# Patient Record
Sex: FEMALE | Race: WHITE | Employment: OTHER | URBAN - METROPOLITAN AREA
[De-identification: names, ages, dates, MRNs, and addresses within clinical notes are randomized per-mention and may not be internally consistent; named-entity substitution may affect disease eponyms.]

---

## 2017-02-10 RX ORDER — ATENOLOL 25 MG/1
25 TABLET ORAL DAILY
COMMUNITY
End: 2017-07-05 | Stop reason: SDUPTHER

## 2017-02-10 RX ORDER — OMEPRAZOLE 40 MG/1
40 CAPSULE, DELAYED RELEASE ORAL DAILY
COMMUNITY
End: 2017-02-14

## 2017-02-10 RX ORDER — SIMVASTATIN 40 MG
40 TABLET ORAL NIGHTLY
Status: ON HOLD | COMMUNITY
End: 2018-07-27 | Stop reason: HOSPADM

## 2017-02-10 RX ORDER — GABAPENTIN 600 MG/1
1200 TABLET ORAL 4 TIMES DAILY
COMMUNITY
End: 2018-03-05 | Stop reason: SDUPTHER

## 2017-02-10 RX ORDER — LEVOTHYROXINE SODIUM 0.1 MG/1
100 TABLET ORAL DAILY
COMMUNITY
End: 2017-07-05 | Stop reason: SDUPTHER

## 2017-02-10 RX ORDER — OXYCODONE HYDROCHLORIDE AND ACETAMINOPHEN 5; 325 MG/1; MG/1
1 TABLET ORAL EVERY 6 HOURS PRN
COMMUNITY
End: 2017-08-03

## 2017-03-07 PROBLEM — M16.12 PRIMARY OSTEOARTHRITIS OF LEFT HIP: Chronic | Status: ACTIVE | Noted: 2017-03-07

## 2017-03-08 PROBLEM — E03.9 HYPOTHYROID: Status: ACTIVE | Noted: 2017-03-08

## 2017-03-08 PROBLEM — R53.83 FATIGUE: Status: ACTIVE | Noted: 2017-03-08

## 2017-03-08 PROBLEM — I10 ESSENTIAL HYPERTENSION: Status: ACTIVE | Noted: 2017-03-08

## 2017-03-13 ENCOUNTER — TELEPHONE (OUTPATIENT)
Dept: ORTHOPEDIC SURGERY | Age: 72
End: 2017-03-13

## 2017-04-07 ENCOUNTER — OFFICE VISIT (OUTPATIENT)
Dept: FAMILY MEDICINE CLINIC | Age: 72
End: 2017-04-07
Payer: COMMERCIAL

## 2017-04-07 VITALS
SYSTOLIC BLOOD PRESSURE: 140 MMHG | HEART RATE: 65 BPM | BODY MASS INDEX: 25.78 KG/M2 | DIASTOLIC BLOOD PRESSURE: 82 MMHG | HEIGHT: 64 IN | WEIGHT: 151 LBS

## 2017-04-07 DIAGNOSIS — I10 ESSENTIAL HYPERTENSION: Primary | ICD-10-CM

## 2017-04-07 DIAGNOSIS — M15.9 PRIMARY OSTEOARTHRITIS INVOLVING MULTIPLE JOINTS: ICD-10-CM

## 2017-04-07 DIAGNOSIS — E03.9 ACQUIRED HYPOTHYROIDISM: ICD-10-CM

## 2017-04-07 DIAGNOSIS — E78.2 MIXED HYPERLIPIDEMIA: ICD-10-CM

## 2017-04-07 DIAGNOSIS — I25.10 CORONARY ARTERY DISEASE INVOLVING NATIVE CORONARY ARTERY OF NATIVE HEART WITHOUT ANGINA PECTORIS: ICD-10-CM

## 2017-04-07 DIAGNOSIS — M21.371 RIGHT FOOT DROP: ICD-10-CM

## 2017-04-07 PROCEDURE — 99204 OFFICE O/P NEW MOD 45 MIN: CPT | Performed by: FAMILY MEDICINE

## 2017-04-07 RX ORDER — HYDROCODONE BITARTRATE AND ACETAMINOPHEN 5; 325 MG/1; MG/1
1 TABLET ORAL EVERY 6 HOURS PRN
COMMUNITY
End: 2018-03-28 | Stop reason: SDUPTHER

## 2017-04-07 RX ORDER — ASPIRIN 325 MG
325 TABLET ORAL 2 TIMES DAILY
COMMUNITY
End: 2017-08-03

## 2017-04-07 ASSESSMENT — ENCOUNTER SYMPTOMS
SHORTNESS OF BREATH: 0
COUGH: 0
CONSTIPATION: 0
ABDOMINAL PAIN: 0
DIARRHEA: 0
EYES NEGATIVE: 1

## 2017-04-14 LAB
CHOLESTEROL, TOTAL: NORMAL MG/DL
CHOLESTEROL/HDL RATIO: NORMAL
HDLC SERPL-MCNC: NORMAL MG/DL (ref 35–70)
LDL CHOLESTEROL CALCULATED: NORMAL MG/DL (ref 0–160)
T4 FREE: NORMAL
TRIGL SERPL-MCNC: NORMAL MG/DL
TSH SERPL DL<=0.05 MIU/L-ACNC: NORMAL UIU/ML
VLDLC SERPL CALC-MCNC: NORMAL MG/DL

## 2017-04-17 DIAGNOSIS — E03.9 ACQUIRED HYPOTHYROIDISM: ICD-10-CM

## 2017-04-17 DIAGNOSIS — E78.2 MIXED HYPERLIPIDEMIA: ICD-10-CM

## 2017-07-05 RX ORDER — LEVOTHYROXINE SODIUM 0.1 MG/1
100 TABLET ORAL DAILY
Qty: 30 TABLET | Refills: 3 | Status: SHIPPED | OUTPATIENT
Start: 2017-07-05 | End: 2017-10-08 | Stop reason: SDUPTHER

## 2017-07-05 RX ORDER — ATENOLOL 25 MG/1
25 TABLET ORAL DAILY
Qty: 30 TABLET | Refills: 3 | Status: SHIPPED | OUTPATIENT
Start: 2017-07-05 | End: 2017-10-08 | Stop reason: SDUPTHER

## 2017-08-03 ENCOUNTER — HOSPITAL ENCOUNTER (INPATIENT)
Age: 72
LOS: 2 days | Discharge: HOME | DRG: 372 | End: 2017-08-05
Attending: EMERGENCY MEDICINE | Admitting: FAMILY MEDICINE
Payer: MEDICARE

## 2017-08-03 ENCOUNTER — TELEPHONE (OUTPATIENT)
Dept: FAMILY MEDICINE CLINIC | Age: 72
End: 2017-08-03

## 2017-08-03 DIAGNOSIS — K92.2 GASTROINTESTINAL HEMORRHAGE, UNSPECIFIED GASTROINTESTINAL HEMORRHAGE TYPE: Primary | ICD-10-CM

## 2017-08-03 PROBLEM — K92.0 COFFEE GROUND EMESIS: Status: ACTIVE | Noted: 2017-08-03

## 2017-08-03 PROBLEM — K62.5 BLEEDING PER RECTUM: Status: ACTIVE | Noted: 2017-08-03

## 2017-08-03 LAB
ABSOLUTE EOS #: 0.2 K/UL (ref 0–0.4)
ABSOLUTE LYMPH #: 2.4 K/UL (ref 1–4.8)
ABSOLUTE MONO #: 0.6 K/UL (ref 0.2–0.8)
ANION GAP SERPL CALCULATED.3IONS-SCNC: 15 MMOL/L (ref 9–17)
BASOPHILS # BLD: 1 %
BASOPHILS ABSOLUTE: 0.1 K/UL (ref 0–0.2)
BUN BLDV-MCNC: 16 MG/DL (ref 8–23)
BUN/CREAT BLD: 22 (ref 9–20)
CALCIUM SERPL-MCNC: 9.6 MG/DL (ref 8.6–10.4)
CHLORIDE BLD-SCNC: 99 MMOL/L (ref 98–107)
CO2: 24 MMOL/L (ref 20–31)
CREAT SERPL-MCNC: 0.74 MG/DL (ref 0.5–0.9)
DATE, STOOL #1: ABNORMAL
DATE, STOOL #2: ABNORMAL
DATE, STOOL #3: ABNORMAL
DIFFERENTIAL TYPE: ABNORMAL
EOSINOPHILS RELATIVE PERCENT: 2 %
GFR AFRICAN AMERICAN: >60 ML/MIN
GFR NON-AFRICAN AMERICAN: >60 ML/MIN
GFR SERPL CREATININE-BSD FRML MDRD: ABNORMAL ML/MIN/{1.73_M2}
GFR SERPL CREATININE-BSD FRML MDRD: ABNORMAL ML/MIN/{1.73_M2}
GLUCOSE BLD-MCNC: 118 MG/DL (ref 70–99)
HCT VFR BLD CALC: 37.6 % (ref 36–46)
HEMOCCULT SP1 STL QL: POSITIVE
HEMOCCULT SP2 STL QL: POSITIVE
HEMOCCULT SP3 STL QL: ABNORMAL
HEMOGLOBIN: 12.8 G/DL (ref 12–16)
LYMPHOCYTES # BLD: 18 %
MCH RBC QN AUTO: 31.5 PG (ref 26–34)
MCHC RBC AUTO-ENTMCNC: 34.1 G/DL (ref 31–37)
MCV RBC AUTO: 92.6 FL (ref 80–100)
MONOCYTES # BLD: 5 %
PDW BLD-RTO: 13.7 % (ref 11.5–14.5)
PLATELET # BLD: 308 K/UL (ref 130–400)
PLATELET ESTIMATE: ABNORMAL
PMV BLD AUTO: 7.5 FL (ref 6–12)
POTASSIUM SERPL-SCNC: 4 MMOL/L (ref 3.7–5.3)
RBC # BLD: 4.06 M/UL (ref 4–5.2)
RBC # BLD: ABNORMAL 10*6/UL
SEG NEUTROPHILS: 74 %
SEGMENTED NEUTROPHILS ABSOLUTE COUNT: 9.8 K/UL (ref 1.8–7.7)
SODIUM BLD-SCNC: 138 MMOL/L (ref 135–144)
TIME, STOOL #1: 1330
TIME, STOOL #2: ABNORMAL
TIME, STOOL #3: ABNORMAL
WBC # BLD: 13.2 K/UL (ref 3.5–11)
WBC # BLD: ABNORMAL 10*3/UL

## 2017-08-03 PROCEDURE — 80048 BASIC METABOLIC PNL TOTAL CA: CPT

## 2017-08-03 PROCEDURE — 2580000003 HC RX 258: Performed by: NURSE PRACTITIONER

## 2017-08-03 PROCEDURE — S0028 INJECTION, FAMOTIDINE, 20 MG: HCPCS | Performed by: NURSE PRACTITIONER

## 2017-08-03 PROCEDURE — G0328 FECAL BLOOD SCRN IMMUNOASSAY: HCPCS

## 2017-08-03 PROCEDURE — 2500000003 HC RX 250 WO HCPCS: Performed by: NURSE PRACTITIONER

## 2017-08-03 PROCEDURE — 86850 RBC ANTIBODY SCREEN: CPT

## 2017-08-03 PROCEDURE — 86870 RBC ANTIBODY IDENTIFICATION: CPT

## 2017-08-03 PROCEDURE — 99222 1ST HOSP IP/OBS MODERATE 55: CPT | Performed by: INTERNAL MEDICINE

## 2017-08-03 PROCEDURE — 86900 BLOOD TYPING SEROLOGIC ABO: CPT

## 2017-08-03 PROCEDURE — 86901 BLOOD TYPING SEROLOGIC RH(D): CPT

## 2017-08-03 PROCEDURE — 99222 1ST HOSP IP/OBS MODERATE 55: CPT | Performed by: FAMILY MEDICINE

## 2017-08-03 PROCEDURE — 6370000000 HC RX 637 (ALT 250 FOR IP): Performed by: INTERNAL MEDICINE

## 2017-08-03 PROCEDURE — 85018 HEMOGLOBIN: CPT

## 2017-08-03 PROCEDURE — 36415 COLL VENOUS BLD VENIPUNCTURE: CPT

## 2017-08-03 PROCEDURE — 2580000003 HC RX 258: Performed by: FAMILY MEDICINE

## 2017-08-03 PROCEDURE — 6370000000 HC RX 637 (ALT 250 FOR IP): Performed by: FAMILY MEDICINE

## 2017-08-03 PROCEDURE — 85014 HEMATOCRIT: CPT

## 2017-08-03 PROCEDURE — 1200000000 HC SEMI PRIVATE

## 2017-08-03 PROCEDURE — 6360000002 HC RX W HCPCS: Performed by: NURSE PRACTITIONER

## 2017-08-03 PROCEDURE — 99284 EMERGENCY DEPT VISIT MOD MDM: CPT

## 2017-08-03 PROCEDURE — C9113 INJ PANTOPRAZOLE SODIUM, VIA: HCPCS | Performed by: NURSE PRACTITIONER

## 2017-08-03 PROCEDURE — 85025 COMPLETE CBC W/AUTO DIFF WBC: CPT

## 2017-08-03 RX ORDER — TRIAMCINOLONE ACETONIDE 0.25 MG/G
1 CREAM TOPICAL 2 TIMES DAILY
COMMUNITY

## 2017-08-03 RX ORDER — POTASSIUM CHLORIDE 20 MEQ/1
40 TABLET, EXTENDED RELEASE ORAL PRN
Status: DISCONTINUED | OUTPATIENT
Start: 2017-08-03 | End: 2017-08-05 | Stop reason: HOSPADM

## 2017-08-03 RX ORDER — POLYETHYLENE GLYCOL 3350 17 G/17G
255 POWDER, FOR SOLUTION ORAL ONCE
Status: COMPLETED | OUTPATIENT
Start: 2017-08-03 | End: 2017-08-03

## 2017-08-03 RX ORDER — LEVOTHYROXINE SODIUM 0.1 MG/1
100 TABLET ORAL DAILY
Status: DISCONTINUED | OUTPATIENT
Start: 2017-08-03 | End: 2017-08-03

## 2017-08-03 RX ORDER — MAGNESIUM SULFATE 1 G/100ML
1 INJECTION INTRAVENOUS PRN
Status: DISCONTINUED | OUTPATIENT
Start: 2017-08-03 | End: 2017-08-05 | Stop reason: HOSPADM

## 2017-08-03 RX ORDER — SODIUM CHLORIDE 9 MG/ML
INJECTION, SOLUTION INTRAVENOUS CONTINUOUS
Status: DISCONTINUED | OUTPATIENT
Start: 2017-08-03 | End: 2017-08-03

## 2017-08-03 RX ORDER — ONDANSETRON 2 MG/ML
4 INJECTION INTRAMUSCULAR; INTRAVENOUS EVERY 6 HOURS PRN
Status: DISCONTINUED | OUTPATIENT
Start: 2017-08-03 | End: 2017-08-05 | Stop reason: HOSPADM

## 2017-08-03 RX ORDER — DOCUSATE SODIUM 100 MG/1
100 CAPSULE, LIQUID FILLED ORAL 2 TIMES DAILY PRN
Status: DISCONTINUED | OUTPATIENT
Start: 2017-08-03 | End: 2017-08-05 | Stop reason: HOSPADM

## 2017-08-03 RX ORDER — ATENOLOL 25 MG/1
25 TABLET ORAL DAILY
Status: DISCONTINUED | OUTPATIENT
Start: 2017-08-03 | End: 2017-08-04

## 2017-08-03 RX ORDER — SODIUM CHLORIDE 0.9 % (FLUSH) 0.9 %
10 SYRINGE (ML) INJECTION EVERY 12 HOURS SCHEDULED
Status: DISCONTINUED | OUTPATIENT
Start: 2017-08-03 | End: 2017-08-05 | Stop reason: HOSPADM

## 2017-08-03 RX ORDER — POTASSIUM CHLORIDE 7.45 MG/ML
10 INJECTION INTRAVENOUS PRN
Status: DISCONTINUED | OUTPATIENT
Start: 2017-08-03 | End: 2017-08-05 | Stop reason: HOSPADM

## 2017-08-03 RX ORDER — SIMVASTATIN 40 MG
40 TABLET ORAL NIGHTLY
Status: DISCONTINUED | OUTPATIENT
Start: 2017-08-03 | End: 2017-08-05 | Stop reason: HOSPADM

## 2017-08-03 RX ORDER — 0.9 % SODIUM CHLORIDE 0.9 %
500 INTRAVENOUS SOLUTION INTRAVENOUS ONCE
Status: COMPLETED | OUTPATIENT
Start: 2017-08-03 | End: 2017-08-03

## 2017-08-03 RX ORDER — LEVOTHYROXINE SODIUM 0.1 MG/1
100 TABLET ORAL NIGHTLY
Status: DISCONTINUED | OUTPATIENT
Start: 2017-08-03 | End: 2017-08-05 | Stop reason: HOSPADM

## 2017-08-03 RX ORDER — 0.9 % SODIUM CHLORIDE 0.9 %
10 VIAL (ML) INJECTION 2 TIMES DAILY
Status: DISCONTINUED | OUTPATIENT
Start: 2017-08-03 | End: 2017-08-03

## 2017-08-03 RX ORDER — GABAPENTIN 400 MG/1
800 CAPSULE ORAL 3 TIMES DAILY
Status: DISCONTINUED | OUTPATIENT
Start: 2017-08-03 | End: 2017-08-05 | Stop reason: HOSPADM

## 2017-08-03 RX ORDER — POTASSIUM CHLORIDE 20MEQ/15ML
40 LIQUID (ML) ORAL PRN
Status: DISCONTINUED | OUTPATIENT
Start: 2017-08-03 | End: 2017-08-05 | Stop reason: HOSPADM

## 2017-08-03 RX ORDER — SODIUM CHLORIDE 9 MG/ML
INJECTION, SOLUTION INTRAVENOUS CONTINUOUS
Status: DISCONTINUED | OUTPATIENT
Start: 2017-08-03 | End: 2017-08-05

## 2017-08-03 RX ORDER — PANTOPRAZOLE SODIUM 40 MG/10ML
40 INJECTION, POWDER, LYOPHILIZED, FOR SOLUTION INTRAVENOUS 2 TIMES DAILY
Status: DISCONTINUED | OUTPATIENT
Start: 2017-08-03 | End: 2017-08-03

## 2017-08-03 RX ORDER — SODIUM CHLORIDE 0.9 % (FLUSH) 0.9 %
10 SYRINGE (ML) INJECTION PRN
Status: DISCONTINUED | OUTPATIENT
Start: 2017-08-03 | End: 2017-08-05 | Stop reason: HOSPADM

## 2017-08-03 RX ORDER — ACETAMINOPHEN 325 MG/1
650 TABLET ORAL EVERY 4 HOURS PRN
Status: DISCONTINUED | OUTPATIENT
Start: 2017-08-03 | End: 2017-08-05 | Stop reason: HOSPADM

## 2017-08-03 RX ORDER — LEVOTHYROXINE SODIUM 0.1 MG/1
100 TABLET ORAL NIGHTLY
Status: DISCONTINUED | OUTPATIENT
Start: 2017-08-04 | End: 2017-08-03

## 2017-08-03 RX ORDER — MORPHINE SULFATE 2 MG/ML
1 INJECTION, SOLUTION INTRAMUSCULAR; INTRAVENOUS
Status: DISCONTINUED | OUTPATIENT
Start: 2017-08-03 | End: 2017-08-05 | Stop reason: HOSPADM

## 2017-08-03 RX ORDER — GABAPENTIN 600 MG/1
600 TABLET ORAL 4 TIMES DAILY
Status: CANCELLED | OUTPATIENT
Start: 2017-08-03

## 2017-08-03 RX ADMIN — LEVOTHYROXINE SODIUM 100 MCG: 100 TABLET ORAL at 21:39

## 2017-08-03 RX ADMIN — FAMOTIDINE 20 MG: 10 INJECTION, SOLUTION INTRAVENOUS at 14:55

## 2017-08-03 RX ADMIN — SODIUM CHLORIDE: 9 INJECTION, SOLUTION INTRAVENOUS at 18:44

## 2017-08-03 RX ADMIN — GABAPENTIN 800 MG: 400 CAPSULE ORAL at 21:38

## 2017-08-03 RX ADMIN — BISACODYL 20 MG: 5 TABLET, COATED ORAL at 18:34

## 2017-08-03 RX ADMIN — SODIUM CHLORIDE 500 ML: 9 INJECTION, SOLUTION INTRAVENOUS at 14:55

## 2017-08-03 RX ADMIN — POLYETHYLENE GLYCOL 3350 255 G: 17 POWDER, FOR SOLUTION ORAL at 18:34

## 2017-08-03 RX ADMIN — SODIUM CHLORIDE 80 MG: 9 INJECTION, SOLUTION INTRAVENOUS at 14:55

## 2017-08-03 RX ADMIN — SIMVASTATIN 40 MG: 40 TABLET, FILM COATED ORAL at 21:39

## 2017-08-03 RX ADMIN — SODIUM CHLORIDE 8 MG/HR: 9 INJECTION, SOLUTION INTRAVENOUS at 15:32

## 2017-08-03 ASSESSMENT — ENCOUNTER SYMPTOMS
VOMITING: 0
CONSTIPATION: 0
SINUS PRESSURE: 0
VOICE CHANGE: 0
COUGH: 0
SORE THROAT: 0
NAUSEA: 0
RHINORRHEA: 0
DIARRHEA: 0
COLOR CHANGE: 0
ABDOMINAL PAIN: 0
SHORTNESS OF BREATH: 0
BLOOD IN STOOL: 1
WHEEZING: 0

## 2017-08-03 ASSESSMENT — PAIN DESCRIPTION - LOCATION: LOCATION: ABDOMEN

## 2017-08-03 ASSESSMENT — PAIN DESCRIPTION - DESCRIPTORS: DESCRIPTORS: DULL

## 2017-08-03 ASSESSMENT — PAIN DESCRIPTION - PAIN TYPE: TYPE: ACUTE PAIN

## 2017-08-03 ASSESSMENT — PAIN SCALES - GENERAL: PAINLEVEL_OUTOF10: 2

## 2017-08-03 ASSESSMENT — PAIN DESCRIPTION - FREQUENCY: FREQUENCY: CONTINUOUS

## 2017-08-04 ENCOUNTER — ANESTHESIA (OUTPATIENT)
Dept: OPERATING ROOM | Age: 72
DRG: 372 | End: 2017-08-04
Payer: MEDICARE

## 2017-08-04 ENCOUNTER — ANESTHESIA EVENT (OUTPATIENT)
Dept: OPERATING ROOM | Age: 72
DRG: 372 | End: 2017-08-04
Payer: MEDICARE

## 2017-08-04 VITALS — OXYGEN SATURATION: 95 % | DIASTOLIC BLOOD PRESSURE: 69 MMHG | SYSTOLIC BLOOD PRESSURE: 150 MMHG

## 2017-08-04 PROBLEM — I47.29 NSVT (NONSUSTAINED VENTRICULAR TACHYCARDIA) (HCC): Status: ACTIVE | Noted: 2017-08-04

## 2017-08-04 LAB
ANION GAP SERPL CALCULATED.3IONS-SCNC: 14 MMOL/L (ref 9–17)
ANION GAP SERPL CALCULATED.3IONS-SCNC: 15 MMOL/L (ref 9–17)
BUN BLDV-MCNC: 13 MG/DL (ref 8–23)
BUN BLDV-MCNC: 13 MG/DL (ref 8–23)
BUN/CREAT BLD: 16 (ref 9–20)
BUN/CREAT BLD: 18 (ref 9–20)
C DIFFICILE TOXINS, PCR: NORMAL
CALCIUM SERPL-MCNC: 9.1 MG/DL (ref 8.6–10.4)
CALCIUM SERPL-MCNC: 9.2 MG/DL (ref 8.6–10.4)
CHLORIDE BLD-SCNC: 103 MMOL/L (ref 98–107)
CHLORIDE BLD-SCNC: 103 MMOL/L (ref 98–107)
CO2: 23 MMOL/L (ref 20–31)
CO2: 25 MMOL/L (ref 20–31)
CREAT SERPL-MCNC: 0.74 MG/DL (ref 0.5–0.9)
CREAT SERPL-MCNC: 0.81 MG/DL (ref 0.5–0.9)
GFR AFRICAN AMERICAN: >60 ML/MIN
GFR AFRICAN AMERICAN: >60 ML/MIN
GFR NON-AFRICAN AMERICAN: >60 ML/MIN
GFR NON-AFRICAN AMERICAN: >60 ML/MIN
GFR SERPL CREATININE-BSD FRML MDRD: ABNORMAL ML/MIN/{1.73_M2}
GLUCOSE BLD-MCNC: 100 MG/DL (ref 70–99)
GLUCOSE BLD-MCNC: 119 MG/DL (ref 70–99)
HCT VFR BLD CALC: 36.2 % (ref 36–46)
HCT VFR BLD CALC: 36.5 % (ref 36–46)
HCT VFR BLD CALC: 38.3 % (ref 36–46)
HEMOGLOBIN: 12.2 G/DL (ref 12–16)
HEMOGLOBIN: 12.8 G/DL (ref 12–16)
HEMOGLOBIN: 12.8 G/DL (ref 12–16)
MAGNESIUM: 1.9 MG/DL (ref 1.6–2.6)
MCH RBC QN AUTO: 31.6 PG (ref 26–34)
MCHC RBC AUTO-ENTMCNC: 33.7 G/DL (ref 31–37)
MCV RBC AUTO: 93.9 FL (ref 80–100)
PDW BLD-RTO: 14.1 % (ref 11.5–14.5)
PLATELET # BLD: 327 K/UL (ref 130–400)
PMV BLD AUTO: 8 FL (ref 6–12)
POTASSIUM SERPL-SCNC: 3.8 MMOL/L (ref 3.7–5.3)
POTASSIUM SERPL-SCNC: 3.9 MMOL/L (ref 3.7–5.3)
RBC # BLD: 3.85 M/UL (ref 4–5.2)
SODIUM BLD-SCNC: 141 MMOL/L (ref 135–144)
SODIUM BLD-SCNC: 142 MMOL/L (ref 135–144)
SPECIMEN DESCRIPTION: NORMAL
WBC # BLD: 14.5 K/UL (ref 3.5–11)

## 2017-08-04 PROCEDURE — 3609010300 HC COLONOSCOPY W/BIOPSY SINGLE/MULTIPLE: Performed by: INTERNAL MEDICINE

## 2017-08-04 PROCEDURE — 6370000000 HC RX 637 (ALT 250 FOR IP): Performed by: FAMILY MEDICINE

## 2017-08-04 PROCEDURE — 0DB78ZX EXCISION OF STOMACH, PYLORUS, VIA NATURAL OR ARTIFICIAL OPENING ENDOSCOPIC, DIAGNOSTIC: ICD-10-PCS | Performed by: INTERNAL MEDICINE

## 2017-08-04 PROCEDURE — 3700000000 HC ANESTHESIA ATTENDED CARE: Performed by: INTERNAL MEDICINE

## 2017-08-04 PROCEDURE — 2500000003 HC RX 250 WO HCPCS: Performed by: NURSE ANESTHETIST, CERTIFIED REGISTERED

## 2017-08-04 PROCEDURE — 2060000000 HC ICU INTERMEDIATE R&B

## 2017-08-04 PROCEDURE — 85018 HEMOGLOBIN: CPT

## 2017-08-04 PROCEDURE — C9113 INJ PANTOPRAZOLE SODIUM, VIA: HCPCS | Performed by: INTERNAL MEDICINE

## 2017-08-04 PROCEDURE — 1200000000 HC SEMI PRIVATE

## 2017-08-04 PROCEDURE — 99232 SBSQ HOSP IP/OBS MODERATE 35: CPT | Performed by: INTERNAL MEDICINE

## 2017-08-04 PROCEDURE — 0DBM8ZX EXCISION OF DESCENDING COLON, VIA NATURAL OR ARTIFICIAL OPENING ENDOSCOPIC, DIAGNOSTIC: ICD-10-PCS | Performed by: INTERNAL MEDICINE

## 2017-08-04 PROCEDURE — 80048 BASIC METABOLIC PNL TOTAL CA: CPT

## 2017-08-04 PROCEDURE — 2580000003 HC RX 258: Performed by: FAMILY MEDICINE

## 2017-08-04 PROCEDURE — 7100000010 HC PHASE II RECOVERY - FIRST 15 MIN: Performed by: INTERNAL MEDICINE

## 2017-08-04 PROCEDURE — 88305 TISSUE EXAM BY PATHOLOGIST: CPT

## 2017-08-04 PROCEDURE — 83735 ASSAY OF MAGNESIUM: CPT

## 2017-08-04 PROCEDURE — 2580000003 HC RX 258: Performed by: INTERNAL MEDICINE

## 2017-08-04 PROCEDURE — G8979 MOBILITY GOAL STATUS: HCPCS

## 2017-08-04 PROCEDURE — 3609012400 HC EGD TRANSORAL BIOPSY SINGLE/MULTIPLE: Performed by: INTERNAL MEDICINE

## 2017-08-04 PROCEDURE — 85027 COMPLETE CBC AUTOMATED: CPT

## 2017-08-04 PROCEDURE — 97530 THERAPEUTIC ACTIVITIES: CPT

## 2017-08-04 PROCEDURE — 36415 COLL VENOUS BLD VENIPUNCTURE: CPT

## 2017-08-04 PROCEDURE — 6360000002 HC RX W HCPCS: Performed by: INTERNAL MEDICINE

## 2017-08-04 PROCEDURE — 85014 HEMATOCRIT: CPT

## 2017-08-04 PROCEDURE — 7100000011 HC PHASE II RECOVERY - ADDTL 15 MIN: Performed by: INTERNAL MEDICINE

## 2017-08-04 PROCEDURE — 6370000000 HC RX 637 (ALT 250 FOR IP): Performed by: NURSE PRACTITIONER

## 2017-08-04 PROCEDURE — G8978 MOBILITY CURRENT STATUS: HCPCS

## 2017-08-04 PROCEDURE — 6360000002 HC RX W HCPCS: Performed by: NURSE ANESTHETIST, CERTIFIED REGISTERED

## 2017-08-04 PROCEDURE — 6370000000 HC RX 637 (ALT 250 FOR IP): Performed by: INTERNAL MEDICINE

## 2017-08-04 PROCEDURE — 0DBN8ZX EXCISION OF SIGMOID COLON, VIA NATURAL OR ARTIFICIAL OPENING ENDOSCOPIC, DIAGNOSTIC: ICD-10-PCS | Performed by: INTERNAL MEDICINE

## 2017-08-04 PROCEDURE — 2580000003 HC RX 258: Performed by: NURSE ANESTHETIST, CERTIFIED REGISTERED

## 2017-08-04 PROCEDURE — 45380 COLONOSCOPY AND BIOPSY: CPT | Performed by: INTERNAL MEDICINE

## 2017-08-04 PROCEDURE — 3700000001 HC ADD 15 MINUTES (ANESTHESIA): Performed by: INTERNAL MEDICINE

## 2017-08-04 PROCEDURE — 97161 PT EVAL LOW COMPLEX 20 MIN: CPT

## 2017-08-04 PROCEDURE — G8980 MOBILITY D/C STATUS: HCPCS

## 2017-08-04 PROCEDURE — 0DB98ZX EXCISION OF DUODENUM, VIA NATURAL OR ARTIFICIAL OPENING ENDOSCOPIC, DIAGNOSTIC: ICD-10-PCS | Performed by: INTERNAL MEDICINE

## 2017-08-04 PROCEDURE — 43239 EGD BIOPSY SINGLE/MULTIPLE: CPT | Performed by: INTERNAL MEDICINE

## 2017-08-04 PROCEDURE — 87493 C DIFF AMPLIFIED PROBE: CPT

## 2017-08-04 RX ORDER — LIDOCAINE HYDROCHLORIDE 20 MG/ML
INJECTION, SOLUTION EPIDURAL; INFILTRATION; INTRACAUDAL; PERINEURAL PRN
Status: DISCONTINUED | OUTPATIENT
Start: 2017-08-04 | End: 2017-08-04 | Stop reason: SDUPTHER

## 2017-08-04 RX ORDER — ATENOLOL 25 MG/1
25 TABLET ORAL DAILY
Status: DISCONTINUED | OUTPATIENT
Start: 2017-08-04 | End: 2017-08-05 | Stop reason: HOSPADM

## 2017-08-04 RX ORDER — LABETALOL HYDROCHLORIDE 5 MG/ML
INJECTION, SOLUTION INTRAVENOUS PRN
Status: DISCONTINUED | OUTPATIENT
Start: 2017-08-04 | End: 2017-08-04 | Stop reason: SDUPTHER

## 2017-08-04 RX ORDER — SODIUM CHLORIDE, SODIUM LACTATE, POTASSIUM CHLORIDE, CALCIUM CHLORIDE 600; 310; 30; 20 MG/100ML; MG/100ML; MG/100ML; MG/100ML
INJECTION, SOLUTION INTRAVENOUS CONTINUOUS PRN
Status: DISCONTINUED | OUTPATIENT
Start: 2017-08-04 | End: 2017-08-04 | Stop reason: SDUPTHER

## 2017-08-04 RX ORDER — PROPOFOL 10 MG/ML
INJECTION, EMULSION INTRAVENOUS PRN
Status: DISCONTINUED | OUTPATIENT
Start: 2017-08-04 | End: 2017-08-04 | Stop reason: SDUPTHER

## 2017-08-04 RX ORDER — METRONIDAZOLE 500 MG/1
500 TABLET ORAL EVERY 8 HOURS SCHEDULED
Status: DISCONTINUED | OUTPATIENT
Start: 2017-08-05 | End: 2017-08-05 | Stop reason: HOSPADM

## 2017-08-04 RX ORDER — FENTANYL CITRATE 50 UG/ML
INJECTION, SOLUTION INTRAMUSCULAR; INTRAVENOUS PRN
Status: DISCONTINUED | OUTPATIENT
Start: 2017-08-04 | End: 2017-08-04 | Stop reason: SDUPTHER

## 2017-08-04 RX ADMIN — PROPOFOL 20 MG: 10 INJECTION, EMULSION INTRAVENOUS at 15:00

## 2017-08-04 RX ADMIN — LABETALOL HYDROCHLORIDE 10 MG: 5 INJECTION, SOLUTION INTRAVENOUS at 14:56

## 2017-08-04 RX ADMIN — LIDOCAINE HYDROCHLORIDE 20 MG: 20 INJECTION, SOLUTION EPIDURAL; INFILTRATION; INTRACAUDAL; PERINEURAL at 14:41

## 2017-08-04 RX ADMIN — SODIUM CHLORIDE, POTASSIUM CHLORIDE, SODIUM LACTATE AND CALCIUM CHLORIDE: 600; 310; 30; 20 INJECTION, SOLUTION INTRAVENOUS at 15:05

## 2017-08-04 RX ADMIN — PROPOFOL 20 MG: 10 INJECTION, EMULSION INTRAVENOUS at 14:49

## 2017-08-04 RX ADMIN — SIMVASTATIN 40 MG: 40 TABLET, FILM COATED ORAL at 20:55

## 2017-08-04 RX ADMIN — LIDOCAINE HYDROCHLORIDE 20 MG: 20 INJECTION, SOLUTION EPIDURAL; INFILTRATION; INTRACAUDAL; PERINEURAL at 14:42

## 2017-08-04 RX ADMIN — METOPROLOL TARTRATE 25 MG: 25 TABLET ORAL at 02:18

## 2017-08-04 RX ADMIN — GABAPENTIN 800 MG: 400 CAPSULE ORAL at 20:54

## 2017-08-04 RX ADMIN — METRONIDAZOLE 500 MG: 500 TABLET, FILM COATED ORAL at 23:55

## 2017-08-04 RX ADMIN — PROPOFOL 20 MG: 10 INJECTION, EMULSION INTRAVENOUS at 14:45

## 2017-08-04 RX ADMIN — SODIUM CHLORIDE 8 MG/HR: 9 INJECTION, SOLUTION INTRAVENOUS at 18:18

## 2017-08-04 RX ADMIN — ATENOLOL 25 MG: 25 TABLET ORAL at 20:54

## 2017-08-04 RX ADMIN — PROPOFOL 20 MG: 10 INJECTION, EMULSION INTRAVENOUS at 15:04

## 2017-08-04 RX ADMIN — SODIUM CHLORIDE, POTASSIUM CHLORIDE, SODIUM LACTATE AND CALCIUM CHLORIDE: 600; 310; 30; 20 INJECTION, SOLUTION INTRAVENOUS at 14:27

## 2017-08-04 RX ADMIN — SODIUM CHLORIDE: 9 INJECTION, SOLUTION INTRAVENOUS at 07:09

## 2017-08-04 RX ADMIN — PROPOFOL 20 MG: 10 INJECTION, EMULSION INTRAVENOUS at 14:36

## 2017-08-04 RX ADMIN — PROPOFOL 20 MG: 10 INJECTION, EMULSION INTRAVENOUS at 14:34

## 2017-08-04 RX ADMIN — LIDOCAINE HYDROCHLORIDE 20 MG: 20 INJECTION, SOLUTION EPIDURAL; INFILTRATION; INTRACAUDAL; PERINEURAL at 14:36

## 2017-08-04 RX ADMIN — PROPOFOL 20 MG: 10 INJECTION, EMULSION INTRAVENOUS at 14:38

## 2017-08-04 RX ADMIN — FENTANYL CITRATE 25 MCG: 50 INJECTION, SOLUTION INTRAMUSCULAR; INTRAVENOUS at 14:52

## 2017-08-04 RX ADMIN — METOPROLOL TARTRATE 25 MG: 25 TABLET ORAL at 09:11

## 2017-08-04 RX ADMIN — FENTANYL CITRATE 25 MCG: 50 INJECTION, SOLUTION INTRAMUSCULAR; INTRAVENOUS at 14:36

## 2017-08-04 RX ADMIN — PROPOFOL 20 MG: 10 INJECTION, EMULSION INTRAVENOUS at 14:42

## 2017-08-04 RX ADMIN — PROPOFOL 20 MG: 10 INJECTION, EMULSION INTRAVENOUS at 14:52

## 2017-08-04 RX ADMIN — PROPOFOL 20 MG: 10 INJECTION, EMULSION INTRAVENOUS at 14:57

## 2017-08-04 RX ADMIN — LIDOCAINE HYDROCHLORIDE 20 MG: 20 INJECTION, SOLUTION EPIDURAL; INFILTRATION; INTRACAUDAL; PERINEURAL at 14:40

## 2017-08-04 RX ADMIN — LIDOCAINE HYDROCHLORIDE 20 MG: 20 INJECTION, SOLUTION EPIDURAL; INFILTRATION; INTRACAUDAL; PERINEURAL at 14:38

## 2017-08-04 RX ADMIN — PROPOFOL 20 MG: 10 INJECTION, EMULSION INTRAVENOUS at 14:40

## 2017-08-04 RX ADMIN — FENTANYL CITRATE 25 MCG: 50 INJECTION, SOLUTION INTRAMUSCULAR; INTRAVENOUS at 14:50

## 2017-08-04 RX ADMIN — GABAPENTIN 800 MG: 400 CAPSULE ORAL at 09:11

## 2017-08-04 RX ADMIN — FENTANYL CITRATE 25 MCG: 50 INJECTION, SOLUTION INTRAMUSCULAR; INTRAVENOUS at 14:45

## 2017-08-04 ASSESSMENT — PAIN SCALES - GENERAL: PAINLEVEL_OUTOF10: 0

## 2017-08-05 VITALS
WEIGHT: 155.1 LBS | BODY MASS INDEX: 25.84 KG/M2 | TEMPERATURE: 98.6 F | OXYGEN SATURATION: 95 % | HEIGHT: 65 IN | RESPIRATION RATE: 18 BRPM | HEART RATE: 66 BPM | SYSTOLIC BLOOD PRESSURE: 156 MMHG | DIASTOLIC BLOOD PRESSURE: 65 MMHG

## 2017-08-05 PROBLEM — K29.00 ACUTE SUPERFICIAL GASTRITIS WITHOUT HEMORRHAGE: Status: ACTIVE | Noted: 2017-08-05

## 2017-08-05 PROBLEM — A04.72 C. DIFFICILE COLITIS: Status: ACTIVE | Noted: 2017-08-05

## 2017-08-05 PROBLEM — K57.30 SIGMOID DIVERTICULOSIS: Status: ACTIVE | Noted: 2017-08-05

## 2017-08-05 PROBLEM — K52.9 COLITIS: Status: ACTIVE | Noted: 2017-08-05

## 2017-08-05 LAB
ANION GAP SERPL CALCULATED.3IONS-SCNC: 13 MMOL/L (ref 9–17)
BUN BLDV-MCNC: 9 MG/DL (ref 8–23)
BUN/CREAT BLD: 13 (ref 9–20)
CALCIUM SERPL-MCNC: 8.8 MG/DL (ref 8.6–10.4)
CHLORIDE BLD-SCNC: 108 MMOL/L (ref 98–107)
CO2: 22 MMOL/L (ref 20–31)
CREAT SERPL-MCNC: 0.71 MG/DL (ref 0.5–0.9)
GFR AFRICAN AMERICAN: >60 ML/MIN
GFR NON-AFRICAN AMERICAN: >60 ML/MIN
GFR SERPL CREATININE-BSD FRML MDRD: ABNORMAL ML/MIN/{1.73_M2}
GFR SERPL CREATININE-BSD FRML MDRD: ABNORMAL ML/MIN/{1.73_M2}
GLUCOSE BLD-MCNC: 103 MG/DL (ref 70–99)
HCT VFR BLD CALC: 32.9 % (ref 36–46)
HCT VFR BLD CALC: 35.3 % (ref 36–46)
HEMOGLOBIN: 11.2 G/DL (ref 12–16)
HEMOGLOBIN: 12.1 G/DL (ref 12–16)
LV EF: 55 %
LVEF MODALITY: NORMAL
MCH RBC QN AUTO: 31.8 PG (ref 26–34)
MCHC RBC AUTO-ENTMCNC: 34.2 G/DL (ref 31–37)
MCV RBC AUTO: 93 FL (ref 80–100)
PDW BLD-RTO: 14.3 % (ref 11.5–14.5)
PLATELET # BLD: 313 K/UL (ref 130–400)
PMV BLD AUTO: 7.9 FL (ref 6–12)
POTASSIUM SERPL-SCNC: 3.8 MMOL/L (ref 3.7–5.3)
RBC # BLD: 3.79 M/UL (ref 4–5.2)
SODIUM BLD-SCNC: 143 MMOL/L (ref 135–144)
WBC # BLD: 11.5 K/UL (ref 3.5–11)

## 2017-08-05 PROCEDURE — 6370000000 HC RX 637 (ALT 250 FOR IP): Performed by: FAMILY MEDICINE

## 2017-08-05 PROCEDURE — 6370000000 HC RX 637 (ALT 250 FOR IP): Performed by: NURSE PRACTITIONER

## 2017-08-05 PROCEDURE — 6370000000 HC RX 637 (ALT 250 FOR IP): Performed by: INTERNAL MEDICINE

## 2017-08-05 PROCEDURE — 80048 BASIC METABOLIC PNL TOTAL CA: CPT

## 2017-08-05 PROCEDURE — 2580000003 HC RX 258: Performed by: INTERNAL MEDICINE

## 2017-08-05 PROCEDURE — 36415 COLL VENOUS BLD VENIPUNCTURE: CPT

## 2017-08-05 PROCEDURE — 85027 COMPLETE CBC AUTOMATED: CPT

## 2017-08-05 PROCEDURE — 93306 TTE W/DOPPLER COMPLETE: CPT

## 2017-08-05 PROCEDURE — C9113 INJ PANTOPRAZOLE SODIUM, VIA: HCPCS | Performed by: INTERNAL MEDICINE

## 2017-08-05 PROCEDURE — 99232 SBSQ HOSP IP/OBS MODERATE 35: CPT | Performed by: INTERNAL MEDICINE

## 2017-08-05 PROCEDURE — 6360000002 HC RX W HCPCS: Performed by: INTERNAL MEDICINE

## 2017-08-05 RX ORDER — PANTOPRAZOLE SODIUM 40 MG/1
40 TABLET, DELAYED RELEASE ORAL
Status: DISCONTINUED | OUTPATIENT
Start: 2017-08-06 | End: 2017-08-05 | Stop reason: HOSPADM

## 2017-08-05 RX ORDER — METRONIDAZOLE 500 MG/1
500 TABLET ORAL EVERY 8 HOURS SCHEDULED
Qty: 21 TABLET | Refills: 0 | Status: SHIPPED | OUTPATIENT
Start: 2017-08-05 | End: 2017-08-12

## 2017-08-05 RX ORDER — PANTOPRAZOLE SODIUM 40 MG/1
40 TABLET, DELAYED RELEASE ORAL
Qty: 30 TABLET | Refills: 3 | Status: SHIPPED | OUTPATIENT
Start: 2017-08-06 | End: 2017-08-22 | Stop reason: ALTCHOICE

## 2017-08-05 RX ADMIN — GABAPENTIN 800 MG: 400 CAPSULE ORAL at 09:07

## 2017-08-05 RX ADMIN — ATENOLOL 25 MG: 25 TABLET ORAL at 09:07

## 2017-08-05 RX ADMIN — METRONIDAZOLE 500 MG: 500 TABLET, FILM COATED ORAL at 05:40

## 2017-08-05 RX ADMIN — SODIUM CHLORIDE 8 MG/HR: 9 INJECTION, SOLUTION INTRAVENOUS at 03:53

## 2017-08-07 LAB
ABO/RH: NORMAL
ANTIBODY IDENTIFICATION: NORMAL
ANTIBODY SCREEN: POSITIVE
ARM BAND NUMBER: NORMAL
BLD PROD TYP BPU: NORMAL
BLD PROD TYP BPU: NORMAL
CROSSMATCH RESULT: NORMAL
CROSSMATCH RESULT: NORMAL
DISPENSE STATUS BLOOD BANK: NORMAL
DISPENSE STATUS BLOOD BANK: NORMAL
EXPIRATION DATE: NORMAL
TRANSFUSION STATUS: NORMAL
TRANSFUSION STATUS: NORMAL
UNIT DIVISION: 0
UNIT DIVISION: 0
UNIT NUMBER: NORMAL
UNIT NUMBER: NORMAL

## 2017-08-08 ENCOUNTER — TELEPHONE (OUTPATIENT)
Dept: FAMILY MEDICINE CLINIC | Age: 72
End: 2017-08-08

## 2017-08-08 LAB — SURGICAL PATHOLOGY REPORT: NORMAL

## 2017-08-08 RX ORDER — METOPROLOL SUCCINATE 25 MG/1
25 TABLET, EXTENDED RELEASE ORAL DAILY
Qty: 30 TABLET | Refills: 3 | Status: SHIPPED | OUTPATIENT
Start: 2017-08-08 | End: 2017-10-17 | Stop reason: ALTCHOICE

## 2017-08-15 ENCOUNTER — TELEPHONE (OUTPATIENT)
Dept: FAMILY MEDICINE CLINIC | Age: 72
End: 2017-08-15

## 2017-08-15 DIAGNOSIS — K29.51 GASTROINTESTINAL HEMORRHAGE ASSOCIATED WITH CHRONIC GASTRITIS: Primary | ICD-10-CM

## 2017-08-16 RX ORDER — OMEPRAZOLE 40 MG/1
40 CAPSULE, DELAYED RELEASE ORAL DAILY
Qty: 30 CAPSULE | Refills: 3 | Status: SHIPPED | OUTPATIENT
Start: 2017-08-16 | End: 2017-11-12 | Stop reason: SDUPTHER

## 2017-08-22 ENCOUNTER — OFFICE VISIT (OUTPATIENT)
Dept: FAMILY MEDICINE CLINIC | Age: 72
End: 2017-08-22
Payer: MEDICARE

## 2017-08-22 VITALS
BODY MASS INDEX: 26.16 KG/M2 | SYSTOLIC BLOOD PRESSURE: 162 MMHG | HEART RATE: 64 BPM | DIASTOLIC BLOOD PRESSURE: 84 MMHG | WEIGHT: 157 LBS | HEIGHT: 65 IN

## 2017-08-22 DIAGNOSIS — M15.9 PRIMARY OSTEOARTHRITIS INVOLVING MULTIPLE JOINTS: ICD-10-CM

## 2017-08-22 DIAGNOSIS — I25.10 CORONARY ARTERY DISEASE INVOLVING NATIVE CORONARY ARTERY OF NATIVE HEART WITHOUT ANGINA PECTORIS: ICD-10-CM

## 2017-08-22 DIAGNOSIS — Z12.31 ENCOUNTER FOR SCREENING MAMMOGRAM FOR BREAST CANCER: ICD-10-CM

## 2017-08-22 DIAGNOSIS — I10 ESSENTIAL HYPERTENSION: Primary | ICD-10-CM

## 2017-08-22 DIAGNOSIS — E78.2 MIXED HYPERLIPIDEMIA: ICD-10-CM

## 2017-08-22 PROBLEM — M15.0 PRIMARY OSTEOARTHRITIS INVOLVING MULTIPLE JOINTS: Status: ACTIVE | Noted: 2017-08-22

## 2017-08-22 PROCEDURE — 99214 OFFICE O/P EST MOD 30 MIN: CPT | Performed by: FAMILY MEDICINE

## 2017-08-22 RX ORDER — METRONIDAZOLE 500 MG/1
500 TABLET ORAL
COMMUNITY
End: 2017-08-22 | Stop reason: CLARIF

## 2017-08-22 ASSESSMENT — ENCOUNTER SYMPTOMS
COUGH: 0
HEARTBURN: 0
NAUSEA: 0
ABDOMINAL PAIN: 0
EYES NEGATIVE: 1
SHORTNESS OF BREATH: 1

## 2017-08-22 ASSESSMENT — PATIENT HEALTH QUESTIONNAIRE - PHQ9
SUM OF ALL RESPONSES TO PHQ QUESTIONS 1-9: 1
SUM OF ALL RESPONSES TO PHQ9 QUESTIONS 1 & 2: 1
2. FEELING DOWN, DEPRESSED OR HOPELESS: 1
1. LITTLE INTEREST OR PLEASURE IN DOING THINGS: 0

## 2017-09-19 ENCOUNTER — OFFICE VISIT (OUTPATIENT)
Dept: GASTROENTEROLOGY | Age: 72
End: 2017-09-19
Payer: MEDICARE

## 2017-09-19 VITALS
HEIGHT: 65 IN | WEIGHT: 160 LBS | TEMPERATURE: 97.3 F | DIASTOLIC BLOOD PRESSURE: 87 MMHG | SYSTOLIC BLOOD PRESSURE: 184 MMHG | HEART RATE: 61 BPM | OXYGEN SATURATION: 95 % | RESPIRATION RATE: 14 BRPM | BODY MASS INDEX: 26.66 KG/M2

## 2017-09-19 DIAGNOSIS — K21.9 GASTROESOPHAGEAL REFLUX DISEASE WITHOUT ESOPHAGITIS: ICD-10-CM

## 2017-09-19 DIAGNOSIS — K57.30 SIGMOID DIVERTICULOSIS: ICD-10-CM

## 2017-09-19 DIAGNOSIS — A04.72 C. DIFFICILE COLITIS: Primary | ICD-10-CM

## 2017-09-19 DIAGNOSIS — K29.00 ACUTE SUPERFICIAL GASTRITIS WITHOUT HEMORRHAGE: ICD-10-CM

## 2017-09-19 PROCEDURE — 99214 OFFICE O/P EST MOD 30 MIN: CPT | Performed by: INTERNAL MEDICINE

## 2017-09-19 ASSESSMENT — ENCOUNTER SYMPTOMS
RESPIRATORY NEGATIVE: 1
GASTROINTESTINAL NEGATIVE: 1
VOMITING: 0
ABDOMINAL DISTENTION: 0
DIARRHEA: 0
ALLERGIC/IMMUNOLOGIC NEGATIVE: 1
CONSTIPATION: 0
BLOOD IN STOOL: 0
RECTAL PAIN: 0
BACK PAIN: 1
ABDOMINAL PAIN: 0
NAUSEA: 0
ANAL BLEEDING: 0

## 2017-09-25 ENCOUNTER — TELEPHONE (OUTPATIENT)
Dept: FAMILY MEDICINE CLINIC | Age: 72
End: 2017-09-25

## 2017-09-25 DIAGNOSIS — Z12.31 ENCOUNTER FOR SCREENING MAMMOGRAM FOR BREAST CANCER: Primary | ICD-10-CM

## 2017-10-09 RX ORDER — LEVOTHYROXINE SODIUM 0.1 MG/1
100 TABLET ORAL DAILY
Qty: 30 TABLET | Refills: 3 | Status: SHIPPED | OUTPATIENT
Start: 2017-10-09 | End: 2018-02-11 | Stop reason: SDUPTHER

## 2017-10-09 RX ORDER — ATENOLOL 25 MG/1
25 TABLET ORAL DAILY
Qty: 30 TABLET | Refills: 3 | Status: SHIPPED | OUTPATIENT
Start: 2017-10-09 | End: 2018-02-04 | Stop reason: SDUPTHER

## 2017-10-12 ENCOUNTER — TELEPHONE (OUTPATIENT)
Dept: FAMILY MEDICINE CLINIC | Age: 72
End: 2017-10-12

## 2017-10-17 ENCOUNTER — OFFICE VISIT (OUTPATIENT)
Dept: FAMILY MEDICINE CLINIC | Age: 72
End: 2017-10-17
Payer: MEDICARE

## 2017-10-17 VITALS
SYSTOLIC BLOOD PRESSURE: 138 MMHG | OXYGEN SATURATION: 97 % | DIASTOLIC BLOOD PRESSURE: 78 MMHG | HEIGHT: 65 IN | HEART RATE: 63 BPM | BODY MASS INDEX: 26.99 KG/M2 | WEIGHT: 162 LBS

## 2017-10-17 DIAGNOSIS — I10 ESSENTIAL HYPERTENSION: Primary | ICD-10-CM

## 2017-10-17 DIAGNOSIS — M15.9 PRIMARY OSTEOARTHRITIS INVOLVING MULTIPLE JOINTS: ICD-10-CM

## 2017-10-17 DIAGNOSIS — Z23 NEED FOR ZOSTAVAX ADMINISTRATION: ICD-10-CM

## 2017-10-17 DIAGNOSIS — K21.9 GASTROESOPHAGEAL REFLUX DISEASE WITHOUT ESOPHAGITIS: ICD-10-CM

## 2017-10-17 PROCEDURE — 99214 OFFICE O/P EST MOD 30 MIN: CPT | Performed by: FAMILY MEDICINE

## 2017-10-17 ASSESSMENT — ENCOUNTER SYMPTOMS
EYES NEGATIVE: 1
COUGH: 0
ABDOMINAL PAIN: 0
HEARTBURN: 1
SHORTNESS OF BREATH: 1

## 2017-10-17 NOTE — PROGRESS NOTES
Visit Information    Have you changed or started any medications since your last visit including any over-the-counter medicines, vitamins, or herbal medicines? no   Have you stopped taking any of your medications? Is so, why? -  no  Are you having any side effects from any of your medications? - yes -     Have you seen any other physician or provider since your last visit? yes    Have you had any other diagnostic tests since your last visit?  no   Have you been seen in the emergency room and/or had an admission in a hospital since we last saw you?  yes -    Have you had your routine dental cleaning in the past 6 months?  no     Do you have an active MyChart account? If no, what is the barrier?   No:     Patient Care Team:  Irma Hand MD as PCP - General (Family Medicine)    Medical History Review  Past Medical, Family, and Social History reviewed and does contribute to the patient presenting condition    Health Maintenance   Topic Date Due    Zostavax vaccine  03/21/2003    Breast cancer screen  02/13/2016    Colon Cancer Screen FIT/FOBT  08/03/2018    DTaP/Tdap/Td vaccine (2 - Td) 04/04/2021    Lipid screen  04/14/2022    DEXA (modify frequency per FRAX score)  Completed    Flu vaccine  Completed    Pneumococcal low/med risk  Completed

## 2017-10-17 NOTE — PROGRESS NOTES
[facility] PHYSICIANS  KIRTI HAMEED [facility]  [address] [address]  Dept: [phone]    10/17/2017    CHIEF COMPLAINT    Chief Complaint   Patient presents with    Follow-Up from Hospital     heartburn       HPI    Pito Serrato is a 74 y.o. female who presents   Chief Complaint   Patient presents with    Follow-Up from Hospital     heartburn   . Follow up after hospital stay for chest pain. Heart cath showed a small vessel that was blocked. Seeing cardiologist, Dr. Phylicia Arora. No change in meds. Is taking ppi for heartburn. Hypertension   This is a chronic problem. The current episode started more than 1 year ago. The problem is unchanged. The problem is controlled. Associated symptoms include chest pain (resolved currently), malaise/fatigue and shortness of breath (with exertion). Pertinent negatives include no anxiety or palpitations. Agents associated with hypertension include thyroid hormones. Risk factors for coronary artery disease include post-menopausal state and sedentary lifestyle. Past treatments include beta blockers. REVIEW OF SYSTEMS    Review of Systems   Constitutional: Positive for malaise/fatigue. Negative for fever and weight loss. HENT: Negative. Eyes: Negative. Respiratory: Positive for shortness of breath (with exertion). Negative for cough. Cardiovascular: Positive for chest pain (resolved currently). Negative for palpitations and leg swelling. Gastrointestinal: Positive for heartburn (controlled now on ppi). Negative for abdominal pain. Genitourinary: Negative for frequency and urgency. Musculoskeletal: Positive for joint pain (oa of multiple joints). Skin: Negative. Neurological: Positive for sensory change (rt lower leg). Negative for dizziness. Endo/Heme/Allergies: Negative. Psychiatric/Behavioral: Negative for depression. The patient is not nervous/anxious and does not have insomnia.

## 2017-10-25 DIAGNOSIS — Z12.31 ENCOUNTER FOR SCREENING MAMMOGRAM FOR BREAST CANCER: ICD-10-CM

## 2017-11-12 DIAGNOSIS — K29.51 GASTROINTESTINAL HEMORRHAGE ASSOCIATED WITH CHRONIC GASTRITIS: ICD-10-CM

## 2017-11-13 RX ORDER — OMEPRAZOLE 40 MG/1
40 CAPSULE, DELAYED RELEASE ORAL DAILY
Qty: 30 CAPSULE | Refills: 0 | Status: SHIPPED | OUTPATIENT
Start: 2017-11-13 | End: 2018-01-05 | Stop reason: SDUPTHER

## 2017-11-13 NOTE — TELEPHONE ENCOUNTER
Pharmacy request  Health Maintenance   Topic Date Due    Zostavax vaccine  03/21/2003    Colon Cancer Screen FIT/FOBT  08/03/2018    Breast cancer screen  10/24/2019    DTaP/Tdap/Td vaccine (2 - Td) 04/04/2021    Lipid screen  04/14/2022    DEXA (modify frequency per FRAX score)  Completed    Flu vaccine  Completed    Pneumococcal low/med risk  Completed             (applicable per patient's age: Cancer Screenings, Depression Screening, Fall Risk Screening, Immunizations)    LDL Cholesterol (mg/dL)   Date Value   08/12/2013 101 (H)     AST (U/L)   Date Value   02/15/2017 21     ALT (U/L)   Date Value   02/15/2017 21     BUN (mg/dL)   Date Value   08/05/2017 9      (goal A1C is < 7)   (goal LDL is <100) need 30-50% reduction from baseline     BP Readings from Last 3 Encounters:   10/17/17 138/78   09/19/17 (!) 184/87   08/22/17 (!) 162/84    (goal /80)      All Future Testing planned in [facility]:  Lab Frequency Next Occurrence   RONIT Digital Screen Left Once 11/23/2017   RONIT Digital Screen Right Once 11/23/2017       Next Visit Date:  Future Appointments  Date Time Provider [facility]   3/27/2018 1:00 PM Juni Rice MD t[facility] exc [facility]   4/17/2018 3:15 PM MD carolina Cabrera [facility]            Patient Active Problem List:     Primary osteoarthritis of left hip     Essential hypertension     Hypothyroid     Fatigue     CAD (coronary artery disease)     Osteoarthritis     Hyperlipidemia     Hypertension     Hypothyroidism     Right foot drop     Coffee ground emesis     Bleeding per rectum     Gastrointestinal hemorrhage     NSVT (nonsustained ventricular tachycardia) (HCC)     Acute superficial gastritis without hemorrhage     Sigmoid diverticulosis     C. difficile colitis     Primary osteoarthritis involving multiple joints     Mixed hyperlipidemia     Coronary artery disease involving native coronary artery of native heart without angina pectoris

## 2018-01-05 ENCOUNTER — OFFICE VISIT (OUTPATIENT)
Dept: FAMILY MEDICINE CLINIC | Age: 73
End: 2018-01-05
Payer: MEDICARE

## 2018-01-05 ENCOUNTER — HOSPITAL ENCOUNTER (OUTPATIENT)
Age: 73
Setting detail: SPECIMEN
Discharge: HOME | End: 2018-01-05
Payer: MEDICARE

## 2018-01-05 VITALS
HEART RATE: 62 BPM | SYSTOLIC BLOOD PRESSURE: 138 MMHG | BODY MASS INDEX: 26.63 KG/M2 | DIASTOLIC BLOOD PRESSURE: 82 MMHG | WEIGHT: 160 LBS

## 2018-01-05 DIAGNOSIS — R30.0 DYSURIA: Primary | ICD-10-CM

## 2018-01-05 DIAGNOSIS — K29.50 OTHER CHRONIC GASTRITIS WITHOUT HEMORRHAGE: ICD-10-CM

## 2018-01-05 DIAGNOSIS — N32.81 OVERACTIVE BLADDER: ICD-10-CM

## 2018-01-05 DIAGNOSIS — R30.0 DYSURIA: ICD-10-CM

## 2018-01-05 LAB
BILIRUBIN, POC: NORMAL
BLOOD URINE, POC: NORMAL
CLARITY, POC: CLEAR
COLOR, POC: NORMAL
GLUCOSE URINE, POC: NORMAL
KETONES, POC: NORMAL
LEUKOCYTE EST, POC: NORMAL
NITRITE, POC: NORMAL
PH, POC: 6
PROTEIN, POC: NORMAL
SPECIFIC GRAVITY, POC: 1
UROBILINOGEN, POC: 0.2

## 2018-01-05 PROCEDURE — 81003 URINALYSIS AUTO W/O SCOPE: CPT | Performed by: FAMILY MEDICINE

## 2018-01-05 PROCEDURE — 99213 OFFICE O/P EST LOW 20 MIN: CPT | Performed by: FAMILY MEDICINE

## 2018-01-05 RX ORDER — OMEPRAZOLE 40 MG/1
40 CAPSULE, DELAYED RELEASE ORAL DAILY
Qty: 30 CAPSULE | Refills: 0 | Status: SHIPPED | OUTPATIENT
Start: 2018-01-05 | End: 2018-02-04 | Stop reason: SDUPTHER

## 2018-01-05 RX ORDER — PANTOPRAZOLE SODIUM 40 MG/1
40 TABLET, DELAYED RELEASE ORAL DAILY
COMMUNITY
End: 2018-01-05 | Stop reason: ALTCHOICE

## 2018-01-05 RX ORDER — PHENAZOPYRIDINE HYDROCHLORIDE 200 MG/1
200 TABLET, FILM COATED ORAL 3 TIMES DAILY PRN
Qty: 30 TABLET | Refills: 1 | Status: SHIPPED | OUTPATIENT
Start: 2018-01-05 | End: 2018-01-15

## 2018-01-05 ASSESSMENT — ENCOUNTER SYMPTOMS
SHORTNESS OF BREATH: 0
HEARTBURN: 1
COUGH: 0
EYES NEGATIVE: 1
ABDOMINAL PAIN: 0

## 2018-01-05 NOTE — PROGRESS NOTES
Lumbar radiculopathy     Osteoarthritis     Osteopenia     Tremor     hand       FAMILY HISTORY    Family History   Problem Relation Age of Onset    Heart Disease Mother     Heart Disease Father     Arthritis Father        SOCIAL HISTORY    Social History     Social History    Marital status:      Spouse name: N/A    Number of children: N/A    Years of education: N/A     Social History Main Topics    Smoking status: Former Smoker     Quit date: 2/10/2007    Smokeless tobacco: Never Used    Alcohol use No    Drug use: No    Sexual activity: Not Asked     Other Topics Concern    None     Social History Narrative    None       SURGICAL HISTORY    Past Surgical History:   Procedure Laterality Date    BLADDER SUSPENSION      CARDIAC CATHETERIZATION  01/2015, 9/2017    COLONOSCOPY  2012    COLONOSCOPY  08/04/2017    PSEUDOMEMBRANOUS COLITIS WITH MUCOSAL ISCHEMIA, C DIFF POS    DILATION AND CURETTAGE OF UTERUS      JOINT REPLACEMENT Right [id number]    hip-complicated with rt drop foot-Dr. Maryana Luther JOINT REPLACEMENT Left 2017    hip-Dr. Stephy Louis    SD COLONOSCOPY W/BIOPSY SINGLE/MULTIPLE N/A 8/4/2017    COLONOSCOPY WITH BIOPSY performed by Abdelrahman Osorio MD at [address] EGD TRANSORAL BIOPSY SINGLE/MULTIPLE N/A 8/4/2017    EGD BIOPSY performed by Abdelrahman Osorio MD at [address] Left 03/07/2017    UPPER GASTROINTESTINAL ENDOSCOPY  08/04/2017    MILD CHRONIC GASTRITIS        CURRENT MEDICATIONS    Current Outpatient Prescriptions   Medication Sig Dispense Refill    omeprazole (PRILOSEC) 40 MG delayed release capsule Take 1 capsule by mouth daily 30 capsule 0    phenazopyridine (PYRIDIUM) 200 MG tablet Take 1 tablet by mouth 3 times daily as needed for Pain 30 tablet 1    diclofenac sodium (VOLTAREN) 1 % GEL Apply 4 g topically every 4 hours as needed for Pain 1 Tube 5    levothyroxine (SYNTHROID) 100 MCG tablet TAKE 1 refer to urologist. ua-negative. - phenazopyridine (PYRIDIUM) 200 MG tablet; Take 1 tablet by mouth 3 times daily as needed for Pain  Dispense: 30 tablet; Refill: 1  - Urine Culture; Future    2. Overactive bladder  Chronic. May need uro referral. Await culture. - Urine Culture; Future    3. Other chronic gastritis without hemorrhage  Chronic, cont ppi. Damaris Chaparro received counseling on the following healthy behaviors: medication adherence  Reviewed prior labs and health maintenance. Continue current medications, diet and exercise. Discussed use, benefit, and side effects of prescribed medications. Barriers to medication compliance addressed. Patient given educational materials - see patient instructions. All patient questions answered. Patient voiced understanding. Return if symptoms worsen or fail to improve.         Electronically signed by Charles Bosch MD on 1/5/18 at 11:32 AM

## 2018-01-06 LAB
CULTURE: NORMAL
CULTURE: NORMAL
Lab: NORMAL
SPECIMEN DESCRIPTION: NORMAL
STATUS: NORMAL

## 2018-01-17 ENCOUNTER — TELEPHONE (OUTPATIENT)
Dept: FAMILY MEDICINE CLINIC | Age: 73
End: 2018-01-17

## 2018-01-17 DIAGNOSIS — R30.0 DYSURIA: Primary | ICD-10-CM

## 2018-01-17 NOTE — TELEPHONE ENCOUNTER
Patient did not want to go to the [place] office so the referral was faxed to the [facility] office and number was given to patient

## 2018-01-17 NOTE — TELEPHONE ENCOUNTER
Pt states that she still has burning with urination, she is asking for a referral to a [employer]. Pt has Saul [facility] [address] Maintenance   Topic Date Due    Zostavax vaccine  03/21/2003    TSH testing  04/14/2018    Colon Cancer Screen FIT/FOBT  08/03/2018    Potassium monitoring  08/05/2018    Creatinine monitoring  08/05/2018    Breast cancer screen  10/24/2019    DTaP/Tdap/Td vaccine (2 - Td) 04/04/2021    Lipid screen  04/14/2022    DEXA (modify frequency per FRAX score)  Completed    Flu vaccine  Completed    Pneumococcal low/med risk  Completed             (applicable per patient's age: Cancer Screenings, Depression Screening, Fall Risk Screening, Immunizations)    LDL Cholesterol (mg/dL)   Date Value   08/12/2013 101 (H)     AST (U/L)   Date Value   02/15/2017 21     ALT (U/L)   Date Value   02/15/2017 21     BUN (mg/dL)   Date Value   08/05/2017 9      (goal A1C is < 7)   (goal LDL is <100) need 30-50% reduction from baseline     BP Readings from Last 3 Encounters:   01/05/18 138/82   10/17/17 138/78   09/19/17 (!) 184/87    (goal /80)      All Future Testing planned in [facility]:  Lab Frequency Next Occurrence   RONIT Digital Screen Left Once 11/23/2017   RONIT Digital Screen Right Once 11/23/2017       Next Visit Date:  Future Appointments  Date Time Provider [facility]   3/27/2018 1:00 PM Ulises Huizar MD [facility] exc [address] [facility]   4/17/2018 3:15 PM MD carolina Lange fp [facility]            Patient Active Problem List:     Primary osteoarthritis of left hip     Essential hypertension     Hypothyroid     Fatigue     CAD (coronary artery disease)     Osteoarthritis     Hyperlipidemia     Hypertension     Hypothyroidism     Right foot drop     Coffee ground emesis     Bleeding per rectum     Gastrointestinal hemorrhage     NSVT (nonsustained ventricular tachycardia) (HCC)     Acute superficial gastritis without hemorrhage     Sigmoid diverticulosis     C. difficile colitis     Primary

## 2018-02-05 RX ORDER — OMEPRAZOLE 40 MG/1
40 CAPSULE, DELAYED RELEASE ORAL DAILY
Qty: 30 CAPSULE | Refills: 0 | Status: SHIPPED | OUTPATIENT
Start: 2018-02-05 | End: 2018-03-18 | Stop reason: SDUPTHER

## 2018-02-05 RX ORDER — ATENOLOL 25 MG/1
25 TABLET ORAL DAILY
Qty: 30 TABLET | Refills: 3 | Status: SHIPPED | OUTPATIENT
Start: 2018-02-05 | End: 2018-06-03 | Stop reason: SDUPTHER

## 2018-02-11 RX ORDER — LEVOTHYROXINE SODIUM 0.1 MG/1
100 TABLET ORAL DAILY
Qty: 30 TABLET | Refills: 3 | Status: SHIPPED | OUTPATIENT
Start: 2018-02-11 | End: 2018-06-17 | Stop reason: SDUPTHER

## 2018-03-05 RX ORDER — GABAPENTIN 600 MG/1
1200 TABLET ORAL 4 TIMES DAILY
Qty: 120 TABLET | Refills: 0 | Status: SHIPPED | OUTPATIENT
Start: 2018-03-05 | End: 2018-04-01 | Stop reason: SDUPTHER

## 2018-03-05 NOTE — TELEPHONE ENCOUNTER
Fax from pharmacy  Health Maintenance   Topic Date Due    Shingles Vaccine (1 of 2 - 2 Dose Series) 03/21/1993    TSH testing  04/14/2018    Colon Cancer Screen FIT/FOBT  08/03/2018    Potassium monitoring  08/05/2018    Creatinine monitoring  08/05/2018    Breast cancer screen  10/24/2019    DTaP/Tdap/Td vaccine (2 - Td) 04/04/2021    Lipid screen  04/14/2022    DEXA (modify frequency per FRAX score)  Completed    Flu vaccine  Completed    Pneumococcal low/med risk  Completed             (applicable per patient's age: Cancer Screenings, Depression Screening, Fall Risk Screening, Immunizations)    LDL Cholesterol (mg/dL)   Date Value   08/12/2013 101 (H)     AST (U/L)   Date Value   02/15/2017 21     ALT (U/L)   Date Value   02/15/2017 21     BUN (mg/dL)   Date Value   08/05/2017 9      (goal A1C is < 7)   (goal LDL is <100) need 30-50% reduction from baseline     BP Readings from Last 3 Encounters:   01/05/18 138/82   10/17/17 138/78   09/19/17 (!) 184/87    (goal /80)      All Future Testing planned in [facility]:  Lab Frequency Next Occurrence   RONIT Digital Screen Left Once 11/23/2017   RONIT Digital Screen Right Once 11/23/2017       Next Visit Date:  Future Appointments  Date Time Provider Jovany Chandler   3/27/2018 1:00 PM MD valeria [facility]   4/17/2018 3:15 PM MD carolina López [facility]            Patient Active Problem List:     Primary osteoarthritis of left hip     Essential hypertension     Hypothyroid     Fatigue     CAD (coronary artery disease)     Osteoarthritis     Hyperlipidemia     Hypertension     Hypothyroidism     Right foot drop     Coffee ground emesis     Bleeding per rectum     Gastrointestinal hemorrhage     NSVT (nonsustained ventricular tachycardia) (HCC)     Acute superficial gastritis without hemorrhage     Sigmoid diverticulosis     C. difficile colitis     Primary osteoarthritis involving multiple joints     Mixed hyperlipidemia     Coronary

## 2018-03-18 RX ORDER — OMEPRAZOLE 40 MG/1
40 CAPSULE, DELAYED RELEASE ORAL DAILY
Qty: 30 CAPSULE | Refills: 0 | Status: SHIPPED | OUTPATIENT
Start: 2018-03-18 | End: 2018-04-15 | Stop reason: SDUPTHER

## 2018-03-20 ENCOUNTER — TELEPHONE (OUTPATIENT)
Dept: FAMILY MEDICINE CLINIC | Age: 73
End: 2018-03-20

## 2018-03-28 ENCOUNTER — OFFICE VISIT (OUTPATIENT)
Dept: FAMILY MEDICINE CLINIC | Age: 73
End: 2018-03-28
Payer: MEDICARE

## 2018-03-28 VITALS — DIASTOLIC BLOOD PRESSURE: 80 MMHG | HEART RATE: 66 BPM | SYSTOLIC BLOOD PRESSURE: 142 MMHG

## 2018-03-28 DIAGNOSIS — K21.9 GASTROESOPHAGEAL REFLUX DISEASE WITHOUT ESOPHAGITIS: Primary | ICD-10-CM

## 2018-03-28 DIAGNOSIS — I10 ESSENTIAL HYPERTENSION: ICD-10-CM

## 2018-03-28 DIAGNOSIS — M51.36 DDD (DEGENERATIVE DISC DISEASE), LUMBAR: ICD-10-CM

## 2018-03-28 DIAGNOSIS — M15.9 PRIMARY OSTEOARTHRITIS INVOLVING MULTIPLE JOINTS: ICD-10-CM

## 2018-03-28 PROBLEM — E03.9 HYPOTHYROID: Status: RESOLVED | Noted: 2017-03-08 | Resolved: 2018-03-28

## 2018-03-28 PROBLEM — M16.12 PRIMARY OSTEOARTHRITIS OF LEFT HIP: Chronic | Status: RESOLVED | Noted: 2017-03-07 | Resolved: 2018-03-28

## 2018-03-28 PROCEDURE — 99214 OFFICE O/P EST MOD 30 MIN: CPT | Performed by: FAMILY MEDICINE

## 2018-03-28 RX ORDER — SUCRALFATE 1 G/1
1 TABLET ORAL
COMMUNITY
Start: 2018-03-19 | End: 2018-03-28 | Stop reason: SINTOL

## 2018-03-28 RX ORDER — HYDROCODONE BITARTRATE AND ACETAMINOPHEN 5; 325 MG/1; MG/1
1 TABLET ORAL EVERY 8 HOURS PRN
Qty: 30 TABLET | Refills: 0 | Status: SHIPPED | OUTPATIENT
Start: 2018-03-28 | End: 2018-04-24 | Stop reason: SDUPTHER

## 2018-03-28 ASSESSMENT — ENCOUNTER SYMPTOMS
SHORTNESS OF BREATH: 0
CONSTIPATION: 0
NAUSEA: 0
HEARTBURN: 1
ABDOMINAL PAIN: 1
BACK PAIN: 1
EYES NEGATIVE: 1
COUGH: 0
DIARRHEA: 0
BLOOD IN STOOL: 0
VOMITING: 0

## 2018-03-28 NOTE — PROGRESS NOTES
[facility] PHYSICIANS  KIRTI HAMEED [facility]ILY [facility]  [address] [facility] [address] [address]  Dept: [phone]    3/28/2018    CHIEF COMPLAINT    Chief Complaint   Patient presents with    [facility] 74 [facility] who presents   Chief Complaint   Patient presents with    Follow-Up from Hospital   .  Ran out of ppi and had severe heartburn sx. Went to ER. Labs and ekg were done. Gastroesophageal Reflux   She complains of abdominal pain and heartburn. She reports no chest pain, no coughing or no nausea. This is a chronic problem. The current episode started more than 1 year ago. The problem occurs constantly. The problem has been gradually improving. The heartburn is of moderate intensity. Pertinent negatives include no melena or weight loss. She has tried a PPI for the symptoms. The treatment provided moderate relief. REVIEW OF SYSTEMS    Review of Systems   Constitutional: Negative for fever, malaise/fatigue and weight loss. HENT: Negative. Eyes: Negative. Respiratory: Negative for cough and shortness of breath. Cardiovascular: Negative for chest pain, palpitations and leg swelling. Gastrointestinal: Positive for abdominal pain and heartburn. Negative for blood in stool, constipation, diarrhea, melena, nausea and vomiting. Genitourinary: Negative for frequency and urgency. Musculoskeletal: Positive for back pain and joint pain. Chronic OA   Skin: Negative. Neurological: Positive for sensory change (rt lower leg, rt foot drop). Negative for dizziness. Endo/Heme/Allergies: Negative. Psychiatric/Behavioral: Negative for depression. The patient is not nervous/anxious and does not have insomnia.         PAST MEDICAL HISTORY    Past Medical History:   Diagnosis Date    Arthritis     CAD (coronary artery disease)     Esophageal reflux     Foot drop, right foot     Heart attack     patient does

## 2018-04-24 DIAGNOSIS — M15.9 PRIMARY OSTEOARTHRITIS INVOLVING MULTIPLE JOINTS: ICD-10-CM

## 2018-04-24 DIAGNOSIS — M51.36 DDD (DEGENERATIVE DISC DISEASE), LUMBAR: ICD-10-CM

## 2018-04-24 RX ORDER — HYDROCODONE BITARTRATE AND ACETAMINOPHEN 5; 325 MG/1; MG/1
1 TABLET ORAL EVERY 8 HOURS PRN
Qty: 30 TABLET | Refills: 0 | Status: SHIPPED | OUTPATIENT
Start: 2018-04-24 | End: 2018-05-22 | Stop reason: SDUPTHER

## 2018-05-07 RX ORDER — GABAPENTIN 600 MG/1
1200 TABLET ORAL 3 TIMES DAILY
Qty: 180 TABLET | Refills: 1 | Status: SHIPPED | OUTPATIENT
Start: 2018-05-07 | End: 2018-07-15 | Stop reason: SDUPTHER

## 2018-05-22 DIAGNOSIS — M51.36 DDD (DEGENERATIVE DISC DISEASE), LUMBAR: ICD-10-CM

## 2018-05-22 DIAGNOSIS — M15.9 PRIMARY OSTEOARTHRITIS INVOLVING MULTIPLE JOINTS: ICD-10-CM

## 2018-05-22 RX ORDER — HYDROCODONE BITARTRATE AND ACETAMINOPHEN 5; 325 MG/1; MG/1
1 TABLET ORAL EVERY 8 HOURS PRN
Qty: 30 TABLET | Refills: 0 | Status: SHIPPED | OUTPATIENT
Start: 2018-05-22 | End: 2018-06-14 | Stop reason: SDUPTHER

## 2018-06-03 RX ORDER — ATENOLOL 25 MG/1
25 TABLET ORAL DAILY
Qty: 30 TABLET | Refills: 3 | Status: SHIPPED | OUTPATIENT
Start: 2018-06-03 | End: 2018-11-05 | Stop reason: SDUPTHER

## 2018-06-14 DIAGNOSIS — M51.36 DDD (DEGENERATIVE DISC DISEASE), LUMBAR: ICD-10-CM

## 2018-06-14 DIAGNOSIS — M15.9 PRIMARY OSTEOARTHRITIS INVOLVING MULTIPLE JOINTS: ICD-10-CM

## 2018-06-14 RX ORDER — HYDROCODONE BITARTRATE AND ACETAMINOPHEN 5; 325 MG/1; MG/1
1 TABLET ORAL EVERY 8 HOURS PRN
Qty: 60 TABLET | Refills: 0 | Status: SHIPPED | OUTPATIENT
Start: 2018-06-14 | End: 2018-07-10 | Stop reason: SDUPTHER

## 2018-07-01 ENCOUNTER — APPOINTMENT (OUTPATIENT)
Dept: GENERAL RADIOLOGY | Age: 73
End: 2018-07-01
Payer: MEDICARE

## 2018-07-01 ENCOUNTER — HOSPITAL ENCOUNTER (EMERGENCY)
Age: 73
Discharge: HOME | End: 2018-07-01
Attending: EMERGENCY MEDICINE
Payer: MEDICARE

## 2018-07-01 VITALS
RESPIRATION RATE: 16 BRPM | HEART RATE: 56 BPM | TEMPERATURE: 98 F | SYSTOLIC BLOOD PRESSURE: 129 MMHG | OXYGEN SATURATION: 91 % | DIASTOLIC BLOOD PRESSURE: 56 MMHG

## 2018-07-01 DIAGNOSIS — R07.9 CHEST PAIN, UNSPECIFIED TYPE: Primary | ICD-10-CM

## 2018-07-01 LAB
% CKMB: 1.6 % (ref 0–3)
ABSOLUTE EOS #: 0.1 K/UL (ref 0–0.4)
ABSOLUTE IMMATURE GRANULOCYTE: ABNORMAL K/UL (ref 0–0.3)
ABSOLUTE LYMPH #: 1.6 K/UL (ref 1–4.8)
ABSOLUTE MONO #: 0.5 K/UL (ref 0.2–0.8)
ANION GAP SERPL CALCULATED.3IONS-SCNC: 15 MMOL/L (ref 9–17)
BASOPHILS # BLD: 0 % (ref 0–2)
BASOPHILS ABSOLUTE: 0.1 K/UL (ref 0–0.2)
BUN BLDV-MCNC: 13 MG/DL (ref 8–23)
BUN/CREAT BLD: 19 (ref 9–20)
CALCIUM SERPL-MCNC: 9.1 MG/DL (ref 8.6–10.4)
CHLORIDE BLD-SCNC: 103 MMOL/L (ref 98–107)
CK MB: 1.5 NG/ML
CKMB INTERPRETATION: NORMAL
CO2: 24 MMOL/L (ref 20–31)
CREAT SERPL-MCNC: 0.7 MG/DL (ref 0.5–0.9)
DIFFERENTIAL TYPE: ABNORMAL
EKG ATRIAL RATE: 67 BPM
EKG P AXIS: -3 DEGREES
EKG P-R INTERVAL: 120 MS
EKG Q-T INTERVAL: 420 MS
EKG QRS DURATION: 80 MS
EKG QTC CALCULATION (BAZETT): 443 MS
EKG R AXIS: 25 DEGREES
EKG T AXIS: 39 DEGREES
EKG VENTRICULAR RATE: 67 BPM
EOSINOPHILS RELATIVE PERCENT: 1 % (ref 1–4)
GFR AFRICAN AMERICAN: >60 ML/MIN
GFR NON-AFRICAN AMERICAN: >60 ML/MIN
GFR SERPL CREATININE-BSD FRML MDRD: ABNORMAL ML/MIN/{1.73_M2}
GFR SERPL CREATININE-BSD FRML MDRD: ABNORMAL ML/MIN/{1.73_M2}
GLUCOSE BLD-MCNC: 112 MG/DL (ref 70–99)
HCT VFR BLD CALC: 34.6 % (ref 36–46)
HEMOGLOBIN: 11.5 G/DL (ref 12–16)
IMMATURE GRANULOCYTES: ABNORMAL %
INR BLD: 1
LYMPHOCYTES # BLD: 13 % (ref 24–44)
MAGNESIUM: 2 MG/DL (ref 1.6–2.6)
MCH RBC QN AUTO: 31 PG (ref 26–34)
MCHC RBC AUTO-ENTMCNC: 33.1 G/DL (ref 31–37)
MCV RBC AUTO: 93.5 FL (ref 80–100)
MONOCYTES # BLD: 4 % (ref 1–7)
MYOGLOBIN: 35 NG/ML (ref 25–58)
NRBC AUTOMATED: ABNORMAL PER 100 WBC
PARTIAL THROMBOPLASTIN TIME: 29.5 SEC (ref 23–31)
PDW BLD-RTO: 14.4 % (ref 11.5–14.5)
PLATELET # BLD: 418 K/UL (ref 130–400)
PLATELET ESTIMATE: ABNORMAL
PMV BLD AUTO: 7.4 FL (ref 6–12)
POTASSIUM SERPL-SCNC: 4.6 MMOL/L (ref 3.7–5.3)
PROTHROMBIN TIME: 10.4 SEC (ref 9.7–11.6)
RBC # BLD: 3.7 M/UL (ref 4–5.2)
RBC # BLD: ABNORMAL 10*6/UL
SEG NEUTROPHILS: 82 % (ref 36–66)
SEGMENTED NEUTROPHILS ABSOLUTE COUNT: 10.8 K/UL (ref 1.8–7.7)
SODIUM BLD-SCNC: 142 MMOL/L (ref 135–144)
TOTAL CK: 93 U/L (ref 26–192)
TROPONIN INTERP: NORMAL
TROPONIN INTERP: NORMAL
TROPONIN T: <0.03 NG/ML
TROPONIN T: <0.03 NG/ML
WBC # BLD: 13.1 K/UL (ref 3.5–11)
WBC # BLD: ABNORMAL 10*3/UL

## 2018-07-01 PROCEDURE — 71045 X-RAY EXAM CHEST 1 VIEW: CPT

## 2018-07-01 PROCEDURE — 6370000000 HC RX 637 (ALT 250 FOR IP): Performed by: EMERGENCY MEDICINE

## 2018-07-01 PROCEDURE — 2500000003 HC RX 250 WO HCPCS: Performed by: EMERGENCY MEDICINE

## 2018-07-01 PROCEDURE — 82550 ASSAY OF CK (CPK): CPT

## 2018-07-01 PROCEDURE — 85730 THROMBOPLASTIN TIME PARTIAL: CPT

## 2018-07-01 PROCEDURE — 96376 TX/PRO/DX INJ SAME DRUG ADON: CPT

## 2018-07-01 PROCEDURE — S0028 INJECTION, FAMOTIDINE, 20 MG: HCPCS | Performed by: EMERGENCY MEDICINE

## 2018-07-01 PROCEDURE — 99285 EMERGENCY DEPT VISIT HI MDM: CPT

## 2018-07-01 PROCEDURE — 83735 ASSAY OF MAGNESIUM: CPT

## 2018-07-01 PROCEDURE — 80048 BASIC METABOLIC PNL TOTAL CA: CPT

## 2018-07-01 PROCEDURE — 83874 ASSAY OF MYOGLOBIN: CPT

## 2018-07-01 PROCEDURE — 2580000003 HC RX 258: Performed by: EMERGENCY MEDICINE

## 2018-07-01 PROCEDURE — 93005 ELECTROCARDIOGRAM TRACING: CPT

## 2018-07-01 PROCEDURE — 85610 PROTHROMBIN TIME: CPT

## 2018-07-01 PROCEDURE — 96374 THER/PROPH/DIAG INJ IV PUSH: CPT

## 2018-07-01 PROCEDURE — 96375 TX/PRO/DX INJ NEW DRUG ADDON: CPT

## 2018-07-01 PROCEDURE — 85025 COMPLETE CBC W/AUTO DIFF WBC: CPT

## 2018-07-01 PROCEDURE — 82553 CREATINE MB FRACTION: CPT

## 2018-07-01 PROCEDURE — 84484 ASSAY OF TROPONIN QUANT: CPT

## 2018-07-01 PROCEDURE — 6360000002 HC RX W HCPCS: Performed by: EMERGENCY MEDICINE

## 2018-07-01 RX ORDER — METOPROLOL TARTRATE 5 MG/5ML
2.5 INJECTION INTRAVENOUS ONCE
Status: COMPLETED | OUTPATIENT
Start: 2018-07-01 | End: 2018-07-01

## 2018-07-01 RX ORDER — MORPHINE SULFATE 4 MG/ML
4 INJECTION, SOLUTION INTRAMUSCULAR; INTRAVENOUS ONCE
Status: COMPLETED | OUTPATIENT
Start: 2018-07-01 | End: 2018-07-01

## 2018-07-01 RX ORDER — ONDANSETRON 2 MG/ML
4 INJECTION INTRAMUSCULAR; INTRAVENOUS ONCE
Status: COMPLETED | OUTPATIENT
Start: 2018-07-01 | End: 2018-07-01

## 2018-07-01 RX ORDER — ASPIRIN 81 MG/1
243 TABLET, CHEWABLE ORAL ONCE
Status: COMPLETED | OUTPATIENT
Start: 2018-07-01 | End: 2018-07-01

## 2018-07-01 RX ORDER — HYDROCODONE BITARTRATE AND ACETAMINOPHEN 5; 325 MG/1; MG/1
1 TABLET ORAL ONCE
Status: COMPLETED | OUTPATIENT
Start: 2018-07-01 | End: 2018-07-01

## 2018-07-01 RX ORDER — SODIUM CHLORIDE 9 MG/ML
INJECTION, SOLUTION INTRAVENOUS CONTINUOUS
Status: DISCONTINUED | OUTPATIENT
Start: 2018-07-01 | End: 2018-07-01 | Stop reason: HOSPADM

## 2018-07-01 RX ORDER — 0.9 % SODIUM CHLORIDE 0.9 %
500 INTRAVENOUS SOLUTION INTRAVENOUS ONCE
Status: COMPLETED | OUTPATIENT
Start: 2018-07-01 | End: 2018-07-01

## 2018-07-01 RX ADMIN — FAMOTIDINE 20 MG: 10 INJECTION, SOLUTION INTRAVENOUS at 10:51

## 2018-07-01 RX ADMIN — METOPROLOL TARTRATE 2.5 MG: 5 INJECTION, SOLUTION INTRAVENOUS at 09:44

## 2018-07-01 RX ADMIN — METOPROLOL TARTRATE 2.5 MG: 5 INJECTION, SOLUTION INTRAVENOUS at 10:54

## 2018-07-01 RX ADMIN — LIDOCAINE HYDROCHLORIDE: 20 SOLUTION ORAL; TOPICAL at 09:41

## 2018-07-01 RX ADMIN — ONDANSETRON 4 MG: 2 INJECTION INTRAMUSCULAR; INTRAVENOUS at 09:40

## 2018-07-01 RX ADMIN — HYDROCODONE BITARTRATE AND ACETAMINOPHEN 1 TABLET: 5; 325 TABLET ORAL at 11:09

## 2018-07-01 RX ADMIN — ASPIRIN 81 MG 243 MG: 81 TABLET ORAL at 09:38

## 2018-07-01 RX ADMIN — SODIUM CHLORIDE 500 ML: 9 INJECTION, SOLUTION INTRAVENOUS at 10:37

## 2018-07-01 RX ADMIN — MORPHINE SULFATE 4 MG: 4 INJECTION, SOLUTION INTRAMUSCULAR; INTRAVENOUS at 11:39

## 2018-07-01 RX ADMIN — SODIUM CHLORIDE 500 ML: 9 INJECTION, SOLUTION INTRAVENOUS at 09:33

## 2018-07-01 ASSESSMENT — PAIN SCALES - GENERAL
PAINLEVEL_OUTOF10: 8
PAINLEVEL_OUTOF10: 0
PAINLEVEL_OUTOF10: 10
PAINLEVEL_OUTOF10: 9
PAINLEVEL_OUTOF10: 8

## 2018-07-01 ASSESSMENT — HEART SCORE: ECG: 0

## 2018-07-01 ASSESSMENT — PAIN DESCRIPTION - PAIN TYPE: TYPE: ACUTE PAIN

## 2018-07-01 ASSESSMENT — PAIN DESCRIPTION - FREQUENCY: FREQUENCY: CONTINUOUS

## 2018-07-01 ASSESSMENT — PAIN DESCRIPTION - ONSET: ONSET: ON-GOING

## 2018-07-01 ASSESSMENT — PAIN DESCRIPTION - ORIENTATION: ORIENTATION: MID

## 2018-07-01 ASSESSMENT — PAIN DESCRIPTION - LOCATION: LOCATION: ABDOMEN

## 2018-07-01 NOTE — ED PROVIDER NOTES
pain.      HPI    Nursing Notes were reviewed. REVIEW OF SYSTEMS    (2-9 systems for level 4, 10 or more for level 5)     Review of Systems    Except as noted above the remainder of the review of systems was reviewed and negative. PAST MEDICAL HISTORY     Past Medical History:   Diagnosis Date    Arthritis     CAD (coronary artery disease)     Esophageal reflux     Foot drop, right foot     Heart attack     patient does not know when    History of Clostridium difficile infection     treated on 08/12/17    HL (hearing loss)     Hyperlipidemia     Hypertension     Hypothyroidism     Lumbar radiculopathy     Osteoarthritis     Osteopenia     Tremor     hand         SURGICAL HISTORY       Past Surgical History:   Procedure Laterality Date    BLADDER SUSPENSION      CARDIAC CATHETERIZATION  01/2015, 9/2017    COLONOSCOPY  2012    COLONOSCOPY  08/04/2017    PSEUDOMEMBRANOUS COLITIS WITH MUCOSAL ISCHEMIA, C DIFF POS    DILATION AND CURETTAGE OF UTERUS      JOINT REPLACEMENT Right [id number]    hip-complicated with rt drop foot-Dr. Medina JOINT REPLACEMENT Left 2017    hip-Dr. Becca Schmitt    AR COLONOSCOPY W/BIOPSY SINGLE/MULTIPLE N/A 8/4/2017    COLONOSCOPY WITH BIOPSY performed by So Ronquillo MD at [address] EGD TRANSORAL BIOPSY SINGLE/MULTIPLE N/A 8/4/2017    EGD BIOPSY performed by So Ronquillo MD at [address] Left 03/07/2017    UPPER GASTROINTESTINAL ENDOSCOPY  08/04/2017    MILD CHRONIC GASTRITIS          CURRENT MEDICATIONS       Discharge Medication List as of 7/1/2018  2:03 PM      CONTINUE these medications which have NOT CHANGED    Details   levothyroxine (SYNTHROID) 100 MCG tablet TAKE 1 TABLET BY MOUTH DAILY, Disp-30 tablet, R-5Normal      HYDROcodone-acetaminophen (NORCO) 5-325 MG per tablet Take 1 tablet by mouth every 8 hours as needed for Pain for up to 30 days. ., Disp-60 tablet, R-0Normal tablet 243 mg (243 mg Oral Given 7/1/18 0938)   aluminum & magnesium hydroxide-simethicone (MAALOX) 30 mL, lidocaine viscous (XYLOCAINE) 5 mL (GI COCKTAIL) ( Oral Given 7/1/18 0941)   metoprolol (LOPRESSOR) injection 2.5 mg (2.5 mg Intravenous Given 7/1/18 1054)   famotidine (PEPCID) injection 20 mg (20 mg Intravenous Given 7/1/18 1051)   HYDROcodone-acetaminophen (NORCO) 5-325 MG per tablet 1 tablet (1 tablet Oral Given 7/1/18 1109)       New Prescriptions from this visit:    Discharge Medication List as of 7/1/2018  2:03 PM          Follow-up:  MD Syd Conroymerlyn Esteves [address] [facility]  [phone]    Schedule an appointment as soon as possible for a visit       Jey Spencer [address] [facility]  [phone]    Schedule an appointment as soon as possible for a visit       [facility] ED  [address] [facility]  [phone]    As needed, If symptoms worsen        Final Impression:   1.  Chest pain, unspecified type               (Please note that portions of this note were completed with a voice recognition program.  Efforts were made to edit the dictations but occasionally words are mis-transcribed.)    Hortencia Sibley MD (electronically signed)  Attending Emergency Physician            Hortencia Sibley MD  07/01/18 [id number]

## 2018-07-10 ENCOUNTER — OFFICE VISIT (OUTPATIENT)
Dept: FAMILY MEDICINE CLINIC | Age: 73
End: 2018-07-10
Payer: MEDICARE

## 2018-07-10 VITALS
SYSTOLIC BLOOD PRESSURE: 120 MMHG | HEART RATE: 62 BPM | OXYGEN SATURATION: 98 % | WEIGHT: 150 LBS | BODY MASS INDEX: 24.96 KG/M2 | DIASTOLIC BLOOD PRESSURE: 60 MMHG

## 2018-07-10 DIAGNOSIS — M51.36 DDD (DEGENERATIVE DISC DISEASE), LUMBAR: ICD-10-CM

## 2018-07-10 DIAGNOSIS — K21.9 GASTROESOPHAGEAL REFLUX DISEASE WITHOUT ESOPHAGITIS: ICD-10-CM

## 2018-07-10 DIAGNOSIS — I10 ESSENTIAL HYPERTENSION: Primary | ICD-10-CM

## 2018-07-10 DIAGNOSIS — M15.9 PRIMARY OSTEOARTHRITIS INVOLVING MULTIPLE JOINTS: ICD-10-CM

## 2018-07-10 PROCEDURE — 99213 OFFICE O/P EST LOW 20 MIN: CPT | Performed by: FAMILY MEDICINE

## 2018-07-10 RX ORDER — SIMVASTATIN 40 MG
TABLET ORAL
COMMUNITY
Start: 2018-05-27 | End: 2019-02-25 | Stop reason: SDUPTHER

## 2018-07-10 RX ORDER — HYDROCODONE BITARTRATE AND ACETAMINOPHEN 5; 325 MG/1; MG/1
1 TABLET ORAL EVERY 8 HOURS PRN
Qty: 60 TABLET | Refills: 0 | Status: SHIPPED | OUTPATIENT
Start: 2018-07-10 | End: 2018-08-13 | Stop reason: SDUPTHER

## 2018-07-10 ASSESSMENT — ENCOUNTER SYMPTOMS
EYES NEGATIVE: 1
BACK PAIN: 1
ABDOMINAL PAIN: 0
COUGH: 0
SHORTNESS OF BREATH: 0

## 2018-07-10 NOTE — PROGRESS NOTES
Visit Information    Have you changed or started any medications since your last visit including any over-the-counter medicines, vitamins, or herbal medicines? no   Have you stopped taking any of your medications? Is so, why? -  no  Are you having any side effects from any of your medications? - no    Have you seen any other physician or provider since your last visit?  no   Have you had any other diagnostic tests since your last visit? yes -    Have you been seen in the emergency room and/or had an admission in a hospital since we last saw you?  yes -    Have you had your routine dental cleaning in the past 6 months?  no     Do you have an active MyChart account? If no, what is the barrier?   No:     Patient Care Team:  Grabiel Miller MD as PCP - General (Family Medicine)    Medical History Review  Past Medical, Family, and Social History reviewed and does contribute to the patient presenting condition    Health Maintenance   Topic Date Due    Shingles Vaccine (1 of 2 - 2 Dose Series) 03/21/1993    TSH testing  04/14/2018    Flu vaccine (1) 09/01/2018    Potassium monitoring  07/01/2019    Creatinine monitoring  07/01/2019    DTaP/Tdap/Td vaccine (2 - Td) 04/04/2021    Lipid screen  04/14/2022    Colon cancer screen colonoscopy  08/04/2027    DEXA (modify frequency per FRAX score)  Completed    Pneumococcal low/med risk  Completed

## 2018-07-10 NOTE — PROGRESS NOTES
 Esophageal reflux     Foot drop, right foot     Heart attack     patient does not know when    History of Clostridium difficile infection     treated on 08/12/17    HL (hearing loss)     Hyperlipidemia     Hypertension     Hypothyroidism     Lumbar radiculopathy     Osteoarthritis     Osteopenia     Tremor     hand       FAMILY HISTORY    Family History   Problem Relation Age of Onset    Heart Disease Mother     Heart Disease Father     Arthritis Father     Heart Disease Brother        SOCIAL HISTORY    Social History     Social History    Marital status:      Spouse name: N/A    Number of children: N/A    Years of education: N/A     Social History Main Topics    Smoking status: Former Smoker     Years: 30.00     Quit date: 2/10/2007    Smokeless tobacco: Never Used    Alcohol use No    Drug use: No    Sexual activity: Not Asked     Other Topics Concern    None     Social History Narrative    None       SURGICAL HISTORY    Past Surgical History:   Procedure Laterality Date    BLADDER SUSPENSION      CARDIAC CATHETERIZATION  01/2015, 9/2017    COLONOSCOPY  2012    COLONOSCOPY  08/04/2017    PSEUDOMEMBRANOUS COLITIS WITH MUCOSAL ISCHEMIA, C DIFF POS    DILATION AND CURETTAGE OF UTERUS      JOINT REPLACEMENT Right [id number]    hip-complicated with rt drop foot-Dr. Cain JOINT REPLACEMENT Left 2017    hip-Dr. Verner Broody    MA COLONOSCOPY W/BIOPSY SINGLE/MULTIPLE N/A 8/4/2017    COLONOSCOPY WITH BIOPSY performed by dEdi Phan MD at [address] EGD TRANSORAL BIOPSY SINGLE/MULTIPLE N/A 8/4/2017    EGD BIOPSY performed by Eddi Phan MD at [address] Left 03/07/2017    UPPER GASTROINTESTINAL ENDOSCOPY  08/04/2017    MILD CHRONIC GASTRITIS        CURRENT MEDICATIONS    Current Outpatient Prescriptions   Medication Sig Dispense Refill    simvastatin (ZOCOR) 40 MG tablet TAKE ONE TABLET BY MOUTH ONCE A DAY      HYDROcodone-acetaminophen (NORCO) 5-325 MG per tablet Take 1 tablet by mouth every 8 hours as needed for Pain for up to 30 days. . 60 tablet 0    levothyroxine (SYNTHROID) 100 MCG tablet TAKE 1 TABLET BY MOUTH DAILY 30 tablet 5    atenolol (TENORMIN) 25 MG tablet TAKE 1 TABLET BY MOUTH DAILY 30 tablet 3    gabapentin (NEURONTIN) 600 MG tablet Take 2 tablets by mouth 3 times daily for 30 days. . 180 tablet 1    omeprazole (PRILOSEC) 40 MG delayed release capsule TAKE 1 CAPSULE BY MOUTH DAILY 30 capsule 5    diclofenac sodium (VOLTAREN) 1 % GEL Apply 4 g topically every 4 hours as needed for Pain 1 Tube 5    triamcinolone (KENALOG) 0.025 % cream Apply 1 applicator topically 2 times daily Indications: on face Apply Topically      Acetaminophen (TYLENOL ARTHRITIS PAIN PO) Take 1,300 mg by mouth 2 times daily       simvastatin (ZOCOR) 40 MG tablet Take 40 mg by mouth nightly       aspirin 81 MG tablet Take 81 mg by mouth 2 times daily       Ferrous Sulfate (SM IRON PO) Take 65 mg by mouth daily      Multiple Vitamins-Minerals (CENTRUM SILVER PO) Take 1 tablet by mouth daily       No current facility-administered medications for this visit. ALLERGIES    Allergies   Allergen Reactions    Augmentin [Amoxicillin-Pot Clavulanate]        PHYSICAL EXAM   Physical Exam   Constitutional: She is oriented to person, place, and time. She appears well-developed and well-nourished. HENT:   Head: Normocephalic. Eyes: Pupils are equal, round, and reactive to light. Neck: Normal range of motion. Neck supple. Cardiovascular: Normal rate, regular rhythm and normal heart sounds. No murmur heard. Pulmonary/Chest: Effort normal and breath sounds normal. She has no wheezes. She has no rales. Abdominal: Soft. There is no tenderness. There is no rebound and no guarding. Musculoskeletal: Normal range of motion. She exhibits deformity (OA changes. rt foot in brace. using walker for stability).  She exhibits no edema or tenderness. Lymphadenopathy:     She has no cervical adenopathy. Neurological: She is alert and oriented to person, place, and time. Skin: Skin is warm and dry. Psychiatric: She has a normal mood and affect. Her behavior is normal.   Vitals reviewed. Assessment/PLan  1. Essential hypertension  Chronic, stable, continue current medication and/or plan  Cont to monitor and take meds. See cardiologist as planned. 2. Gastroesophageal reflux disease without esophagitis  Cont ppi.     3. DDD (degenerative disc disease), lumbar  Cont pain med as needed. - HYDROcodone-acetaminophen (NORCO) 5-325 MG per tablet; Take 1 tablet by mouth every 8 hours as needed for Pain for up to 30 days. .  Dispense: 60 tablet; Refill: 0    4. Primary osteoarthritis involving multiple joints  Stay as active as possible. - HYDROcodone-acetaminophen (NORCO) 5-325 MG per tablet; Take 1 tablet by mouth every 8 hours as needed for Pain for up to 30 days. .  Dispense: 60 tablet; Refill: 0      Ally received counseling on the following healthy behaviors: nutrition, exercise and medication adherence  Reviewed prior labs and health maintenance. Continue current medications, diet and exercise. Discussed use, benefit, and side effects of prescribed medications. Barriers to medication compliance addressed. Patient given educational materials - see patient instructions. All patient questions answered. Patient voiced understanding. Return in about 6 months (around 1/10/2019) for htn.         Electronically signed by Bernabe López MD on 7/10/18 at 2:48 PM

## 2018-07-26 ENCOUNTER — HOSPITAL ENCOUNTER (OUTPATIENT)
Age: 73
Setting detail: OBSERVATION
Discharge: HOME | End: 2018-07-27
Admitting: INTERNAL MEDICINE
Payer: MEDICARE

## 2018-07-26 DIAGNOSIS — I20.0 UNSTABLE ANGINA PECTORIS (HCC): Primary | ICD-10-CM

## 2018-07-26 DIAGNOSIS — R07.9 CHEST PAIN, UNSPECIFIED TYPE: ICD-10-CM

## 2018-07-26 LAB
ABSOLUTE EOS #: 0 K/UL (ref 0–0.4)
ABSOLUTE IMMATURE GRANULOCYTE: ABNORMAL K/UL (ref 0–0.3)
ABSOLUTE LYMPH #: 1.7 K/UL (ref 1–4.8)
ABSOLUTE MONO #: 0.3 K/UL (ref 0.2–0.8)
ANION GAP SERPL CALCULATED.3IONS-SCNC: 12 MMOL/L (ref 9–17)
BASOPHILS # BLD: 1 % (ref 0–2)
BASOPHILS ABSOLUTE: 0.1 K/UL (ref 0–0.2)
BUN BLDV-MCNC: 17 MG/DL (ref 8–23)
BUN/CREAT BLD: 28 (ref 9–20)
CALCIUM SERPL-MCNC: 9.6 MG/DL (ref 8.6–10.4)
CHLORIDE BLD-SCNC: 99 MMOL/L (ref 98–107)
CO2: 24 MMOL/L (ref 20–31)
CREAT SERPL-MCNC: 0.6 MG/DL (ref 0.5–0.9)
DIFFERENTIAL TYPE: ABNORMAL
EOSINOPHILS RELATIVE PERCENT: 1 % (ref 1–4)
GFR AFRICAN AMERICAN: >60 ML/MIN
GFR NON-AFRICAN AMERICAN: >60 ML/MIN
GFR SERPL CREATININE-BSD FRML MDRD: ABNORMAL ML/MIN/{1.73_M2}
GFR SERPL CREATININE-BSD FRML MDRD: ABNORMAL ML/MIN/{1.73_M2}
GLUCOSE BLD-MCNC: 107 MG/DL (ref 70–99)
HCT VFR BLD CALC: 37.9 % (ref 36–46)
HEMOGLOBIN: 12.1 G/DL (ref 12–16)
IMMATURE GRANULOCYTES: ABNORMAL %
LYMPHOCYTES # BLD: 18 % (ref 24–44)
MCH RBC QN AUTO: 30 PG (ref 26–34)
MCHC RBC AUTO-ENTMCNC: 32 G/DL (ref 31–37)
MCV RBC AUTO: 93.7 FL (ref 80–100)
MONOCYTES # BLD: 3 % (ref 1–7)
MYOGLOBIN: 29 NG/ML (ref 25–58)
NRBC AUTOMATED: ABNORMAL PER 100 WBC
PDW BLD-RTO: 14.7 % (ref 11.5–14.5)
PLATELET # BLD: 489 K/UL (ref 130–400)
PLATELET ESTIMATE: ABNORMAL
PMV BLD AUTO: 7.5 FL (ref 6–12)
POTASSIUM SERPL-SCNC: 3.9 MMOL/L (ref 3.7–5.3)
RBC # BLD: 4.05 M/UL (ref 4–5.2)
RBC # BLD: ABNORMAL 10*6/UL
SEG NEUTROPHILS: 77 % (ref 36–66)
SEGMENTED NEUTROPHILS ABSOLUTE COUNT: 7.2 K/UL (ref 1.8–7.7)
SODIUM BLD-SCNC: 135 MMOL/L (ref 135–144)
TROPONIN INTERP: NORMAL
TROPONIN T: <0.03 NG/ML
WBC # BLD: 9.3 K/UL (ref 3.5–11)
WBC # BLD: ABNORMAL 10*3/UL

## 2018-07-26 PROCEDURE — 96374 THER/PROPH/DIAG INJ IV PUSH: CPT

## 2018-07-26 PROCEDURE — 99284 EMERGENCY DEPT VISIT MOD MDM: CPT

## 2018-07-26 PROCEDURE — 6370000000 HC RX 637 (ALT 250 FOR IP): Performed by: NURSE PRACTITIONER

## 2018-07-26 PROCEDURE — G0378 HOSPITAL OBSERVATION PER HR: HCPCS

## 2018-07-26 PROCEDURE — S0028 INJECTION, FAMOTIDINE, 20 MG: HCPCS | Performed by: NURSE PRACTITIONER

## 2018-07-26 PROCEDURE — 6360000002 HC RX W HCPCS: Performed by: NURSE PRACTITIONER

## 2018-07-26 PROCEDURE — 96375 TX/PRO/DX INJ NEW DRUG ADDON: CPT

## 2018-07-26 PROCEDURE — 2060000000 HC ICU INTERMEDIATE R&B

## 2018-07-26 PROCEDURE — 83874 ASSAY OF MYOGLOBIN: CPT

## 2018-07-26 PROCEDURE — 6370000000 HC RX 637 (ALT 250 FOR IP)

## 2018-07-26 PROCEDURE — 85025 COMPLETE CBC W/AUTO DIFF WBC: CPT

## 2018-07-26 PROCEDURE — 93005 ELECTROCARDIOGRAM TRACING: CPT

## 2018-07-26 PROCEDURE — 84484 ASSAY OF TROPONIN QUANT: CPT

## 2018-07-26 PROCEDURE — 2500000003 HC RX 250 WO HCPCS: Performed by: NURSE PRACTITIONER

## 2018-07-26 PROCEDURE — 80048 BASIC METABOLIC PNL TOTAL CA: CPT

## 2018-07-26 PROCEDURE — 6360000002 HC RX W HCPCS

## 2018-07-26 RX ORDER — MORPHINE SULFATE 4 MG/ML
4 INJECTION, SOLUTION INTRAMUSCULAR; INTRAVENOUS ONCE
Status: COMPLETED | OUTPATIENT
Start: 2018-07-26 | End: 2018-07-26

## 2018-07-26 RX ORDER — ASPIRIN 325 MG
325 TABLET ORAL DAILY
Status: ON HOLD | COMMUNITY
End: 2018-07-27 | Stop reason: ALTCHOICE

## 2018-07-26 RX ORDER — ONDANSETRON 2 MG/ML
4 INJECTION INTRAMUSCULAR; INTRAVENOUS ONCE
Status: COMPLETED | OUTPATIENT
Start: 2018-07-26 | End: 2018-07-26

## 2018-07-26 RX ORDER — ASPIRIN 81 MG/1
324 TABLET, CHEWABLE ORAL ONCE
Status: COMPLETED | OUTPATIENT
Start: 2018-07-26 | End: 2018-07-26

## 2018-07-26 RX ADMIN — ONDANSETRON HYDROCHLORIDE 4 MG: 2 INJECTION, SOLUTION INTRAMUSCULAR; INTRAVENOUS at 22:42

## 2018-07-26 RX ADMIN — MORPHINE SULFATE 4 MG: 4 INJECTION INTRAVENOUS at 20:51

## 2018-07-26 RX ADMIN — FAMOTIDINE 20 MG: 10 INJECTION, SOLUTION INTRAVENOUS at 20:51

## 2018-07-26 RX ADMIN — LIDOCAINE HYDROCHLORIDE: 20 SOLUTION ORAL; TOPICAL at 20:51

## 2018-07-26 RX ADMIN — ASPIRIN 81 MG 324 MG: 81 TABLET ORAL at 22:42

## 2018-07-26 ASSESSMENT — ENCOUNTER SYMPTOMS
CONSTIPATION: 0
SHORTNESS OF BREATH: 0
NAUSEA: 0
SORE THROAT: 0
COUGH: 0
ABDOMINAL PAIN: 0
RHINORRHEA: 0
WHEEZING: 0
DIARRHEA: 0
VOMITING: 0
SINUS PRESSURE: 0
COLOR CHANGE: 0

## 2018-07-26 ASSESSMENT — PAIN DESCRIPTION - LOCATION: LOCATION: CHEST

## 2018-07-26 ASSESSMENT — PAIN SCALES - GENERAL
PAINLEVEL_OUTOF10: 9
PAINLEVEL_OUTOF10: 5
PAINLEVEL_OUTOF10: 6

## 2018-07-26 ASSESSMENT — PAIN DESCRIPTION - DESCRIPTORS: DESCRIPTORS: BURNING

## 2018-07-26 ASSESSMENT — PAIN DESCRIPTION - PAIN TYPE: TYPE: ACUTE PAIN

## 2018-07-27 ENCOUNTER — APPOINTMENT (OUTPATIENT)
Dept: NUCLEAR MEDICINE | Age: 73
End: 2018-07-27
Payer: MEDICARE

## 2018-07-27 VITALS
OXYGEN SATURATION: 94 % | HEART RATE: 63 BPM | SYSTOLIC BLOOD PRESSURE: 136 MMHG | TEMPERATURE: 97.5 F | DIASTOLIC BLOOD PRESSURE: 61 MMHG | HEIGHT: 65 IN | RESPIRATION RATE: 14 BRPM | WEIGHT: 155.19 LBS | BODY MASS INDEX: 25.85 KG/M2

## 2018-07-27 PROBLEM — R07.89 ATYPICAL CHEST PAIN: Status: ACTIVE | Noted: 2018-07-27

## 2018-07-27 LAB
ALBUMIN SERPL-MCNC: 3.7 G/DL (ref 3.5–5.2)
ALBUMIN/GLOBULIN RATIO: ABNORMAL (ref 1–2.5)
ALP BLD-CCNC: 154 U/L (ref 35–104)
ALT SERPL-CCNC: 91 U/L (ref 5–33)
ANION GAP SERPL CALCULATED.3IONS-SCNC: 11 MMOL/L (ref 9–17)
AST SERPL-CCNC: 105 U/L
BILIRUB SERPL-MCNC: 0.68 MG/DL (ref 0.3–1.2)
BUN BLDV-MCNC: 14 MG/DL (ref 8–23)
BUN/CREAT BLD: 21 (ref 9–20)
CALCIUM SERPL-MCNC: 9.4 MG/DL (ref 8.6–10.4)
CHLORIDE BLD-SCNC: 102 MMOL/L (ref 98–107)
CHOLESTEROL/HDL RATIO: 2.7
CHOLESTEROL: 153 MG/DL
CO2: 28 MMOL/L (ref 20–31)
CREAT SERPL-MCNC: 0.66 MG/DL (ref 0.5–0.9)
EKG ATRIAL RATE: 58 BPM
EKG ATRIAL RATE: 62 BPM
EKG P AXIS: 54 DEGREES
EKG P AXIS: 56 DEGREES
EKG P-R INTERVAL: 122 MS
EKG P-R INTERVAL: 128 MS
EKG Q-T INTERVAL: 454 MS
EKG Q-T INTERVAL: 456 MS
EKG QRS DURATION: 84 MS
EKG QRS DURATION: 90 MS
EKG QTC CALCULATION (BAZETT): 447 MS
EKG QTC CALCULATION (BAZETT): 460 MS
EKG R AXIS: 28 DEGREES
EKG R AXIS: 48 DEGREES
EKG T AXIS: 62 DEGREES
EKG T AXIS: 62 DEGREES
EKG VENTRICULAR RATE: 58 BPM
EKG VENTRICULAR RATE: 62 BPM
GFR AFRICAN AMERICAN: >60 ML/MIN
GFR NON-AFRICAN AMERICAN: >60 ML/MIN
GFR SERPL CREATININE-BSD FRML MDRD: ABNORMAL ML/MIN/{1.73_M2}
GFR SERPL CREATININE-BSD FRML MDRD: ABNORMAL ML/MIN/{1.73_M2}
GLUCOSE BLD-MCNC: 96 MG/DL (ref 70–99)
HCT VFR BLD CALC: 35.7 % (ref 36–46)
HDLC SERPL-MCNC: 56 MG/DL
HEMOGLOBIN: 11.7 G/DL (ref 12–16)
LDL CHOLESTEROL: 79 MG/DL (ref 0–130)
LV EF: 41 %
LV EF: 53 %
LVEF MODALITY: NORMAL
LVEF MODALITY: NORMAL
MAGNESIUM: 2.1 MG/DL (ref 1.6–2.6)
MCH RBC QN AUTO: 30.3 PG (ref 26–34)
MCHC RBC AUTO-ENTMCNC: 32.8 G/DL (ref 31–37)
MCV RBC AUTO: 92.6 FL (ref 80–100)
MYOGLOBIN: 39 NG/ML (ref 25–58)
MYOGLOBIN: 49 NG/ML (ref 25–58)
MYOGLOBIN: 56 NG/ML (ref 25–58)
NRBC AUTOMATED: ABNORMAL PER 100 WBC
PDW BLD-RTO: 14.5 % (ref 11.5–14.5)
PLATELET # BLD: 421 K/UL (ref 130–400)
PMV BLD AUTO: 7.1 FL (ref 6–12)
POTASSIUM SERPL-SCNC: 4.2 MMOL/L (ref 3.7–5.3)
RBC # BLD: 3.85 M/UL (ref 4–5.2)
SODIUM BLD-SCNC: 141 MMOL/L (ref 135–144)
TOTAL PROTEIN: 7.4 G/DL (ref 6.4–8.3)
TRIGL SERPL-MCNC: 89 MG/DL
TROPONIN INTERP: NORMAL
TROPONIN T: <0.03 NG/ML
VLDLC SERPL CALC-MCNC: NORMAL MG/DL (ref 1–30)
WBC # BLD: 9.8 K/UL (ref 3.5–11)

## 2018-07-27 PROCEDURE — 6360000002 HC RX W HCPCS: Performed by: INTERNAL MEDICINE

## 2018-07-27 PROCEDURE — 2580000003 HC RX 258: Performed by: INTERNAL MEDICINE

## 2018-07-27 PROCEDURE — 80053 COMPREHEN METABOLIC PANEL: CPT

## 2018-07-27 PROCEDURE — 78452 HT MUSCLE IMAGE SPECT MULT: CPT

## 2018-07-27 PROCEDURE — G0378 HOSPITAL OBSERVATION PER HR: HCPCS

## 2018-07-27 PROCEDURE — 85027 COMPLETE CBC AUTOMATED: CPT

## 2018-07-27 PROCEDURE — 83874 ASSAY OF MYOGLOBIN: CPT

## 2018-07-27 PROCEDURE — 36415 COLL VENOUS BLD VENIPUNCTURE: CPT

## 2018-07-27 PROCEDURE — 93005 ELECTROCARDIOGRAM TRACING: CPT

## 2018-07-27 PROCEDURE — 6370000000 HC RX 637 (ALT 250 FOR IP): Performed by: NURSE PRACTITIONER

## 2018-07-27 PROCEDURE — 96375 TX/PRO/DX INJ NEW DRUG ADDON: CPT

## 2018-07-27 PROCEDURE — 83735 ASSAY OF MAGNESIUM: CPT

## 2018-07-27 PROCEDURE — 3430000000 HC RX DIAGNOSTIC RADIOPHARMACEUTICAL: Performed by: INTERNAL MEDICINE

## 2018-07-27 PROCEDURE — 99219 PR INITIAL OBSERVATION CARE/DAY 50 MINUTES: CPT | Performed by: INTERNAL MEDICINE

## 2018-07-27 PROCEDURE — 80061 LIPID PANEL: CPT

## 2018-07-27 PROCEDURE — 93017 CV STRESS TEST TRACING ONLY: CPT

## 2018-07-27 PROCEDURE — 6360000002 HC RX W HCPCS: Performed by: NURSE PRACTITIONER

## 2018-07-27 PROCEDURE — A9500 TC99M SESTAMIBI: HCPCS | Performed by: INTERNAL MEDICINE

## 2018-07-27 PROCEDURE — 93306 TTE W/DOPPLER COMPLETE: CPT

## 2018-07-27 PROCEDURE — 84484 ASSAY OF TROPONIN QUANT: CPT

## 2018-07-27 RX ORDER — MORPHINE SULFATE 2 MG/ML
2 INJECTION, SOLUTION INTRAMUSCULAR; INTRAVENOUS
Status: DISCONTINUED | OUTPATIENT
Start: 2018-07-27 | End: 2018-07-27 | Stop reason: HOSPADM

## 2018-07-27 RX ORDER — HYDROCODONE BITARTRATE AND ACETAMINOPHEN 5; 325 MG/1; MG/1
1 TABLET ORAL EVERY 4 HOURS PRN
Status: DISCONTINUED | OUTPATIENT
Start: 2018-07-27 | End: 2018-07-27 | Stop reason: HOSPADM

## 2018-07-27 RX ORDER — NITROGLYCERIN 0.4 MG/1
0.4 TABLET SUBLINGUAL EVERY 5 MIN PRN
Status: DISCONTINUED | OUTPATIENT
Start: 2018-07-27 | End: 2018-07-27 | Stop reason: HOSPADM

## 2018-07-27 RX ORDER — HYDRALAZINE HYDROCHLORIDE 20 MG/ML
10 INJECTION INTRAMUSCULAR; INTRAVENOUS EVERY 6 HOURS PRN
Status: DISCONTINUED | OUTPATIENT
Start: 2018-07-27 | End: 2018-07-27 | Stop reason: HOSPADM

## 2018-07-27 RX ORDER — ONDANSETRON 2 MG/ML
4 INJECTION INTRAMUSCULAR; INTRAVENOUS EVERY 6 HOURS PRN
Status: DISCONTINUED | OUTPATIENT
Start: 2018-07-27 | End: 2018-07-27 | Stop reason: SDUPTHER

## 2018-07-27 RX ORDER — SODIUM CHLORIDE 0.9 % (FLUSH) 0.9 %
10 SYRINGE (ML) INJECTION EVERY 12 HOURS SCHEDULED
Status: DISCONTINUED | OUTPATIENT
Start: 2018-07-27 | End: 2018-07-27 | Stop reason: HOSPADM

## 2018-07-27 RX ORDER — MAGNESIUM SULFATE 1 G/100ML
1 INJECTION INTRAVENOUS PRN
Status: DISCONTINUED | OUTPATIENT
Start: 2018-07-27 | End: 2018-07-27 | Stop reason: HOSPADM

## 2018-07-27 RX ORDER — SODIUM CHLORIDE 9 MG/ML
INJECTION, SOLUTION INTRAVENOUS ONCE
Status: CANCELLED | OUTPATIENT
Start: 2018-07-27 | End: 2018-07-27

## 2018-07-27 RX ORDER — ACETAMINOPHEN 325 MG/1
650 TABLET ORAL EVERY 4 HOURS PRN
Status: DISCONTINUED | OUTPATIENT
Start: 2018-07-27 | End: 2018-07-27 | Stop reason: HOSPADM

## 2018-07-27 RX ORDER — MORPHINE SULFATE 4 MG/ML
4 INJECTION, SOLUTION INTRAMUSCULAR; INTRAVENOUS
Status: DISCONTINUED | OUTPATIENT
Start: 2018-07-27 | End: 2018-07-27 | Stop reason: HOSPADM

## 2018-07-27 RX ORDER — LEVOTHYROXINE SODIUM 0.1 MG/1
100 TABLET ORAL DAILY
Status: DISCONTINUED | OUTPATIENT
Start: 2018-07-27 | End: 2018-07-27 | Stop reason: HOSPADM

## 2018-07-27 RX ORDER — SIMVASTATIN 40 MG
40 TABLET ORAL DAILY
Status: DISCONTINUED | OUTPATIENT
Start: 2018-07-27 | End: 2018-07-27 | Stop reason: HOSPADM

## 2018-07-27 RX ORDER — BISACODYL 10 MG
10 SUPPOSITORY, RECTAL RECTAL DAILY PRN
Status: DISCONTINUED | OUTPATIENT
Start: 2018-07-27 | End: 2018-07-27 | Stop reason: HOSPADM

## 2018-07-27 RX ORDER — NICOTINE 21 MG/24HR
1 PATCH, TRANSDERMAL 24 HOURS TRANSDERMAL DAILY
Status: DISCONTINUED | OUTPATIENT
Start: 2018-07-27 | End: 2018-07-27 | Stop reason: HOSPADM

## 2018-07-27 RX ORDER — AMINOPHYLLINE DIHYDRATE 25 MG/ML
100 INJECTION, SOLUTION INTRAVENOUS
Status: DISCONTINUED | OUTPATIENT
Start: 2018-07-27 | End: 2018-07-27 | Stop reason: HOSPADM

## 2018-07-27 RX ORDER — METOPROLOL TARTRATE 5 MG/5ML
2.5 INJECTION INTRAVENOUS PRN
Status: ACTIVE | OUTPATIENT
Start: 2018-07-27 | End: 2018-07-27

## 2018-07-27 RX ORDER — SODIUM CHLORIDE 0.9 % (FLUSH) 0.9 %
10 SYRINGE (ML) INJECTION PRN
Status: DISCONTINUED | OUTPATIENT
Start: 2018-07-27 | End: 2018-07-27 | Stop reason: HOSPADM

## 2018-07-27 RX ORDER — ATENOLOL 25 MG/1
25 TABLET ORAL DAILY
Status: DISCONTINUED | OUTPATIENT
Start: 2018-07-27 | End: 2018-07-27 | Stop reason: HOSPADM

## 2018-07-27 RX ORDER — POTASSIUM CHLORIDE 7.45 MG/ML
10 INJECTION INTRAVENOUS PRN
Status: DISCONTINUED | OUTPATIENT
Start: 2018-07-27 | End: 2018-07-27 | Stop reason: SDUPTHER

## 2018-07-27 RX ORDER — NITROGLYCERIN 0.4 MG/1
0.4 TABLET SUBLINGUAL EVERY 5 MIN PRN
Status: ACTIVE | OUTPATIENT
Start: 2018-07-27 | End: 2018-07-27

## 2018-07-27 RX ORDER — SODIUM CHLORIDE 0.9 % (FLUSH) 0.9 %
10 SYRINGE (ML) INJECTION PRN
Status: ACTIVE | OUTPATIENT
Start: 2018-07-27 | End: 2018-07-27

## 2018-07-27 RX ORDER — ONDANSETRON 4 MG/1
4 TABLET, ORALLY DISINTEGRATING ORAL EVERY 6 HOURS PRN
Status: DISCONTINUED | OUTPATIENT
Start: 2018-07-27 | End: 2018-07-27 | Stop reason: HOSPADM

## 2018-07-27 RX ORDER — DOCUSATE SODIUM 100 MG/1
100 CAPSULE, LIQUID FILLED ORAL 2 TIMES DAILY
Status: DISCONTINUED | OUTPATIENT
Start: 2018-07-27 | End: 2018-07-27 | Stop reason: HOSPADM

## 2018-07-27 RX ORDER — PANTOPRAZOLE SODIUM 40 MG/1
40 TABLET, DELAYED RELEASE ORAL
Status: DISCONTINUED | OUTPATIENT
Start: 2018-07-27 | End: 2018-07-27 | Stop reason: HOSPADM

## 2018-07-27 RX ORDER — POTASSIUM CHLORIDE 7.45 MG/ML
10 INJECTION INTRAVENOUS PRN
Status: DISCONTINUED | OUTPATIENT
Start: 2018-07-27 | End: 2018-07-27 | Stop reason: HOSPADM

## 2018-07-27 RX ORDER — ONDANSETRON 2 MG/ML
4 INJECTION INTRAMUSCULAR; INTRAVENOUS EVERY 6 HOURS PRN
Status: DISCONTINUED | OUTPATIENT
Start: 2018-07-27 | End: 2018-07-27 | Stop reason: HOSPADM

## 2018-07-27 RX ORDER — HYDRALAZINE HYDROCHLORIDE 20 MG/ML
20 INJECTION INTRAMUSCULAR; INTRAVENOUS EVERY 6 HOURS PRN
Status: DISCONTINUED | OUTPATIENT
Start: 2018-07-27 | End: 2018-07-27 | Stop reason: HOSPADM

## 2018-07-27 RX ORDER — POTASSIUM CHLORIDE 20MEQ/15ML
40 LIQUID (ML) ORAL PRN
Status: DISCONTINUED | OUTPATIENT
Start: 2018-07-27 | End: 2018-07-27 | Stop reason: HOSPADM

## 2018-07-27 RX ORDER — GABAPENTIN 300 MG/1
600 CAPSULE ORAL 3 TIMES DAILY
Status: DISCONTINUED | OUTPATIENT
Start: 2018-07-27 | End: 2018-07-27 | Stop reason: HOSPADM

## 2018-07-27 RX ORDER — POTASSIUM CHLORIDE 20 MEQ/1
40 TABLET, EXTENDED RELEASE ORAL PRN
Status: DISCONTINUED | OUTPATIENT
Start: 2018-07-27 | End: 2018-07-27 | Stop reason: HOSPADM

## 2018-07-27 RX ORDER — ASPIRIN 325 MG
325 TABLET ORAL DAILY
Status: DISCONTINUED | OUTPATIENT
Start: 2018-07-27 | End: 2018-07-27 | Stop reason: HOSPADM

## 2018-07-27 RX ADMIN — DOCUSATE SODIUM 100 MG: 100 CAPSULE, LIQUID FILLED ORAL at 01:57

## 2018-07-27 RX ADMIN — PANTOPRAZOLE SODIUM 40 MG: 40 TABLET, DELAYED RELEASE ORAL at 06:35

## 2018-07-27 RX ADMIN — HYDRALAZINE HYDROCHLORIDE 10 MG: 20 INJECTION INTRAMUSCULAR; INTRAVENOUS at 01:58

## 2018-07-27 RX ADMIN — Medication 10 ML: at 08:16

## 2018-07-27 RX ADMIN — TETRAKIS(2-METHOXYISOBUTYLISOCYANIDE)COPPER(I) TETRAFLUOROBORATE 42.2 MILLICURIE: 1 INJECTION, POWDER, LYOPHILIZED, FOR SOLUTION INTRAVENOUS at 12:15

## 2018-07-27 RX ADMIN — ASPIRIN 325 MG: 325 TABLET, COATED ORAL at 09:17

## 2018-07-27 RX ADMIN — GABAPENTIN 600 MG: 300 CAPSULE ORAL at 09:17

## 2018-07-27 RX ADMIN — LEVOTHYROXINE SODIUM 100 MCG: 100 TABLET ORAL at 06:35

## 2018-07-27 RX ADMIN — SIMVASTATIN 40 MG: 40 TABLET, FILM COATED ORAL at 09:17

## 2018-07-27 RX ADMIN — ATENOLOL 25 MG: 25 TABLET ORAL at 09:17

## 2018-07-27 RX ADMIN — TETRAKIS(2-METHOXYISOBUTYLISOCYANIDE)COPPER(I) TETRAFLUOROBORATE 21.5 MILLICURIE: 1 INJECTION, POWDER, LYOPHILIZED, FOR SOLUTION INTRAVENOUS at 08:17

## 2018-07-27 RX ADMIN — GABAPENTIN 600 MG: 300 CAPSULE ORAL at 01:57

## 2018-07-27 RX ADMIN — REGADENOSON 0.4 MG: 0.08 INJECTION, SOLUTION INTRAVENOUS at 08:16

## 2018-07-27 ASSESSMENT — PAIN SCALES - GENERAL
PAINLEVEL_OUTOF10: 0
PAINLEVEL_OUTOF10: 3

## 2018-07-27 ASSESSMENT — PAIN DESCRIPTION - LOCATION: LOCATION: CHEST

## 2018-07-27 NOTE — CONSULTS
PO) Take 1 tablet by mouth daily    Historical Provider, MD      Scheduled Meds:   aspirin  325 mg Oral Daily    atenolol  25 mg Oral Daily    gabapentin  600 mg Oral TID    levothyroxine  100 mcg Oral Daily    pantoprazole  40 mg Oral QAM AC    simvastatin  40 mg Oral Daily    sodium chloride flush  10 mL Intravenous 2 times per day    docusate sodium  100 mg Oral BID    enoxaparin  40 mg Subcutaneous Daily    nicotine  1 patch Transdermal Daily     Continuous Infusions:  PRN Meds:.sodium chloride flush, potassium chloride **OR** potassium chloride **OR** potassium chloride, magnesium sulfate, acetaminophen, HYDROcodone 5 mg - acetaminophen, morphine **OR** morphine, magnesium hydroxide, bisacodyl, nitroGLYCERIN, ondansetron **OR** ondansetron, hydrALAZINE, hydrALAZINE, aminophylline, sodium chloride flush, metoprolol, nitroGLYCERIN, technetium sestamibi    Allergies:  Augmentin [amoxicillin-pot clavulanate]     Review of Systems:   All 12 point review of symptoms completed. Pertinent positives identified in the HPI, all other review of symptoms negative as below. · Constitutional: there has been no unanticipated weight loss. There's been no change in energy level, sleep pattern, or activity level. · Eyes: No visual changes or diplopia. No scleral icterus. · ENT: No Headaches, hearing loss or vertigo. No mouth sores or sore throat. · Cardiovascular: + Chest pain. No loss of consciousness. No hemoptysis, pleuritic pain, or phlebitis. · Respiratory: No cough or wheezing, no sputum production. No hematemesis. · Gastrointestinal: No abdominal pain, appetite loss, blood in stools. No change in bowel or bladder habits. · Genitourinary: No dysuria, trouble voiding, or hematuria. · Musculoskeletal:  No gait disturbance, weakness or joint complaints. · Integumentary: No rash or pruritis. · Neurological: No headache, diplopia, change in muscle strength, numbness or tingling.  No change in gait, BILITOT 0.68 07/27/2018    ALKPHOS 154 07/27/2018     07/27/2018    ALT 91 07/27/2018     Lab Results   Component Value Date    CKTOTAL 93 07/01/2018    CKTOTAL 118 06/20/2012    CKTOTAL 102 03/20/2012    CKMB 1.5 07/01/2018    TROPONINT <0.03 07/27/2018    TROPONINT <0.03 07/27/2018    TROPONINT <0.03 07/27/2018       EKG:  I have reviewed EKG with the following interpretation:  Impression:  Sinus rhythm, nonspecific ST depression in the inferior as well as lateral leads    Assessment  1. Chest discomfort with both typical and atypical features  2. Coronary artery disease with known significant but stable disease in the obtuse marginal.  3. Essential hypertension  4. Hyperlipidemia  5. History of GI bleed        Plan:    I had the opportunity to review the clinical symptoms and presentation of Basilio Warner. Since admission her enzymes have remained negative. His symptoms have resolved. She does have underlying coronary artery disease which is most significantly involving obtuse marginal. This is not a new finding and has been stable for years. A stress test was scheduled for this morning and was reviewed. She had significant ST changes that would render her EKG portion to be positive, the nuclear portion is still pending. Keeping in mind that she had an abnormal stress test showing anterior ischemia at [facility] in the past after which cardiac catheterization revealed no significant disease. I do suspect however her symptoms are more of a GI etiology given the fact that her symptoms resolved after a GI cocktail. As long as her stress test is negative IS similar to that observed in 2015 she could be discharged home and follow as outpatient. Thank you for allowing to us to participate in the care or Basilio Warner. Further evaluation will be based upon the patient's clinical course and testing results. All questions and concerns were addressed to the patient/family.  Alternatives to my

## 2018-07-27 NOTE — FLOWSHEET NOTE
Patient sleeping; no family present.  prayed for patient; left note of support on tray table. Spiritual Care will follow as needed.      07/27/18 1249   Encounter Summary   Services provided to: Patient   Referral/Consult From: William   Continue Visiting (7/27/18 sleeping)   Complexity of Encounter Low   Length of Encounter 15 minutes   Routine   Type Initial   Assessment Sleeping   Intervention Prayer  (Left note)   Outcome Did not respond

## 2018-07-27 NOTE — PROGRESS NOTES
Stress test reviewed. Defect noted as similar to that observed in 2015 on a nuclear stress test at [facility].  Left ventricular function was noted to be at 41% ejection fraction, however an echocardiogram performed today also and also did reveal normal left ventricular function with mild septal wall motion maladies. This is similar to prior echocardiograms obtained in our office that the document similar findings. At this stage she is pain-free. She currently discharged home and follow as outpatient.     Charles Wheeler MD 7/27/2018 2:32 PM

## 2018-07-27 NOTE — ED PROVIDER NOTES
is warm and dry. No rash noted. Psychiatric: She has a normal mood and affect. Vitals reviewed. DIAGNOSTIC RESULTS     EKG: All EKG's are interpreted by the Emergency Department Physician who either signs or Co-signs this chart in the absence of a cardiologist.    Normal sinus rhythm no ST elevation    RADIOLOGY:   Non-plain film images such as CT, Ultrasound and MRI are read by the radiologist. Plain radiographic images are visualized and preliminarily interpreted by the emergency physician with the below findings:      No orders to display           LABS:  Labs Reviewed   [address] [facility]   TROP/MYOGLOBIN       All other labs were within normal range or not returned as of this dictation. EMERGENCY DEPARTMENT COURSE and DIFFERENTIAL DIAGNOSIS/MDM:   Vitals:    Vitals:    07/26/18 1933   BP: (!) 161/69   Pulse: 59   Resp: 18   Temp: 97.7 °F (36.5 °C)   TempSrc: Oral   SpO2: 97%   Weight: 155 lb 3 oz (70.4 kg)   Height: 5' 5\" (1.651 m)       Medical Decision Making: case was turned over to Dr Tammie Khan. See his dictation for ER disposition. FINAL IMPRESSION    No diagnosis found. DISPOSITION/PLAN   DISPOSITION        PATIENT REFERRED TO:   No follow-up provider specified.     DISCHARGE MEDICATIONS:     New Prescriptions    No medications on file           (Please note that portions of this note were completed with a voice recognition program.  Efforts were made to edit the dictations but occasionally words are mis-transcribed.)    [id number] [facility] FRANK, APRN - CNP  Certified Nurse Practitioner            SHANELLE Carl - [facility]  07/26/18 2872

## 2018-07-27 NOTE — H&P
Osteopenia     Psychiatric problem     Tremor     hand        Past Surgical History:     Past Surgical History:   Procedure Laterality Date    BLADDER SUSPENSION      CARDIAC CATHETERIZATION  01/2015, 9/2017    COLONOSCOPY  2012    COLONOSCOPY  08/04/2017    PSEUDOMEMBRANOUS COLITIS WITH MUCOSAL ISCHEMIA, C DIFF POS    DILATION AND CURETTAGE OF UTERUS      JOINT REPLACEMENT Right [id number]    hip-complicated with rt drop foot-Dr. Snider JOINT REPLACEMENT Left 2017    hip-Dr. Radhames Garcia    SC COLONOSCOPY W/BIOPSY SINGLE/MULTIPLE N/A 8/4/2017    COLONOSCOPY WITH BIOPSY performed by Cl Borrero MD at [address] EGD TRANSORAL BIOPSY SINGLE/MULTIPLE N/A 8/4/2017    EGD BIOPSY performed by Cl Borrero MD at [address] Left 03/07/2017    UPPER GASTROINTESTINAL ENDOSCOPY  08/04/2017    MILD CHRONIC GASTRITIS         Medications Prior to Admission:     Prior to Admission medications    Medication Sig Start Date End Date Taking? Authorizing Provider   Omega-3 Fatty Acids (FISH OIL) 600 MG CAPS Take 1 tablet by mouth daily   Yes Historical Provider, MD   HYDROcodone-acetaminophen (NORCO) 5-325 MG per tablet Take 1 tablet by mouth every 8 hours as needed for Pain for up to 30 days. . 7/10/18 8/9/18 Yes Nomi Gilmore MD   omeprazole (PRILOSEC) 40 MG delayed release capsule TAKE 1 CAPSULE BY MOUTH DAILY 4/15/18  Yes Nomi Gilmore MD   gabapentin (NEURONTIN) 600 MG tablet TAKE 2 TABLETS BY MOUTH THREE TIMES DAILY 7/16/18 8/15/18  Nomi Gilmore MD   simvastatin (ZOCOR) 40 MG tablet TAKE ONE TABLET BY MOUTH ONCE A DAY 5/27/18   Historical Provider, MD   levothyroxine (SYNTHROID) 100 MCG tablet TAKE 1 TABLET BY MOUTH DAILY 6/18/18   Nomi Gilmore MD   atenolol (TENORMIN) 25 MG tablet TAKE 1 TABLET BY MOUTH DAILY 6/3/18   Mara Yoon APRN - CNP   diclofenac sodium (VOLTAREN) 1 % GEL Apply 4 g topically every 4 hours as needed for Pain 3/28/18   Yesy Cardenas MD   triamcinolone (KENALOG) 0.025 % cream Apply 1 applicator topically 2 times daily Indications: on face Apply Topically    Historical Provider, MD   Acetaminophen (TYLENOL ARTHRITIS PAIN PO) Take 1,300 mg by mouth 2 times daily     Historical Provider, MD   aspirin 81 MG tablet Take 81 mg by mouth 2 times daily     Historical Provider, MD   Multiple Vitamins-Minerals (CENTRUM SILVER PO) Take 1 tablet by mouth daily    Historical Provider, MD        Allergies:     Augmentin [amoxicillin-pot clavulanate]    Social History:     Tobacco:    reports that she quit smoking about 11 years ago. She quit after 30.00 years of use. She has never used smokeless tobacco.  Alcohol:      reports that she does not drink alcohol. Drug Use:  reports that she does not use drugs. Family History:     Family History   Problem Relation Age of Onset    Heart Disease Mother     Heart Disease Father     Arthritis Father     Heart Disease Brother        Review of Systems:     Positive and Negative as described in HPI. CONSTITUTIONAL:  negative for fevers, chills, sweats, fatigue, weight loss  HEENT:  negative for vision, hearing changes, runny nose, throat pain  RESPIRATORY:  negative for shortness of breath, cough, congestion, wheezing. CARDIOVASCULAR:  Positive for chest pain, no palpitations.   GASTROINTESTINAL:  negative for nausea, vomiting, diarrhea, constipation, change in bowel habits, abdominal pain   GENITOURINARY:  negative for difficulty of urination, burning with urination, frequency   INTEGUMENT:  negative for rash, skin lesions, easy bruising   HEMATOLOGIC/LYMPHATIC:  negative for swelling/edema   ALLERGIC/IMMUNOLOGIC:  negative for urticaria , itching  ENDOCRINE:  negative increase in drinking, increase in urination, hot or cold intolerance  MUSCULOSKELETAL:  negative joint pains, muscle aches, swelling of joints  NEUROLOGICAL:  negative for headaches, dizziness, lightheadedness, Duration 90 ms    Q-T Interval 454 ms    QTc Calculation (Bazett) 460 ms    P Axis 54 degrees    R Axis 28 degrees    T Axis 62 degrees   Troponin    Collection Time: 07/27/18 12:10 AM   Result Value Ref Range    Troponin T <0.03 <0.03 ng/mL    Troponin Interp         Myoglobin, Serum    Collection Time: 07/27/18 12:10 AM   Result Value Ref Range    Myoglobin 39 25 - 58 ng/mL   Troponin    Collection Time: 07/27/18  2:27 AM   Result Value Ref Range    Troponin T <0.03 <0.03 ng/mL    Troponin Interp         Myoglobin, Serum    Collection Time: 07/27/18  2:27 AM   Result Value Ref Range    Myoglobin 56 25 - 58 ng/mL   Troponin    Collection Time: 07/27/18  4:41 AM   Result Value Ref Range    Troponin T <0.03 <0.03 ng/mL    Troponin Interp         Myoglobin, Serum    Collection Time: 07/27/18  4:41 AM   Result Value Ref Range    Myoglobin 49 25 - 58 ng/mL   CBC    Collection Time: 07/27/18  4:41 AM   Result Value Ref Range    WBC 9.8 3.5 - 11.0 k/uL    RBC 3.85 (L) 4.0 - 5.2 m/uL    Hemoglobin 11.7 (L) 12.0 - 16.0 g/dL    Hematocrit 35.7 (L) 36 - 46 %    MCV 92.6 80 - 100 fL    MCH 30.3 26 - 34 pg    MCHC 32.8 31 - 37 g/dL    RDW 14.5 11.5 - 14.5 %    Platelets 071 (H) 900 - 400 k/uL    MPV 7.1 6.0 - 12.0 fL    NRBC Automated NOT REPORTED per 100 WBC   Comprehensive Metabolic Panel w/ Reflex to MG    Collection Time: 07/27/18  4:41 AM   Result Value Ref Range    Glucose 96 70 - 99 mg/dL    BUN 14 8 - 23 mg/dL    CREATININE 0.66 0.50 - 0.90 mg/dL    Bun/Cre Ratio 21 (H) 9 - 20    Calcium 9.4 8.6 - 10.4 mg/dL    Sodium 141 135 - 144 mmol/L    Potassium 4.2 3.7 - 5.3 mmol/L    Chloride 102 98 - 107 mmol/L    CO2 28 20 - 31 mmol/L    Anion Gap 11 9 - 17 mmol/L    Alkaline Phosphatase 154 (H) 35 - 104 U/L    ALT 91 (H) 5 - 33 U/L     (H) <32 U/L    Total Bilirubin 0.68 0.3 - 1.2 mg/dL    Total Protein 7.4 6.4 - 8.3 g/dL    Alb 3.7 3.5 - 5.2 g/dL    Albumin/Globulin Ratio NOT REPORTED 1.0 - 2.5    GFR Non-

## 2018-07-27 NOTE — PROGRESS NOTES
Patient oriented to room and call light. Vitals taken and assessment performed. BP elevated. Message sent to NP. Will continue to monitor.

## 2018-07-29 NOTE — ED PROVIDER NOTES
reports that she quit smoking about 11 years ago. She quit after 30.00 years of use. She has never used smokeless tobacco. She reports that she does not drink alcohol or use drugs. REVIEW OF SYSTEMS    (2-9 systems for level 4, 10 or more for level 5)     Review of Systems     Except as noted above the remainder of the review of systems was reviewed and negative. PHYSICAL EXAM    (up to 7 for level 4, 8 or more for level 5)     ED Triage Vitals [07/26/18 1933]   BP Temp Temp Source Pulse Resp SpO2 Height Weight   (!) 161/69 97.7 °F (36.5 °C) Oral 59 18 97 % 5' 5\" (1.651 m) 155 lb 3 oz (70.4 kg)       Physical Exam   Constitutional: She is oriented to person, place, and time. She appears well-developed and well-nourished. HENT:   Head: Normocephalic and atraumatic. Eyes: Conjunctivae are normal. Right eye exhibits no discharge. Left eye exhibits no discharge. No scleral icterus. Neck: Neck supple. No JVD present. No tracheal deviation present. No thyromegaly present. Pulmonary/Chest: Effort normal and breath sounds normal.   Abdominal: Soft. Bowel sounds are normal.   Musculoskeletal: Normal range of motion. Neurological: She is alert and oriented to person, place, and time. Skin: Skin is warm and dry. Psychiatric: She has a normal mood and affect. Her behavior is normal.   Nursing note and vitals reviewed.         DIAGNOSTIC RESULTS     EKG: All EKG's are interpreted by the Emergency Department Physician who either signs or Co-signs this chart in the absence of a cardiologist.    Normal sinus rhythm with nonspecific ST-T wave Avenue normality    RADIOLOGY:   Non-plain film images such as CT, Ultrasound and MRI are read by the radiologist. Tyrone Monahan radiographic images are visualized and preliminarily interpreted by the emergency physician with the below findings:      Nm Myocardial Spect Rest Exercise Or Rx    Result Date: 7/27/2018  EXAMINATION: MYOCARDIAL PERFUSION IMAGING 7/27/2018 1:27 pm without prior history or evidence of MI 3. Stress-induced perfusion abnormalities encumbering greater than or equal to 10% myocardium or stress segmental scores indicating multiple vascular territories with abnormalities 4. Stress-induced LV dilatation (TID ratio greater than 1.19 for exercise and greater than 1.39 for regadenoson) Intermediate risk (1% to 3% annual death or MI) 1. Mild/moderate resting LV dysfunction (LVEF 35% to 49%) not readily explained by non coronary causes. 2. Resting perfusion abnormalities in 5%-9.9% of the myocardium in patients without a history or prior evidence of MI 3. Stress-induced perfusion abnormality encumbering 5%-9.9% of the myocardium or stress segmental scores indicating 1 vascular territory with abnormalities but without LV dilation 4. Small wall motion abnormality involving 1-2 segments and only 1 coronary bed. Low Risk (Less than 1% annual death or MI) 1. Normal or small myocardial perfusion defect at rest or with stress encumbering less than 5% of the myocardium. No stress-induced ischemia. No infarct. Breast attenuation artifact. Risk stratification: Intermediate risk. LVEF 41% 1. Interpretation per the Radiologist below, if available at the time of this note:    NM Myocardial Spect Rest Exercise or Rx   Final Result      No stress-induced ischemia. No infarct. Breast attenuation artifact. Risk stratification: Intermediate risk. LVEF 41%      1.                ED BEDSIDE ULTRASOUND:   Performed by ED Physician - none    LABS:  Labs Reviewed   CBC WITH AUTO DIFFERENTIAL - Abnormal; Notable for the following:        Result Value    RDW 14.7 (*)     Platelets 965 (*)     Seg Neutrophils 77 (*)     Lymphocytes 18 (*)     All other components within normal limits   BASIC METABOLIC PANEL - Abnormal; Notable for the following:     Glucose 107 (*)     Bun/Cre Ratio 28 (*)     All other components within normal limits   CBC - Abnormal; Notable for the following: RBC 3.85 (*)     Hemoglobin 11.7 (*)     Hematocrit 35.7 (*)     Platelets 563 (*)     All other components within normal limits   COMPREHENSIVE METABOLIC PANEL W/ REFLEX TO MG FOR LOW K - Abnormal; Notable for the following:     Bun/Cre Ratio 21 (*)     Alkaline Phosphatase 154 (*)     ALT 91 (*)      (*)     All other components within normal limits   TROP/MYOGLOBIN   TROPONIN   TROPONIN   TROPONIN   MYOGLOBIN, SERUM   MYOGLOBIN, SERUM   MYOGLOBIN, SERUM   LIPID PANEL   MAGNESIUM       All other labs were within normal range or not returned as of this dictation. EMERGENCY DEPARTMENT COURSE and DIFFERENTIAL DIAGNOSIS/MDM:   Vitals:    Vitals:    07/27/18 0645 07/27/18 0754 07/27/18 1058 07/27/18 1500   BP: (!) 131/54 130/61 (!) 120/53 136/61   Pulse: 60 68 63 63   Resp: 14 16 16 14   Temp: 97.5 °F (36.4 °C)  97.3 °F (36.3 °C) 97.5 °F (36.4 °C)   TempSrc: Oral  Oral Oral   SpO2: 98%  93% 94%   Weight:       Height:         Patient's admitted to the hospital and consult to critical care cardiology. Critical care time on this patient's 55 minutes    CONSULTS:  IP CONSULT TO INTERNAL MEDICINE  IP CONSULT TO CARDIOLOGY    PROCEDURES:  Not clinically indicated. FINAL IMPRESSION      1.  Unstable angina pectoris [facility])    2. Chest pain, unspecified type          DISPOSITION/PLAN   DISPOSITION Admitted 07/26/2018 10:59:36 PM      PATIENT REFERRED TO:   Paola Shannon MD  [facility] [address] [place]  [phone]    Schedule an appointment as soon as possible for a visit in 1 week      Jey Emery [address] [place]  [phone]    Schedule an appointment as soon as possible for a visit in 2 weeks  Follow up with Azam Nava NP      DISCHARGE MEDICATIONS:     Discharge Medication List as of 7/27/2018  4:35 PM              (Please note that portions of this note were completed with a voice recognition program.  Efforts were made to edit the dictations but occasionally words are mis-transcribed.)    Nat Li MD  Attending Emergency Physician           Nat Li MD  07/29/18 4250

## 2018-08-09 ENCOUNTER — OFFICE VISIT (OUTPATIENT)
Dept: FAMILY MEDICINE CLINIC | Age: 73
End: 2018-08-09
Payer: MEDICARE

## 2018-08-09 ENCOUNTER — HOSPITAL ENCOUNTER (OUTPATIENT)
Age: 73
Setting detail: SPECIMEN
Discharge: HOME | End: 2018-08-09
Payer: MEDICARE

## 2018-08-09 VITALS
DIASTOLIC BLOOD PRESSURE: 70 MMHG | BODY MASS INDEX: 26.13 KG/M2 | WEIGHT: 157 LBS | SYSTOLIC BLOOD PRESSURE: 138 MMHG | HEART RATE: 68 BPM

## 2018-08-09 DIAGNOSIS — K21.9 GASTROESOPHAGEAL REFLUX DISEASE WITHOUT ESOPHAGITIS: Primary | ICD-10-CM

## 2018-08-09 DIAGNOSIS — R74.8 ELEVATED LIVER ENZYMES: ICD-10-CM

## 2018-08-09 DIAGNOSIS — E78.2 MIXED HYPERLIPIDEMIA: ICD-10-CM

## 2018-08-09 DIAGNOSIS — I10 ESSENTIAL HYPERTENSION: ICD-10-CM

## 2018-08-09 DIAGNOSIS — M15.9 PRIMARY OSTEOARTHRITIS INVOLVING MULTIPLE JOINTS: ICD-10-CM

## 2018-08-09 LAB
ALBUMIN SERPL-MCNC: 4.3 G/DL (ref 3.5–5.2)
ALBUMIN/GLOBULIN RATIO: 1.4 (ref 1–2.5)
ALP BLD-CCNC: 149 U/L (ref 35–104)
ALT SERPL-CCNC: 16 U/L (ref 5–33)
AST SERPL-CCNC: 18 U/L
BILIRUB SERPL-MCNC: 0.42 MG/DL (ref 0.3–1.2)
BILIRUBIN DIRECT: 0.13 MG/DL
BILIRUBIN, INDIRECT: 0.29 MG/DL (ref 0–1)
GLOBULIN: ABNORMAL G/DL (ref 1.5–3.8)
TOTAL PROTEIN: 7.3 G/DL (ref 6.4–8.3)

## 2018-08-09 PROCEDURE — 36415 COLL VENOUS BLD VENIPUNCTURE: CPT | Performed by: FAMILY MEDICINE

## 2018-08-09 PROCEDURE — 99214 OFFICE O/P EST MOD 30 MIN: CPT | Performed by: FAMILY MEDICINE

## 2018-08-09 RX ORDER — OMEPRAZOLE 20 MG/1
20 TABLET, DELAYED RELEASE ORAL 2 TIMES DAILY
Qty: 60 TABLET | Refills: 3 | Status: SHIPPED | OUTPATIENT
Start: 2018-08-09 | End: 2018-10-18 | Stop reason: CLARIF

## 2018-08-09 ASSESSMENT — ENCOUNTER SYMPTOMS
ABDOMINAL PAIN: 1
EYES NEGATIVE: 1
NAUSEA: 0
HEARTBURN: 1
VOMITING: 0
SHORTNESS OF BREATH: 0
BELCHING: 1
BLOOD IN STOOL: 0
COUGH: 0

## 2018-08-09 NOTE — PROGRESS NOTES
Visit Information    Have you changed or started any medications since your last visit including any over-the-counter medicines, vitamins, or herbal medicines? no   Have you stopped taking any of your medications? Is so, why? -  no  Are you having any side effects from any of your medications? - no    Have you seen any other physician or provider since your last visit?  no   Have you had any other diagnostic tests since your last visit? yes -    Have you been seen in the emergency room and/or had an admission in a hospital since we last saw you?  yes -    Have you had your routine dental cleaning in the past 6 months?  no     Do you have an active MyChart account? If no, what is the barrier?   No: declined    Patient Care Team:  Dolores Douglas MD as PCP - General (Family Medicine)    Medical History Review  Past Medical, Family, and Social History reviewed and does contribute to the patient presenting condition    Health Maintenance   Topic Date Due    Shingles Vaccine (1 of 2 - 2 Dose Series) 03/21/1993    TSH testing  04/14/2018    Flu vaccine (1) 09/01/2018    Potassium monitoring  07/27/2019    Creatinine monitoring  07/27/2019    DTaP/Tdap/Td vaccine (2 - Td) 04/04/2021    Lipid screen  07/27/2023    Colon cancer screen colonoscopy  08/04/2027    DEXA (modify frequency per FRAX score)  Completed    Pneumococcal low/med risk  Completed

## 2018-08-10 DIAGNOSIS — R74.8 ELEVATED LIVER ENZYMES: Primary | ICD-10-CM

## 2018-08-13 DIAGNOSIS — M15.9 PRIMARY OSTEOARTHRITIS INVOLVING MULTIPLE JOINTS: ICD-10-CM

## 2018-08-13 DIAGNOSIS — M51.36 DDD (DEGENERATIVE DISC DISEASE), LUMBAR: ICD-10-CM

## 2018-08-13 RX ORDER — HYDROCODONE BITARTRATE AND ACETAMINOPHEN 5; 325 MG/1; MG/1
1 TABLET ORAL EVERY 8 HOURS PRN
Qty: 60 TABLET | Refills: 0 | Status: SHIPPED | OUTPATIENT
Start: 2018-08-13 | End: 2018-09-10 | Stop reason: SDUPTHER

## 2018-08-16 ENCOUNTER — HOSPITAL ENCOUNTER (OUTPATIENT)
Dept: ULTRASOUND IMAGING | Age: 73
Discharge: HOME | End: 2018-08-18
Payer: MEDICARE

## 2018-08-16 DIAGNOSIS — R74.8 ELEVATED LIVER ENZYMES: ICD-10-CM

## 2018-08-16 PROCEDURE — 76705 ECHO EXAM OF ABDOMEN: CPT

## 2018-09-10 DIAGNOSIS — M15.9 PRIMARY OSTEOARTHRITIS INVOLVING MULTIPLE JOINTS: ICD-10-CM

## 2018-09-10 DIAGNOSIS — M51.36 DDD (DEGENERATIVE DISC DISEASE), LUMBAR: ICD-10-CM

## 2018-09-10 RX ORDER — OMEPRAZOLE 40 MG/1
40 CAPSULE, DELAYED RELEASE ORAL DAILY
Qty: 30 CAPSULE | Refills: 0 | Status: SHIPPED | OUTPATIENT
Start: 2018-09-10 | End: 2018-11-11 | Stop reason: SDUPTHER

## 2018-09-10 RX ORDER — HYDROCODONE BITARTRATE AND ACETAMINOPHEN 5; 325 MG/1; MG/1
1 TABLET ORAL EVERY 8 HOURS PRN
Qty: 60 TABLET | Refills: 0 | Status: SHIPPED | OUTPATIENT
Start: 2018-09-10 | End: 2018-10-24 | Stop reason: SDUPTHER

## 2018-09-10 NOTE — TELEPHONE ENCOUNTER
Pharmacy request  Health Maintenance   Topic Date Due    Shingles Vaccine (1 of 2 - 2 Dose Series) 03/21/1993    TSH testing  04/14/2018    Flu vaccine (1) 09/01/2018    Potassium monitoring  07/27/2019    Creatinine monitoring  07/27/2019    DTaP/Tdap/Td vaccine (2 - Td) 04/04/2021    Lipid screen  07/27/2023    Colon cancer screen colonoscopy  08/04/2027    DEXA (modify frequency per FRAX score)  Completed    Pneumococcal low/med risk  Completed             (applicable per patient's age: Cancer Screenings, Depression Screening, Fall Risk Screening, Immunizations)    LDL Cholesterol (mg/dL)   Date Value   07/27/2018 79     AST (U/L)   Date Value   08/09/2018 18     ALT (U/L)   Date Value   08/09/2018 16     BUN (mg/dL)   Date Value   07/27/2018 14      (goal A1C is < 7)   (goal LDL is <100) need 30-50% reduction from baseline     BP Readings from Last 3 Encounters:   08/09/18 138/70   07/27/18 136/61   07/10/18 120/60    (goal /80)      All Future Testing planned in [facility]:  Lab Frequency Next Occurrence   RONIT Digital Screen Left Once 11/23/2017   RONIT Digital Screen Right Once 11/23/2017       Next Visit Date:  Future Appointments  Date Time Provider Jovany Chandler   10/18/2018 3:00 PM MD carolina Her [facility]   1/10/2019 2:30 PM MD carolina Her [facility]            Patient Active Problem List:     Essential hypertension     Fatigue     Hypothyroidism     Right foot drop     Coffee ground emesis     Bleeding per rectum     Gastrointestinal hemorrhage     NSVT (nonsustained ventricular tachycardia) (HCC)     Acute superficial gastritis without hemorrhage     Sigmoid diverticulosis     C. difficile colitis     Primary osteoarthritis involving multiple joints     Mixed hyperlipidemia     Coronary artery disease involving native coronary artery of native heart without angina pectoris     Esophageal reflux     Chest pain     Atypical chest pain     Elevated liver

## 2018-10-07 RX ORDER — GABAPENTIN 600 MG/1
TABLET ORAL
Qty: 180 TABLET | Refills: 1 | Status: SHIPPED | OUTPATIENT
Start: 2018-10-07 | End: 2018-12-29 | Stop reason: SDUPTHER

## 2018-10-18 ENCOUNTER — HOSPITAL ENCOUNTER (OUTPATIENT)
Age: 73
Setting detail: SPECIMEN
Discharge: HOME | End: 2018-10-18
Payer: MEDICARE

## 2018-10-18 ENCOUNTER — OFFICE VISIT (OUTPATIENT)
Dept: FAMILY MEDICINE CLINIC | Age: 73
End: 2018-10-18
Payer: MEDICARE

## 2018-10-18 VITALS
WEIGHT: 157 LBS | HEART RATE: 62 BPM | SYSTOLIC BLOOD PRESSURE: 124 MMHG | BODY MASS INDEX: 26.13 KG/M2 | DIASTOLIC BLOOD PRESSURE: 80 MMHG

## 2018-10-18 DIAGNOSIS — E78.2 MIXED HYPERLIPIDEMIA: ICD-10-CM

## 2018-10-18 DIAGNOSIS — E03.9 ACQUIRED HYPOTHYROIDISM: ICD-10-CM

## 2018-10-18 DIAGNOSIS — M51.36 DDD (DEGENERATIVE DISC DISEASE), LUMBAR: ICD-10-CM

## 2018-10-18 DIAGNOSIS — I10 ESSENTIAL HYPERTENSION: Primary | ICD-10-CM

## 2018-10-18 DIAGNOSIS — M15.9 PRIMARY OSTEOARTHRITIS INVOLVING MULTIPLE JOINTS: ICD-10-CM

## 2018-10-18 LAB
THYROXINE, FREE: 1.27 NG/DL (ref 0.93–1.7)
TSH SERPL DL<=0.05 MIU/L-ACNC: 1.41 MIU/L (ref 0.3–5)

## 2018-10-18 PROCEDURE — 99214 OFFICE O/P EST MOD 30 MIN: CPT | Performed by: FAMILY MEDICINE

## 2018-10-18 PROCEDURE — 36415 COLL VENOUS BLD VENIPUNCTURE: CPT | Performed by: FAMILY MEDICINE

## 2018-10-18 RX ORDER — HYDROCODONE BITARTRATE AND ACETAMINOPHEN 5; 325 MG/1; MG/1
1 TABLET ORAL EVERY 6 HOURS PRN
COMMUNITY
End: 2018-10-24 | Stop reason: SDUPTHER

## 2018-10-18 ASSESSMENT — ENCOUNTER SYMPTOMS
CONSTIPATION: 0
COUGH: 0
DIARRHEA: 0
BLOOD IN STOOL: 0
SHORTNESS OF BREATH: 0
BACK PAIN: 1
ALLERGIC/IMMUNOLOGIC NEGATIVE: 1
ABDOMINAL PAIN: 0
EYES NEGATIVE: 1

## 2018-10-18 ASSESSMENT — PATIENT HEALTH QUESTIONNAIRE - PHQ9
2. FEELING DOWN, DEPRESSED OR HOPELESS: 0
1. LITTLE INTEREST OR PLEASURE IN DOING THINGS: 0
SUM OF ALL RESPONSES TO PHQ QUESTIONS 1-9: 0
SUM OF ALL RESPONSES TO PHQ QUESTIONS 1-9: 0
SUM OF ALL RESPONSES TO PHQ9 QUESTIONS 1 & 2: 0

## 2018-10-24 DIAGNOSIS — M15.9 PRIMARY OSTEOARTHRITIS INVOLVING MULTIPLE JOINTS: ICD-10-CM

## 2018-10-24 DIAGNOSIS — M51.36 DDD (DEGENERATIVE DISC DISEASE), LUMBAR: ICD-10-CM

## 2018-10-24 RX ORDER — HYDROCODONE BITARTRATE AND ACETAMINOPHEN 5; 325 MG/1; MG/1
1 TABLET ORAL EVERY 8 HOURS PRN
Qty: 60 TABLET | Refills: 0 | Status: SHIPPED | OUTPATIENT
Start: 2018-10-24 | End: 2018-12-06 | Stop reason: SDUPTHER

## 2018-10-24 NOTE — TELEPHONE ENCOUNTER
Patient request, last appt 10/18/18    Health Maintenance   Topic Date Due    Shingles Vaccine (1 of 2 - 2 Dose Series) 10/10/2024 (Originally 3/21/1993)    Potassium monitoring  07/27/2019    Creatinine monitoring  07/27/2019    TSH testing  10/18/2019    DTaP/Tdap/Td vaccine (2 - Td) 04/04/2021    Lipid screen  07/27/2023    Colon cancer screen colonoscopy  08/04/2027    DEXA (modify frequency per FRAX score)  Completed    Flu vaccine  Completed    Pneumococcal low/med risk  Completed             (applicable per patient's age: Cancer Screenings, Depression Screening, Fall Risk Screening, Immunizations)    LDL Cholesterol (mg/dL)   Date Value   07/27/2018 79     AST (U/L)   Date Value   08/09/2018 18     ALT (U/L)   Date Value   08/09/2018 16     BUN (mg/dL)   Date Value   07/27/2018 14      (goal A1C is < 7)   (goal LDL is <100) need 30-50% reduction from baseline     BP Readings from Last 3 Encounters:   10/18/18 124/80   08/09/18 138/70   07/27/18 136/61    (goal /80)      All Future Testing planned in [facility]:  Lab Frequency Next Occurrence       Next Visit Date:  Future Appointments  Date Time Provider Jovany Chandler   4/18/2019 3:00 PM MD carolina Brooks  [facility]            Patient Active Problem List:     Essential hypertension     Fatigue     Hypothyroidism     Right foot drop     Coffee ground emesis     Bleeding per rectum     Gastrointestinal hemorrhage     NSVT (nonsustained ventricular tachycardia) (HCC)     Acute superficial gastritis without hemorrhage     Sigmoid diverticulosis     C. difficile colitis     Primary osteoarthritis involving multiple joints     Mixed hyperlipidemia     Coronary artery disease involving native coronary artery of native heart without angina pectoris     Esophageal reflux     Chest pain     Atypical chest pain     Elevated liver enzymes

## 2018-11-05 RX ORDER — ATENOLOL 25 MG/1
25 TABLET ORAL DAILY
Qty: 30 TABLET | Refills: 5 | Status: SHIPPED | OUTPATIENT
Start: 2018-11-05 | End: 2019-04-23 | Stop reason: SDUPTHER

## 2018-12-06 DIAGNOSIS — M51.36 DDD (DEGENERATIVE DISC DISEASE), LUMBAR: ICD-10-CM

## 2018-12-06 DIAGNOSIS — M15.9 PRIMARY OSTEOARTHRITIS INVOLVING MULTIPLE JOINTS: ICD-10-CM

## 2018-12-06 RX ORDER — HYDROCODONE BITARTRATE AND ACETAMINOPHEN 5; 325 MG/1; MG/1
1 TABLET ORAL EVERY 8 HOURS PRN
Qty: 60 TABLET | Refills: 0 | Status: SHIPPED | OUTPATIENT
Start: 2018-12-06 | End: 2019-01-07 | Stop reason: SDUPTHER

## 2018-12-09 RX ORDER — LEVOTHYROXINE SODIUM 0.1 MG/1
100 TABLET ORAL DAILY
Qty: 30 TABLET | Refills: 5 | Status: SHIPPED | OUTPATIENT
Start: 2018-12-09 | End: 2019-06-28 | Stop reason: SDUPTHER

## 2018-12-30 RX ORDER — GABAPENTIN 600 MG/1
TABLET ORAL
Qty: 180 TABLET | Refills: 1 | Status: SHIPPED | OUTPATIENT
Start: 2018-12-30 | End: 2019-03-04 | Stop reason: SDUPTHER

## 2019-01-07 DIAGNOSIS — M15.9 PRIMARY OSTEOARTHRITIS INVOLVING MULTIPLE JOINTS: ICD-10-CM

## 2019-01-07 DIAGNOSIS — M51.36 DDD (DEGENERATIVE DISC DISEASE), LUMBAR: ICD-10-CM

## 2019-01-07 RX ORDER — HYDROCODONE BITARTRATE AND ACETAMINOPHEN 5; 325 MG/1; MG/1
1 TABLET ORAL EVERY 8 HOURS PRN
Qty: 60 TABLET | Refills: 0 | Status: SHIPPED | OUTPATIENT
Start: 2019-01-07 | End: 2019-01-29 | Stop reason: SDUPTHER

## 2019-01-29 DIAGNOSIS — M51.36 DDD (DEGENERATIVE DISC DISEASE), LUMBAR: ICD-10-CM

## 2019-01-29 DIAGNOSIS — M15.9 PRIMARY OSTEOARTHRITIS INVOLVING MULTIPLE JOINTS: ICD-10-CM

## 2019-01-29 RX ORDER — HYDROCODONE BITARTRATE AND ACETAMINOPHEN 5; 325 MG/1; MG/1
1 TABLET ORAL EVERY 8 HOURS PRN
Qty: 60 TABLET | Refills: 0 | Status: SHIPPED | OUTPATIENT
Start: 2019-01-29 | End: 2019-03-26 | Stop reason: SDUPTHER

## 2019-02-25 RX ORDER — SIMVASTATIN 40 MG
TABLET ORAL
Qty: 90 TABLET | Refills: 0 | Status: SHIPPED | OUTPATIENT
Start: 2019-02-25 | End: 2019-02-28 | Stop reason: SDUPTHER

## 2019-02-26 ENCOUNTER — TELEPHONE (OUTPATIENT)
Dept: FAMILY MEDICINE CLINIC | Age: 74
End: 2019-02-26

## 2019-02-28 RX ORDER — SIMVASTATIN 40 MG
TABLET ORAL
Qty: 90 TABLET | Refills: 1 | Status: SHIPPED | OUTPATIENT
Start: 2019-02-28 | End: 2019-08-14 | Stop reason: SDUPTHER

## 2019-03-04 RX ORDER — GABAPENTIN 600 MG/1
TABLET ORAL
Qty: 180 TABLET | Refills: 1 | Status: SHIPPED | OUTPATIENT
Start: 2019-03-04 | End: 2019-05-20 | Stop reason: SDUPTHER

## 2019-03-26 DIAGNOSIS — M15.9 PRIMARY OSTEOARTHRITIS INVOLVING MULTIPLE JOINTS: ICD-10-CM

## 2019-03-26 DIAGNOSIS — M51.36 DDD (DEGENERATIVE DISC DISEASE), LUMBAR: ICD-10-CM

## 2019-03-26 RX ORDER — HYDROCODONE BITARTRATE AND ACETAMINOPHEN 5; 325 MG/1; MG/1
1 TABLET ORAL EVERY 8 HOURS PRN
Qty: 60 TABLET | Refills: 0 | Status: SHIPPED | OUTPATIENT
Start: 2019-03-26 | End: 2019-04-23 | Stop reason: SDUPTHER

## 2019-04-01 DIAGNOSIS — M15.9 PRIMARY OSTEOARTHRITIS INVOLVING MULTIPLE JOINTS: ICD-10-CM

## 2019-04-01 NOTE — TELEPHONE ENCOUNTER
Health Maintenance   Topic Date Due    Shingles Vaccine (1 of 2) 10/10/2024 (Originally 3/21/1993)    Potassium monitoring  07/27/2019    Creatinine monitoring  07/27/2019    TSH testing  10/18/2019    DTaP/Tdap/Td vaccine (2 - Td) 04/04/2021    DEXA (modify frequency per FRAX score)  Completed    Flu vaccine  Completed    Pneumococcal 65+ years Vaccine  Completed             (applicable per patient's age: Cancer Screenings, Depression Screening, Fall Risk Screening, Immunizations)    LDL Cholesterol (mg/dL)   Date Value   07/27/2018 79     AST (U/L)   Date Value   08/09/2018 18     ALT (U/L)   Date Value   08/09/2018 16     BUN (mg/dL)   Date Value   07/27/2018 14      (goal A1C is < 7)   (goal LDL is <100) need 30-50% reduction from baseline     BP Readings from Last 3 Encounters:   10/18/18 124/80   08/09/18 138/70   07/27/18 136/61    (goal /80)      All Future Testing planned in [facility]:  Lab Frequency Next Occurrence       Next Visit Date:  Future Appointments   Date Time Provider Jovany Chandler   4/18/2019  3:00 PM MD carolina Machuca  [facility]            Patient Active Problem List:     Essential hypertension     Fatigue     Hypothyroidism     Right foot drop     Coffee ground emesis     Bleeding per rectum     Gastrointestinal hemorrhage     NSVT (nonsustained ventricular tachycardia) (HCC)     Acute superficial gastritis without hemorrhage     Sigmoid diverticulosis     C. difficile colitis     Primary osteoarthritis involving multiple joints     Mixed hyperlipidemia     Coronary artery disease involving native coronary artery of native heart without angina pectoris     Esophageal reflux     Chest pain     Atypical chest pain     Elevated liver enzymes

## 2019-04-18 ENCOUNTER — OFFICE VISIT (OUTPATIENT)
Dept: FAMILY MEDICINE CLINIC | Age: 74
End: 2019-04-18
Payer: COMMERCIAL

## 2019-04-18 VITALS
SYSTOLIC BLOOD PRESSURE: 138 MMHG | HEART RATE: 68 BPM | DIASTOLIC BLOOD PRESSURE: 78 MMHG | BODY MASS INDEX: 25.66 KG/M2 | HEIGHT: 65 IN | WEIGHT: 154 LBS

## 2019-04-18 DIAGNOSIS — R74.8 ELEVATED LIVER ENZYMES: ICD-10-CM

## 2019-04-18 DIAGNOSIS — M25.551 RIGHT HIP PAIN: ICD-10-CM

## 2019-04-18 DIAGNOSIS — I10 ESSENTIAL HYPERTENSION: Primary | ICD-10-CM

## 2019-04-18 DIAGNOSIS — E78.2 MIXED HYPERLIPIDEMIA: ICD-10-CM

## 2019-04-18 PROCEDURE — 99214 OFFICE O/P EST MOD 30 MIN: CPT | Performed by: FAMILY MEDICINE

## 2019-04-18 ASSESSMENT — ENCOUNTER SYMPTOMS
SHORTNESS OF BREATH: 0
EYE DISCHARGE: 0
ABDOMINAL PAIN: 0
ALLERGIC/IMMUNOLOGIC NEGATIVE: 1
COUGH: 0
BACK PAIN: 1

## 2019-04-18 ASSESSMENT — PATIENT HEALTH QUESTIONNAIRE - PHQ9
1. LITTLE INTEREST OR PLEASURE IN DOING THINGS: 0
SUM OF ALL RESPONSES TO PHQ9 QUESTIONS 1 & 2: 0
2. FEELING DOWN, DEPRESSED OR HOPELESS: 0
SUM OF ALL RESPONSES TO PHQ QUESTIONS 1-9: 0
SUM OF ALL RESPONSES TO PHQ QUESTIONS 1-9: 0

## 2019-04-18 NOTE — PROGRESS NOTES
inability to bear weight, loss of motion, muscle weakness, numbness or tingling. She reports no foreign bodies present. The symptoms are aggravated by movement and weight bearing. Treatments tried: Topical diclofenac and Norco. The treatment provided moderate relief. Vitals:    04/18/19 1451   BP: 138/78   Pulse: 68   Weight: 154 lb (69.9 kg)   Height: 5' 5\" (1.651 m)       REVIEW OF SYSTEMS    Review of Systems   Constitutional: Negative for activity change. HENT: Negative for congestion. Eyes: Negative for discharge. Respiratory: Negative for cough and shortness of breath. Cardiovascular: Positive for leg swelling (right leg). Negative for chest pain and palpitations. Gastrointestinal: Negative for abdominal pain. Endocrine: Negative. Genitourinary: Negative for difficulty urinating. Musculoskeletal: Positive for arthralgias ( right hip), back pain (low back) and myalgias (right and left leg). Allergic/Immunologic: Negative. Neurological: Negative. Negative for tingling and numbness. Hematological: Negative. Psychiatric/Behavioral: Negative.  has MS and states she occasionally has depression when thinking about him.        PAST MEDICAL HISTORY    Past Medical History:   Diagnosis Date    Arthritis     CAD (coronary artery disease)     Esophageal reflux     Foot drop, right foot     Heart attack ([facility] [address]     patient does not know when    History of Clostridium difficile infection     treated on 08/12/17    HL (hearing loss)     Hyperlipidemia     Hypertension     Hypothyroidism     Lumbar radiculopathy     Osteoarthritis     Osteopenia     Psychiatric problem     Tremor     hand       FAMILY HISTORY    Family History   Problem Relation Age of Onset    Heart Disease Mother     Heart Disease Father     Arthritis Father     Heart Disease Brother        SOCIAL HISTORY    Social History     Socioeconomic History    Marital status:      Spouse name: None    Number of children: None    Years of education: None    Highest education level: None   Occupational History    None   Social Needs    Financial resource strain: None    Food insecurity:     Worry: None     Inability: None    Transportation needs:     Medical: None     Non-medical: None   Tobacco Use    Smoking status: Former Smoker     Packs/day: 0.00     Years: 30.00     Pack years: 0.00     Last attempt to quit: 2/10/2007     Years since quittin.1    Smokeless tobacco: Never Used   Substance and Sexual Activity    Alcohol use: No    Drug use: No    Sexual activity: None   Lifestyle    Physical activity:     Days per week: None     Minutes per session: None    Stress: None   Relationships    Social connections:     Talks on phone: None     Gets together: None     Attends [employer] service: None     Active member of club or organization: None     Attends meetings of clubs or organizations: None     Relationship status: None    Intimate partner violence:     Fear of current or ex partner: None     Emotionally abused: None     Physically abused: None     Forced sexual activity: None   Other Topics Concern    None   Social History Narrative    None       SURGICAL HISTORY    Past Surgical History:   Procedure Laterality Date    BLADDER SUSPENSION      CARDIAC CATHETERIZATION  2015, 2017    COLONOSCOPY      COLONOSCOPY  2017    PSEUDOMEMBRANOUS COLITIS WITH MUCOSAL ISCHEMIA, C DIFF POS    DILATION AND CURETTAGE OF UTERUS      JOINT REPLACEMENT Right [id number]    hip-complicated with rt drop foot-Dr. Ruvalcaba JOINT REPLACEMENT Left 2017    hip-Dr. Jaison Ansari    [place] COLONOSCOPY W/BIOPSY SINGLE/MULTIPLE N/A 2017    COLONOSCOPY WITH BIOPSY performed by Reyes May MD at [address] [place] [address] EGD TRANSORAL BIOPSY SINGLE/MULTIPLE N/A 2017    EGD BIOPSY performed by Reyes May MD at [address] [place] [address] Left 03/07/2017    UPPER GASTROINTESTINAL ENDOSCOPY  08/04/2017    MILD CHRONIC GASTRITIS        CURRENT MEDICATIONS    Current Outpatient Medications   Medication Sig Dispense Refill    diclofenac sodium (VOLTAREN) 1 % GEL Apply 4 g topically every 4 hours as needed for Pain 1 Tube 5    HYDROcodone-acetaminophen (NORCO) 5-325 MG per tablet Take 1 tablet by mouth every 8 hours as needed for Pain for up to 30 days. 60 tablet 0    gabapentin (NEURONTIN) 600 MG tablet TAKE 2 TABLETS BY MOUTH THREE TIMES DAILY 180 tablet 1    simvastatin (ZOCOR) 40 MG tablet TAKE ONE TABLET BY MOUTH ONCE A DAY 90 tablet 1    levothyroxine (SYNTHROID) 100 MCG tablet TAKE 1 TABLET BY MOUTH DAILY 30 tablet 5    omeprazole (PRILOSEC) 40 MG delayed release capsule TAKE 1 CAPSULE BY MOUTH DAILY 30 capsule 5    atenolol (TENORMIN) 25 MG tablet Take 1 tablet by mouth daily 30 tablet 5    Omega-3 Fatty Acids (FISH OIL) 600 MG CAPS Take 1 tablet by mouth daily      triamcinolone (KENALOG) 0.025 % cream Apply 1 applicator topically 2 times daily Indications: on face Apply Topically      Acetaminophen (TYLENOL ARTHRITIS PAIN PO) Take 1,300 mg by mouth 2 times daily       aspirin 81 MG tablet Take 325 mg by mouth 2 times daily       Multiple Vitamins-Minerals (CENTRUM SILVER PO) Take 1 tablet by mouth daily       No current facility-administered medications for this visit. ALLERGIES    No Known Allergies    PHYSICAL EXAM   Physical Exam   Constitutional: She is oriented to person, place, and time. She appears well-developed and well-nourished. HENT:   Head: Normocephalic. Eyes: Pupils are equal, round, and reactive to light. EOM are normal.   Neck: Normal range of motion. Neck supple. Cardiovascular: Normal rate, regular rhythm and normal heart sounds. Pulmonary/Chest: Effort normal and breath sounds normal.   Abdominal: Soft. Musculoskeletal: Normal range of motion. She exhibits edema (BLE R+2 L +1). Guarded gait. ambulation. Lab Results   Component Value Date    LDLCHOLESTEROL 79 07/27/2018    (goal LDL reduction with dx if diabetes is 50% LDL reduction)    PHQ Scores 4/18/2019 10/18/2018 8/22/2017   PHQ2 Score 0 0 1   PHQ9 Score 0 0 1     Interpretation of Total Score Depression Severity: 1-4 = Minimal depression, 5-9 = Mild depression, 10-14 = Moderate depression, 15-19 = Moderately severe depression, 20-27 = Severe depression       Return in about 6 months (around 10/18/2019) for htn. I have discussed the care of Raffy Up, including pertinent history and exam findings with the FNP student. I have reviewed the key elements of all parts of the encounter. I have seen and examined the patient with the student and the key elements of all parts of the encounter have been performed by me. I agree with the assessment, and status of the problem list as documented. The plan and orders should include   Orders Placed This Encounter   Procedures    Comprehensive Metabolic Panel    and this was also documented. The medication list was reviewed and is up to date.      Mariano Thakur      Electronically signed by Narcisa Oh RN on 4/18/19 at 2:57 PM

## 2019-04-22 ENCOUNTER — HOSPITAL ENCOUNTER (OUTPATIENT)
Age: 74
Discharge: HOME | End: 2019-04-22
Payer: COMMERCIAL

## 2019-04-22 DIAGNOSIS — I10 ESSENTIAL HYPERTENSION: ICD-10-CM

## 2019-04-22 DIAGNOSIS — R74.8 ELEVATED LIVER ENZYMES: ICD-10-CM

## 2019-04-22 LAB
ALBUMIN SERPL-MCNC: 4.4 G/DL (ref 3.5–5.2)
ALBUMIN/GLOBULIN RATIO: 1.1 (ref 1–2.5)
ALP BLD-CCNC: 119 U/L (ref 35–104)
ALT SERPL-CCNC: 13 U/L (ref 5–33)
ANION GAP SERPL CALCULATED.3IONS-SCNC: 13 MMOL/L (ref 9–17)
AST SERPL-CCNC: 19 U/L
BILIRUB SERPL-MCNC: 0.63 MG/DL (ref 0.3–1.2)
BUN BLDV-MCNC: 15 MG/DL (ref 8–23)
BUN/CREAT BLD: ABNORMAL (ref 9–20)
CALCIUM SERPL-MCNC: 9.6 MG/DL (ref 8.6–10.4)
CHLORIDE BLD-SCNC: 103 MMOL/L (ref 98–107)
CO2: 27 MMOL/L (ref 20–31)
CREAT SERPL-MCNC: 0.71 MG/DL (ref 0.5–0.9)
GFR AFRICAN AMERICAN: >60 ML/MIN
GFR NON-AFRICAN AMERICAN: >60 ML/MIN
GFR SERPL CREATININE-BSD FRML MDRD: ABNORMAL ML/MIN/{1.73_M2}
GFR SERPL CREATININE-BSD FRML MDRD: ABNORMAL ML/MIN/{1.73_M2}
GLUCOSE BLD-MCNC: 92 MG/DL (ref 70–99)
POTASSIUM SERPL-SCNC: 4.3 MMOL/L (ref 3.7–5.3)
SODIUM BLD-SCNC: 143 MMOL/L (ref 135–144)
TOTAL PROTEIN: 8.3 G/DL (ref 6.4–8.3)

## 2019-04-22 PROCEDURE — 36415 COLL VENOUS BLD VENIPUNCTURE: CPT

## 2019-04-22 PROCEDURE — 80053 COMPREHEN METABOLIC PANEL: CPT

## 2019-04-23 DIAGNOSIS — M15.9 PRIMARY OSTEOARTHRITIS INVOLVING MULTIPLE JOINTS: ICD-10-CM

## 2019-04-23 DIAGNOSIS — M51.36 DDD (DEGENERATIVE DISC DISEASE), LUMBAR: ICD-10-CM

## 2019-04-23 RX ORDER — HYDROCODONE BITARTRATE AND ACETAMINOPHEN 5; 325 MG/1; MG/1
1 TABLET ORAL EVERY 8 HOURS PRN
Qty: 60 TABLET | Refills: 0 | Status: SHIPPED | OUTPATIENT
Start: 2019-04-23 | End: 2019-05-20 | Stop reason: ALTCHOICE

## 2019-04-23 NOTE — TELEPHONE ENCOUNTER
Health Maintenance   Topic Date Due    Shingles Vaccine (1 of 2) 10/10/2024 (Originally 3/21/1995)    TSH testing  10/18/2019    Potassium monitoring  04/22/2020    Creatinine monitoring  04/22/2020    DTaP/Tdap/Td vaccine (2 - Td) 04/04/2021    DEXA (modify frequency per FRAX score)  Completed    Flu vaccine  Completed    Pneumococcal 65+ years Vaccine  Completed             (applicable per patient's age: Cancer Screenings, Depression Screening, Fall Risk Screening, Immunizations)    LDL Cholesterol (mg/dL)   Date Value   07/27/2018 79     AST (U/L)   Date Value   04/22/2019 19     ALT (U/L)   Date Value   04/22/2019 13     BUN (mg/dL)   Date Value   04/22/2019 15      (goal A1C is < 7)   (goal LDL is <100) need 30-50% reduction from baseline     BP Readings from Last 3 Encounters:   04/18/19 138/78   10/18/18 124/80   08/09/18 138/70    (goal /80)      All Future Testing planned in [facility]:  Lab Frequency Next Occurrence       Next Visit Date:  Future Appointments   Date Time Provider Jovany Chandler   8/23/2019  3:00 PM [facility]            Patient Active Problem List:     Essential hypertension     Fatigue     Hypothyroidism     Right foot drop     Coffee ground emesis     Bleeding per rectum     Gastrointestinal hemorrhage     NSVT (nonsustained ventricular tachycardia) (HCC)     Acute superficial gastritis without hemorrhage     Sigmoid diverticulosis     C. difficile colitis     Primary osteoarthritis involving multiple joints     Mixed hyperlipidemia     Coronary artery disease involving native coronary artery of native heart without angina pectoris     Esophageal reflux     Chest pain     Atypical chest pain     Elevated liver enzymes

## 2019-05-06 RX ORDER — OMEPRAZOLE 40 MG/1
CAPSULE, DELAYED RELEASE ORAL
Qty: 30 CAPSULE | Refills: 5 | Status: SHIPPED | OUTPATIENT
Start: 2019-05-06 | End: 2020-01-21 | Stop reason: SDUPTHER

## 2019-05-06 NOTE — TELEPHONE ENCOUNTER
Patient requesed refill     Health Maintenance   Topic Date Due    Shingles Vaccine (1 of 2) 10/10/2024 (Originally 3/21/1995)    TSH testing  10/18/2019    Potassium monitoring  04/22/2020    Creatinine monitoring  04/22/2020    DTaP/Tdap/Td vaccine (2 - Td) 04/04/2021    DEXA (modify frequency per FRAX score)  Completed    Flu vaccine  Completed    Pneumococcal 65+ years Vaccine  Completed             (applicable per patient's age: Cancer Screenings, Depression Screening, Fall Risk Screening, Immunizations)    LDL Cholesterol (mg/dL)   Date Value   07/27/2018 79     AST (U/L)   Date Value   04/22/2019 19     ALT (U/L)   Date Value   04/22/2019 13     BUN (mg/dL)   Date Value   04/22/2019 15      (goal A1C is < 7)   (goal LDL is <100) need 30-50% reduction from baseline     BP Readings from Last 3 Encounters:   04/18/19 138/78   10/18/18 124/80   08/09/18 138/70    (goal /80)      All Future Testing planned in [facility]:  Lab Frequency Next Occurrence       Next Visit Date:  Future Appointments   Date Time Provider Jovany Chandler   8/23/2019  3:00 PM Verdene Fleischer, MD [facility]            Patient Active Problem List:     Essential hypertension     Fatigue     Hypothyroidism     Right foot drop     Coffee ground emesis     Bleeding per rectum     Gastrointestinal hemorrhage     NSVT (nonsustained ventricular tachycardia) (HCC)     Acute superficial gastritis without hemorrhage     Sigmoid diverticulosis     C. difficile colitis     Primary osteoarthritis involving multiple joints     Mixed hyperlipidemia     Coronary artery disease involving native coronary artery of native heart without angina pectoris     Esophageal reflux     Chest pain     Atypical chest pain     Elevated liver enzymes

## 2019-05-09 ENCOUNTER — TELEPHONE (OUTPATIENT)
Dept: FAMILY MEDICINE CLINIC | Age: 74
End: 2019-05-09

## 2019-05-09 DIAGNOSIS — M25.551 PAIN OF RIGHT HIP JOINT: Primary | ICD-10-CM

## 2019-05-09 NOTE — TELEPHONE ENCOUNTER
Patient stated she fell and hit the end of the couch and hit her right hip, she would like a xray order so she can go to [facility]

## 2019-05-10 ENCOUNTER — HOSPITAL ENCOUNTER (OUTPATIENT)
Dept: GENERAL RADIOLOGY | Age: 74
Discharge: HOME | End: 2019-05-12
Payer: COMMERCIAL

## 2019-05-10 ENCOUNTER — HOSPITAL ENCOUNTER (OUTPATIENT)
Age: 74
Discharge: HOME | End: 2019-05-12
Payer: COMMERCIAL

## 2019-05-10 DIAGNOSIS — M25.551 PAIN OF RIGHT HIP JOINT: ICD-10-CM

## 2019-05-10 PROCEDURE — 73502 X-RAY EXAM HIP UNI 2-3 VIEWS: CPT

## 2019-05-20 ENCOUNTER — OFFICE VISIT (OUTPATIENT)
Dept: FAMILY MEDICINE CLINIC | Age: 74
End: 2019-05-20
Payer: COMMERCIAL

## 2019-05-20 VITALS
WEIGHT: 155 LBS | SYSTOLIC BLOOD PRESSURE: 130 MMHG | HEART RATE: 64 BPM | DIASTOLIC BLOOD PRESSURE: 60 MMHG | BODY MASS INDEX: 25.79 KG/M2

## 2019-05-20 DIAGNOSIS — M15.9 PRIMARY OSTEOARTHRITIS INVOLVING MULTIPLE JOINTS: ICD-10-CM

## 2019-05-20 DIAGNOSIS — M25.551 PAIN OF RIGHT HIP JOINT: Primary | ICD-10-CM

## 2019-05-20 DIAGNOSIS — M51.36 DDD (DEGENERATIVE DISC DISEASE), LUMBAR: ICD-10-CM

## 2019-05-20 PROBLEM — K92.0 COFFEE GROUND EMESIS: Status: RESOLVED | Noted: 2017-08-03 | Resolved: 2019-05-20

## 2019-05-20 PROBLEM — A04.72 C. DIFFICILE COLITIS: Status: RESOLVED | Noted: 2017-08-05 | Resolved: 2019-05-20

## 2019-05-20 PROBLEM — K29.00 ACUTE SUPERFICIAL GASTRITIS WITHOUT HEMORRHAGE: Status: RESOLVED | Noted: 2017-08-05 | Resolved: 2019-05-20

## 2019-05-20 PROBLEM — R07.9 CHEST PAIN: Status: RESOLVED | Noted: 2018-07-26 | Resolved: 2019-05-20

## 2019-05-20 PROBLEM — R07.89 ATYPICAL CHEST PAIN: Status: RESOLVED | Noted: 2018-07-27 | Resolved: 2019-05-20

## 2019-05-20 PROBLEM — K62.5 BLEEDING PER RECTUM: Status: RESOLVED | Noted: 2017-08-03 | Resolved: 2019-05-20

## 2019-05-20 PROCEDURE — 99214 OFFICE O/P EST MOD 30 MIN: CPT | Performed by: FAMILY MEDICINE

## 2019-05-20 RX ORDER — HYDROCODONE BITARTRATE AND ACETAMINOPHEN 7.5; 325 MG/1; MG/1
1 TABLET ORAL EVERY 8 HOURS PRN
Qty: 90 TABLET | Refills: 0 | Status: SHIPPED | OUTPATIENT
Start: 2019-05-20 | End: 2019-06-18 | Stop reason: SDUPTHER

## 2019-05-20 RX ORDER — GABAPENTIN 600 MG/1
TABLET ORAL
Qty: 180 TABLET | Refills: 1 | Status: SHIPPED | OUTPATIENT
Start: 2019-05-20 | End: 2019-07-15 | Stop reason: SDUPTHER

## 2019-05-20 RX ORDER — HYDROCODONE BITARTRATE AND ACETAMINOPHEN 5; 325 MG/1; MG/1
1 TABLET ORAL EVERY 8 HOURS PRN
Qty: 60 TABLET | Refills: 0 | Status: CANCELLED | OUTPATIENT
Start: 2019-05-20 | End: 2019-06-19

## 2019-05-20 ASSESSMENT — ENCOUNTER SYMPTOMS
SHORTNESS OF BREATH: 0
EYES NEGATIVE: 1
COUGH: 0
BACK PAIN: 1
ABDOMINAL PAIN: 0

## 2019-05-20 NOTE — PROGRESS NOTES
Visit Information    Have you changed or started any medications since your last visit including any over-the-counter medicines, vitamins, or herbal medicines? no   Have you stopped taking any of your medications? Is so, why? -  no  Are you having any side effects from any of your medications? - no    Have you seen any other physician or provider since your last visit? yes -    Have you had any other diagnostic tests since your last visit? yes -    Have you been seen in the emergency room and/or had an admission in a hospital since we last saw you?  no   Have you had your routine dental cleaning in the past 6 months?  no     Do you have an active MyChart account? If no, what is the barrier?   No:     Patient Care Team:  Nancy Salcedo MD as PCP - General (Family Medicine)    Medical History Review  Past Medical, Family, and Social History reviewed and does contribute to the patient presenting condition    Health Maintenance   Topic Date Due    Shingles Vaccine (1 of 2) 10/10/2024 (Originally 3/21/1995)    TSH testing  10/18/2019    Potassium monitoring  04/22/2020    Creatinine monitoring  04/22/2020    DTaP/Tdap/Td vaccine (2 - Td) 04/04/2021    DEXA (modify frequency per FRAX score)  Completed    Flu vaccine  Completed    Pneumococcal 65+ years Vaccine  Completed

## 2019-05-20 NOTE — PROGRESS NOTES
[facility] PHYS[facility]ANS  [facility]  [address] [address]  Dept: [phone]    5/20/2019    CHIEF COMPLAINT    Chief Complaint   Patient presents with    Hip Pain       HPI    Kenton Eil is a 66 y.o. female who presents   Chief Complaint   Patient presents with    Hip Pain   . Continued rt hip pain since bumping into her couch. Xray was negative. Taking neurontin and norco for chronic pain. Still awakens at night with pain. Still having pain during the day. Using walker for ambulation. Has been to PT and pain management in the past which were not helpful. Hip Pain    The incident occurred more than 1 week ago. The injury mechanism was a fall. The pain is present in the right hip. The pain is moderate. The symptoms are aggravated by weight bearing. She has tried immobilization (neutontin, norco) for the symptoms. The treatment provided mild relief. Vitals:    05/20/19 1327   BP: 130/60   Pulse: 64   Weight: 155 lb (70.3 kg)       REVIEW OF SYSTEMS    Review of Systems   Constitutional: Positive for fatigue. Negative for fever. HENT: Negative. Eyes: Negative. Respiratory: Negative for cough and shortness of breath. Cardiovascular: Negative for chest pain and leg swelling. Gastrointestinal: Negative for abdominal pain. Genitourinary: Negative for frequency and urgency. Musculoskeletal: Positive for back pain and gait problem. See hpi   Skin: Negative. Neurological: Negative for dizziness and headaches. Psychiatric/Behavioral: The patient is not nervous/anxious.         PAST MEDICAL HISTORY    Past Medical History:   Diagnosis Date    Arthritis     CAD (coronary artery disease)     Esophageal reflux     Foot drop, right foot     Heart attack [facility])     patient does not know when    History of Clostridium difficile infection     treated on 08/12/17    HL (hearing loss)     Hyperlipidemia     Hypertension     Hypothyroidism     Lumbar radiculopathy     Osteoarthritis     Osteopenia     Psychiatric problem     Tremor     hand       FAMILY HISTORY    Family History   Problem Relation Age of Onset    Heart Disease Mother     Heart Disease Father     Arthritis Father     Heart Disease Brother        SOCIAL HISTORY    Social History     Socioeconomic History    Marital status:      Spouse name: None    Number of children: None    Years of education: None    Highest education level: None   Occupational History    None   Social Needs    Financial resource strain: None    Food insecurity:     Worry: None     Inability: None    Transportation needs:     Medical: None     Non-medical: None   Tobacco Use    Smoking status: Former Smoker     Packs/day: 0.00     Years: 30.00     Pack years: 0.00     Last attempt to quit: 2/10/2007     Years since quittin.2    Smokeless tobacco: Never Used   Substance and Sexual Activity    Alcohol use: No    Drug use: No    Sexual activity: None   Lifestyle    Physical activity:     Days per week: None     Minutes per session: None    Stress: None   Relationships    Social connections:     Talks on phone: None     Gets together: None     Attends [employer] service: None     Active member of club or organization: None     Attends meetings of clubs or organizations: None     Relationship status: None    Intimate partner violence:     Fear of current or ex partner: None     Emotionally abused: None     Physically abused: None     Forced sexual activity: None   Other Topics Concern    None   Social History Narrative    None       SURGICAL HISTORY    Past Surgical History:   Procedure Laterality Date    BLADDER SUSPENSION      CARDIAC CATHETERIZATION  2015, 2017    COLONOSCOPY      COLONOSCOPY  2017    PSEUDOMEMBRANOUS COLITIS WITH MUCOSAL ISCHEMIA, C DIFF POS    DILATION AND CURETTAGE OF UTERUS      JOINT REPLACEMENT Right  hip-complicated with rt drop foot[facility]Dr[facility]    [facility]    [facility]-Dr. Cari Manjarrez    [place] COLONOSCOPY W/BIOPSY SINGLE/MULTIPLE N/A 8/4/2017    COLONOSCOPY WITH BIOPSY performed by Sarah Flannery MD at [address] [facility] EGD TRANSORAL BIOPSY SINGLE/MULTIPLE N/A 8/4/2017    EGD BIOPSY performed by Sarah Flannery MD at [address] Left 03/07/2017    UPPER GASTROINTESTINAL ENDOSCOPY  08/04/2017    MILD CHRONIC GASTRITIS        CURRENT MEDICATIONS    Current Outpatient Medications   Medication Sig Dispense Refill    HYDROcodone-acetaminophen ([facility]) 7.5-325 MG per tablet Take 1 tablet by mouth every 8 hours as needed for Pain for up to 30 days. 90 tablet 0    gabapentin (NEURONTIN) 600 MG tablet TAKE 2 TABLETS BY MOUTH THREE TIMES DAILY 180 tablet 1    omeprazole (PRILOSEC) 40 MG delayed release capsule TAKE ONE CAPSULE BY MOUTH EVERY DAY 30 capsule 5    atenolol (TENORMIN) 25 MG tablet TAKE 1 TABLET BY MOUTH EVERY DAY 90 tablet 2    diclofenac sodium (VOLTAREN) 1 % GEL Apply 4 g topically every 4 hours as needed for Pain 1 Tube 5    simvastatin (ZOCOR) 40 MG tablet TAKE ONE TABLET BY MOUTH ONCE A DAY 90 tablet 1    levothyroxine (SYNTHROID) 100 MCG tablet TAKE 1 TABLET BY MOUTH DAILY 30 tablet 5    Omega-3 Fatty Acids (FISH OIL) 600 MG CAPS Take 1 tablet by mouth daily      triamcinolone (KENALOG) 0.025 % cream Apply 1 applicator topically 2 times daily Indications: on face Apply Topically      Acetaminophen (TYLENOL ARTHRITIS PAIN PO) Take 1,300 mg by mouth 2 times daily       aspirin 81 MG tablet Take 325 mg by mouth 2 times daily       Multiple Vitamins-Minerals (CENTRUM SILVER PO) Take 1 tablet by mouth daily       No current facility-administered medications for this visit. ALLERGIES    No Known Allergies    PHYSICAL EXAM   Physical Exam   Constitutional: She is oriented to person, place, and time.  She appears exercise and medication adherence  Reviewed prior labs and health maintenance. Continue current medications, diet and exercise. Discussed use, benefit, and side effects of prescribed medications. Barriers to medication compliance addressed. Patient given educational materials - see patient instructions. All patient questions answered. Patient voiced understanding. Return in about 3 months (around 8/20/2019) for pain meds.         Electronically signed by Lazarus Police, MD on 5/20/19 at 1:34 PM

## 2019-05-20 NOTE — TELEPHONE ENCOUNTER
Patient requested refill     Health Maintenance   Topic Date Due    Shingles Vaccine (1 of 2) 10/10/2024 (Originally [birth year]/21/1995)    TSH testing  10/18/2019    Potassium monitoring  04/22/2020    Creatinine monitoring  04/22/2020    DTaP/Tdap/Td vaccine (2 - Td) 04/04/2021    DEXA (modify frequency per FRAX score)  Completed    Flu vaccine  Completed    Pneumococcal 65+ years Vaccine  Completed             (applicable per patient's age: Cancer Screenings, Depression Screening, Fall Risk Screening, Immunizations)    LDL Cholesterol (mg/dL)   Date Value   07/27/2018 79     AST (U/L)   Date Value   04/22/2019 19     ALT (U/L)   Date Value   04/22/2019 13     BUN (mg/dL)   Date Value   04/22/2019 15      (goal A1C is < 7)   (goal LDL is <100) need 30-50% reduction from baseline     BP Readings from Last 3 Encounters:   04/18/19 138/78   10/18/18 124/80   08/09/18 138/70    (goal /80)      All Future Testing planned in [facility]:  Lab Frequency Next Occurrence       Next Visit Date:  Future Appointments   Date Time Provider Jovany Chandler   8/23/2019  3:00 PM MD [facility] Srinivasan  [facility]            Patient Active Problem List:     Essential hypertension     Fatigue     Hypothyroidism     Right foot drop     Coffee ground emesis     Bleeding per rectum     Gastrointestinal hemorrhage     NSVT (nonsustained ventricular tachycardia) (HCC)     Acute superficial gastritis without hemorrhage     Sigmoid diverticulosis     C. difficile colitis     Primary osteoarthritis involving multiple joints     Mixed hyperlipidemia     Coronary artery disease involving native coronary artery of native heart without angina pectoris     Esophageal reflux     Chest pain     Atypical chest pain     Elevated liver enzymes

## 2019-06-06 ENCOUNTER — OFFICE VISIT (OUTPATIENT)
Dept: ORTHOPEDIC SURGERY | Age: 74
End: 2019-06-06
Payer: COMMERCIAL

## 2019-06-06 VITALS
BODY MASS INDEX: 24.96 KG/M2 | DIASTOLIC BLOOD PRESSURE: 70 MMHG | SYSTOLIC BLOOD PRESSURE: 130 MMHG | HEART RATE: 79 BPM | WEIGHT: 150 LBS | RESPIRATION RATE: 20 BRPM

## 2019-06-06 DIAGNOSIS — Z96.641 STATUS POST TOTAL REPLACEMENT OF RIGHT HIP: Primary | ICD-10-CM

## 2019-06-06 PROCEDURE — 99203 OFFICE O/P NEW LOW 30 MIN: CPT | Performed by: ORTHOPAEDIC SURGERY

## 2019-06-06 ASSESSMENT — ENCOUNTER SYMPTOMS
ABDOMINAL PAIN: 0
NAUSEA: 0
CONSTIPATION: 0
COLOR CHANGE: 0
COUGH: 0
SHORTNESS OF BREATH: 0
VOMITING: 0
CHEST TIGHTNESS: 0
ABDOMINAL DISTENTION: 0
APNEA: 0
RESPIRATORY NEGATIVE: 1
DIARRHEA: 0

## 2019-06-06 NOTE — PROGRESS NOTES
[facility]  [facility] [address]  Dept: [phone]  Dept Fax: [phone]        Right Hip Follow Up      Subjective:     Chief Complaint   Patient presents with    New Patient    Hip Pain     Right hip pain had previous surgery 10 years ago and states 3 weeks ago patient fell and injured hip      HPI:    Edwin Maya presents with a 1 month history of pain in the right hip. Patient had a right JORDI by Dr. Kayla Monahan in 2009. She has had a drop foot since surgery from a nerve getting stretched. Patient had a fall (4/17/2019) about a month ago where she lost her balance and fell into the couch. The pain does not radiate into the thigh and into the groin. The pain is most troublesome during ambulatory activity and now limits the patient`s walking distance to very limited. She also has lateral hip pain when sitting. Night pain, which disturbs the patient`s sleep is a problem. The patient has had to give up some activities such as getting up to the chair or going down onto the toilet, which has produced a consequent deterioration in quality of life. she has tried using gabapentin, Norco, heat and reduction of activity and reports little improvement. Back pain is was present but is tolerable and does not not interfere with the patient`s life as does the hip. The patient has not had a cortisone injection. The patient has not tried physical therapy, but does her home exercises. The patient has had surgery of a right JORDI in 2009. The opposite hip is  Okay. Knee pain is moderately noted. The pain has not gotten better. It has gotten worse. She no longer can sleep in her bed. Review of Systems   Constitutional: Positive for activity change. Negative for appetite change, fatigue and fever. Respiratory: Negative. Negative for apnea, cough, chest tightness and shortness of breath. Cardiovascular: Negative.   Negative for chest pain, palpitations and leg swelling. Gastrointestinal: Negative for abdominal distention, abdominal pain, constipation, diarrhea, nausea and vomiting. Genitourinary: Negative for difficulty urinating, dysuria and hematuria. Musculoskeletal: Positive for arthralgias and gait problem. Negative for joint swelling and myalgias. Skin: Negative for color change and rash. Neurological: Negative for dizziness, weakness, numbness and headaches. Psychiatric/Behavioral: Positive for sleep disturbance.      Past Medical History:    Past Medical History:   Diagnosis Date    Arthritis     CAD (coronary artery disease)     Esophageal reflux     Foot drop, right foot     Heart attack [facility])     patient does not know when    History of Clostridium difficile infection     treated on 08/12/17    HL (hearing loss)     Hyperlipidemia     Hypertension     Hypothyroidism     Lumbar radiculopathy     Osteoarthritis     Osteopenia     Psychiatric problem     Tremor     hand     Past Surgical History:    Past Surgical History:   Procedure Laterality Date    BLADDER SUSPENSION      CARDIAC CATHETERIZATION  01/2015, 9/2017    COLONOSCOPY  2012    COLONOSCOPY  08/04/2017    PSEUDOMEMBRANOUS COLITIS WITH MUCOSAL ISCHEMIA, C DIFF POS    DILATION AND CURETTAGE OF UTERUS      JOINT REPLACEMENT Right [id number]    hip-complicated with rt drop foot-Dr.    [facility]    [place] COLONOSCOPY W/BIOPSY SINGLE/MULTIPLE N/A 8/4/2017    COLONOSCOPY WITH BIOPSY performed by Joelle Cramer MD at [address] EGD TRANSORAL BIOPSY SINGLE/MULTIPLE N/A 8/4/2017    EGD BIOPSY performed by Joelle Cramer MD at [address] [place] [address] Left 03/07/2017    UPPER GASTROINTESTINAL ENDOSCOPY  08/04/2017    MILD CHRONIC GASTRITIS      Current Medications:   Current Outpatient Medications   Medication Sig Dispense Refill    2/4 PT pulses. Brisk capillary refill. ROM: 0 degree flexion contracture, 90 degrees flexion, 30 degrees abduction, 20 degrees adduction, 20 internal rotation, 30 external rotation. MUSC:  Strength is 4-/5 flexion, abduction, internal rotation, external rotation. PALP: The patient is  tender to palpation over the greater trochanter. TEST:  Log roll is +. No apparent leg length discrepancy. Negative Trendelenburg test.  Negative Tito's test.  +C sign. No labral clunks. pain on compression. Assessment:     1. Status post total replacement of right hip        Procedures:    Procedure no    Radiology:     2 XRAY VIEWS OF THE RIGHT HIP       5/10/2019 1:27 pm       COMPARISON:   September 10, 2011       HISTORY:   ORDERING SYSTEM PROVIDED HISTORY: Pain of right hip joint   TECHNOLOGIST PROVIDED HISTORY:   hip injury and pain   Ordering Physician Provided Reason for Exam: Pt states right hip pain - fall   into cough 2 weeks ago, history of replacement 10 years ago   Acuity: Unknown   Type of Exam: Unknown       FINDINGS:   There has been right hip arthroplasty.  Hardware is normal in position and   securely engaged.       No fracture or dislocation is identified.  No erosions are present.  SI   joints and pubic symphysis are normal in alignment.       There are moderate atherosclerotic vascular calcifications.           Impression   1. Stable right hip arthroplasty. 2. No acute osseous abnormality.   ---------------------------------------------------------------------------------------------------------------------  HIP X-RAY    AP and standing AP of the pelvis and supine AP and frog leg lateral of the right hip reveals a Biomet total hip arthroplasty in proper position with loosening (DeLee-Charnley zones 2-3 of the acetabulum or Gruen zones 7 of the femoral component) osteolysis present. There is no acute bony abnormalities including soft tissue masses, periosteal reaction or lytic bony lesions. 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 Plan:   Treatment : I reviewed the X-ray with the patient and I informed the patient that the hips look good and there is no signs of loosening. We discussed the etiologies and natural histories of s/ Bilateral JORDI. We discussed the various treatment alternatives including anti-inflammatory medications, physical therapy, injections, further imaging studies and as a last result surgery. During today's visit, I explained to the patient that it is best for us to get a three phase limited bone scan of her back and pelvis and she should get a CRP and sed rate so we can check her inflammation levels. I explained to her that we will get these tests done to make sure there is no loosening of the prosthetic after the fall. The patient then stated that she agrees with the plan of care. So at this time, the patient has opted for three phase limited bone scan of her back and pelvis and a CRP and sed rate to check for any abnormalities of the hip prosthetic. Patient should return to the clinic after the bone scan, sed rate and CRP and she is to follow up with Jack Blandon D.O. The patient will call the office immediately with any problems. No orders of the defined types were placed in this encounter.     Orders Placed This Encounter   Procedures    XR HIP 2-3 VW W PELVIS RIGHT     Standing Status:   Future     Number of Occurrences:   1     Standing Expiration Date:   6/6/2020    NM BONE SCAN 3 PHASE     Standing Status:   Future     Standing Expiration Date:   6/6/2020     Order Specific Question:   Reason for exam:     Answer:   ATTENTION BACK RIGHT PELVIS AND RIGHT HIP    C-Reactive Protein     Standing Status:   Future     Standing Expiration Date:   9/6/2019    Sedimentation Rate     Standing Status:   Future     Standing Expiration Date:   9/6/2019       Dora LANCE PA-C, ATC, am scribing for and in the presence of Sujata Paulson D.O.. 6/9/2019  9:25 PM      ISujata DO, have personally seen this patient, reviewed the chart including history, and imaging if done. I personally  performed the physical exam and obtained any needed additional history. I placed orders, performed or supervised procedures and developed the treatment plan.       Electronically signed by Cristiano Arnold DO, on 6/9/2019 at 9:25 PM

## 2019-06-18 DIAGNOSIS — M51.36 DDD (DEGENERATIVE DISC DISEASE), LUMBAR: ICD-10-CM

## 2019-06-18 DIAGNOSIS — M15.9 PRIMARY OSTEOARTHRITIS INVOLVING MULTIPLE JOINTS: ICD-10-CM

## 2019-06-18 RX ORDER — HYDROCODONE BITARTRATE AND ACETAMINOPHEN 7.5; 325 MG/1; MG/1
1 TABLET ORAL EVERY 8 HOURS PRN
Qty: 90 TABLET | Refills: 0 | Status: SHIPPED | OUTPATIENT
Start: 2019-06-18 | End: 2019-07-18 | Stop reason: SDUPTHER

## 2019-06-18 NOTE — TELEPHONE ENCOUNTER
Patient requested refill     Health Maintenance   Topic Date Due    Shingles Vaccine (1 of 2) 10/10/2024 (Originally [birth year]21/1995)    TSH testing  10/18/2019    Potassium monitoring  04/22/2020    Creatinine monitoring  04/22/2020    DTaP/Tdap/Td vaccine (2 - Td) 04/04/2021    DEXA (modify frequency per FRAX score)  Completed    Flu vaccine  Completed    Pneumococcal 65+ years Vaccine  Completed             (applicable per patient's age: Cancer Screenings, Depression Screening, Fall Risk Screening, Immunizations)    LDL Cholesterol (mg/dL)   Date Value   07/27/2018 79     AST (U/L)   Date Value   04/22/2019 19     ALT (U/L)   Date Value   04/22/2019 13     BUN (mg/dL)   Date Value   04/22/2019 15      (goal A1C is < 7)   (goal LDL is <100) need 30-50% reduction from baseline     BP Readings from Last 3 Encounters:   06/06/19 130/70   05/20/19 130/60   04/18/19 138/78    (goal /80)      All Future Testing planned in [facility]:  Lab Frequency Next Occurrence   C-Reactive Protein Once 06/06/2019   Sedimentation Rate Once 06/06/2019   NM BONE SCAN 3 PHASE Once 06/06/2019       Next Visit Date:  Future Appointments   Date Time Provider Jovany Chandler   8/23/2019  3:00 PM MD carolina Grande  [facility]            Patient Active Problem List:     Essential hypertension     Fatigue     Hypothyroidism     Right foot drop     NSVT (nonsustained ventricular tachycardia) (HCC)     Sigmoid diverticulosis     Primary osteoarthritis involving multiple joints     Mixed hyperlipidemia     Coronary artery disease involving native coronary artery of native heart without angina pectoris     Esophageal reflux     Elevated liver enzymes

## 2019-06-28 RX ORDER — LEVOTHYROXINE SODIUM 0.1 MG/1
TABLET ORAL
Qty: 90 TABLET | Refills: 1 | Status: SHIPPED | OUTPATIENT
Start: 2019-06-28 | End: 2019-12-30 | Stop reason: SDUPTHER

## 2019-07-15 DIAGNOSIS — M51.36 DDD (DEGENERATIVE DISC DISEASE), LUMBAR: ICD-10-CM

## 2019-07-15 DIAGNOSIS — M15.9 PRIMARY OSTEOARTHRITIS INVOLVING MULTIPLE JOINTS: ICD-10-CM

## 2019-07-15 RX ORDER — GABAPENTIN 600 MG/1
TABLET ORAL
Qty: 180 TABLET | Refills: 1 | Status: SHIPPED | OUTPATIENT
Start: 2019-07-15 | End: 2019-08-23 | Stop reason: ALTCHOICE

## 2019-07-18 DIAGNOSIS — M15.9 PRIMARY OSTEOARTHRITIS INVOLVING MULTIPLE JOINTS: ICD-10-CM

## 2019-07-18 DIAGNOSIS — M51.36 DDD (DEGENERATIVE DISC DISEASE), LUMBAR: ICD-10-CM

## 2019-07-18 RX ORDER — HYDROCODONE BITARTRATE AND ACETAMINOPHEN 7.5; 325 MG/1; MG/1
1 TABLET ORAL EVERY 8 HOURS PRN
Qty: 90 TABLET | Refills: 0 | Status: SHIPPED | OUTPATIENT
Start: 2019-07-18 | End: 2019-08-17

## 2019-08-14 RX ORDER — SIMVASTATIN 40 MG
TABLET ORAL
Qty: 90 TABLET | Refills: 1 | Status: SHIPPED | OUTPATIENT
Start: 2019-08-14 | End: 2020-02-21 | Stop reason: SDUPTHER

## 2019-08-23 ENCOUNTER — OFFICE VISIT (OUTPATIENT)
Dept: FAMILY MEDICINE CLINIC | Age: 74
End: 2019-08-23
Payer: COMMERCIAL

## 2019-08-23 VITALS
SYSTOLIC BLOOD PRESSURE: 124 MMHG | HEART RATE: 60 BPM | WEIGHT: 152 LBS | DIASTOLIC BLOOD PRESSURE: 64 MMHG | BODY MASS INDEX: 25.29 KG/M2

## 2019-08-23 DIAGNOSIS — M51.36 DDD (DEGENERATIVE DISC DISEASE), LUMBAR: ICD-10-CM

## 2019-08-23 DIAGNOSIS — R74.8 ELEVATED LIVER ENZYMES: ICD-10-CM

## 2019-08-23 DIAGNOSIS — I10 ESSENTIAL HYPERTENSION: ICD-10-CM

## 2019-08-23 DIAGNOSIS — E78.2 MIXED HYPERLIPIDEMIA: ICD-10-CM

## 2019-08-23 DIAGNOSIS — M15.9 PRIMARY OSTEOARTHRITIS INVOLVING MULTIPLE JOINTS: ICD-10-CM

## 2019-08-23 DIAGNOSIS — M25.551 PAIN OF RIGHT HIP JOINT: Primary | ICD-10-CM

## 2019-08-23 PROCEDURE — 99214 OFFICE O/P EST MOD 30 MIN: CPT | Performed by: FAMILY MEDICINE

## 2019-08-23 RX ORDER — PREGABALIN 100 MG/1
100 CAPSULE ORAL 3 TIMES DAILY
Qty: 90 CAPSULE | Refills: 0 | Status: SHIPPED | OUTPATIENT
Start: 2019-08-23 | End: 2019-09-23 | Stop reason: SDUPTHER

## 2019-08-23 RX ORDER — HYDROCODONE BITARTRATE AND ACETAMINOPHEN 5; 325 MG/1; MG/1
1 TABLET ORAL EVERY 6 HOURS PRN
COMMUNITY
End: 2019-08-23 | Stop reason: SDUPTHER

## 2019-08-23 RX ORDER — HYDROCODONE BITARTRATE AND ACETAMINOPHEN 5; 325 MG/1; MG/1
1 TABLET ORAL EVERY 8 HOURS PRN
Qty: 90 TABLET | Refills: 0 | Status: SHIPPED | OUTPATIENT
Start: 2019-08-23 | End: 2019-10-14 | Stop reason: SDUPTHER

## 2019-08-23 ASSESSMENT — ENCOUNTER SYMPTOMS
ABDOMINAL PAIN: 0
BLOOD IN STOOL: 0
SHORTNESS OF BREATH: 0
EYES NEGATIVE: 1
CONSTIPATION: 0
BACK PAIN: 1
COUGH: 0

## 2019-08-23 NOTE — PROGRESS NOTES
[facility] PH[facility]IANS  [facility]  [address]  [facility] [address]  Dept: [phone]    8/23/2019    CHIEF COMPLAINT    Chief Complaint   Patient presents with    Hypertension       HPI    Chip Callejas is a 66 y.o. female who presents   Chief Complaint   Patient presents with    Hypertension   . Taking norco 2 daily. Taking neurontin daily but would like to switch to lyrica as she heard it is generic now. Pain in rt hip, back and leg is worse. Limits activity. Using walker for stability. Hx of high cholesterol, elevated liver enzymes which improved. Hypertension   This is a chronic problem. The current episode started more than 1 year ago. The problem is controlled. Associated symptoms include peripheral edema (rt ankle). Pertinent negatives include no chest pain, headaches, malaise/fatigue, palpitations or shortness of breath. Risk factors for coronary artery disease include dyslipidemia, post-menopausal state and sedentary lifestyle. Past treatments include beta blockers. The current treatment provides moderate improvement. Compliance problems include exercise. Hip Pain    There was no injury mechanism. The pain is present in the right hip and right thigh. The quality of the pain is described as aching. The pain is moderate. The pain has been worsening since onset. The symptoms are aggravated by palpation and weight bearing. She has tried acetaminophen (norco) for the symptoms. The treatment provided mild relief. Vitals:    08/23/19 1444   BP: 124/64   Pulse: 60   Weight: 152 lb (68.9 kg)       REVIEW OF SYSTEMS    Review of Systems   Constitutional: Positive for fatigue. Negative for fever, malaise/fatigue and unexpected weight change. HENT: Negative. Eyes: Negative. Respiratory: Negative for cough and shortness of breath. Cardiovascular: Negative for chest pain, palpitations and leg swelling.    Gastrointestinal: Negative for abdominal pain, blood in stool and constipation. Genitourinary: Negative for frequency and urgency. Musculoskeletal: Positive for arthralgias, back pain and gait problem. Skin: Negative. Neurological: Negative for dizziness and headaches. Psychiatric/Behavioral: The patient is not nervous/anxious.         PAST MEDICAL HISTORY    Past Medical History:   Diagnosis Date    Arthritis     CAD (coronary artery disease)     Esophageal reflux     Foot drop, right foot     Heart attack ([address]     patient does not know when    History of Clostridium difficile infection     treated on 17    HL (hearing loss)     Hyperlipidemia     Hypertension     Hypothyroidism     Lumbar radiculopathy     Osteoarthritis     Osteopenia     Psychiatric problem     Tremor     hand       FAMILY HISTORY    Family History   Problem Relation Age of Onset    Heart Disease Mother     Heart Disease Father     Arthritis Father     Heart Disease Brother        SOCIAL HISTORY    Social History     Socioeconomic History    Marital status:      Spouse name: None    Number of children: None    Years of education: None    Highest education level: None   Occupational History    None   Social Needs    Financial resource strain: None    Food insecurity:     Worry: None     Inability: None    Transportation needs:     Medical: None     Non-medical: None   Tobacco Use    Smoking status: Former Smoker     Packs/day: 0.00     Years: 30.00     Pack years: 0.00     Last attempt to quit: 2/10/2007     Years since quittin.5    Smokeless tobacco: Never Used   Substance and Sexual Activity    Alcohol use: No    Drug use: No    Sexual activity: None   Lifestyle    Physical activity:     Days per week: None     Minutes per session: None    Stress: None   Relationships    Social connections:     Talks on phone: None     Gets together: None     Attends [employer] service: None     Active member of club

## 2019-08-24 ENCOUNTER — HOSPITAL ENCOUNTER (OUTPATIENT)
Age: 74
Discharge: HOME | End: 2019-08-24
Payer: COMMERCIAL

## 2019-08-24 DIAGNOSIS — I10 ESSENTIAL HYPERTENSION: ICD-10-CM

## 2019-08-24 DIAGNOSIS — R74.8 ELEVATED LIVER ENZYMES: ICD-10-CM

## 2019-08-24 DIAGNOSIS — E78.2 MIXED HYPERLIPIDEMIA: ICD-10-CM

## 2019-08-24 LAB
ALBUMIN SERPL-MCNC: 4.3 G/DL (ref 3.5–5.2)
ALBUMIN/GLOBULIN RATIO: 1.2 (ref 1–2.5)
ALP BLD-CCNC: 102 U/L (ref 35–104)
ALT SERPL-CCNC: 11 U/L (ref 5–33)
ANION GAP SERPL CALCULATED.3IONS-SCNC: 14 MMOL/L (ref 9–17)
AST SERPL-CCNC: 18 U/L
BILIRUB SERPL-MCNC: 0.51 MG/DL (ref 0.3–1.2)
BUN BLDV-MCNC: 18 MG/DL (ref 8–23)
BUN/CREAT BLD: NORMAL (ref 9–20)
CALCIUM SERPL-MCNC: 9.3 MG/DL (ref 8.6–10.4)
CHLORIDE BLD-SCNC: 102 MMOL/L (ref 98–107)
CHOLESTEROL/HDL RATIO: 3.5
CHOLESTEROL: 164 MG/DL
CO2: 27 MMOL/L (ref 20–31)
CREAT SERPL-MCNC: 0.82 MG/DL (ref 0.5–0.9)
GFR AFRICAN AMERICAN: >60 ML/MIN
GFR NON-AFRICAN AMERICAN: >60 ML/MIN
GFR SERPL CREATININE-BSD FRML MDRD: NORMAL ML/MIN/{1.73_M2}
GFR SERPL CREATININE-BSD FRML MDRD: NORMAL ML/MIN/{1.73_M2}
GLUCOSE BLD-MCNC: 89 MG/DL (ref 70–99)
HDLC SERPL-MCNC: 47 MG/DL
LDL CHOLESTEROL: 84 MG/DL (ref 0–130)
POTASSIUM SERPL-SCNC: 4.2 MMOL/L (ref 3.7–5.3)
SODIUM BLD-SCNC: 143 MMOL/L (ref 135–144)
TOTAL PROTEIN: 8 G/DL (ref 6.4–8.3)
TRIGL SERPL-MCNC: 165 MG/DL
VLDLC SERPL CALC-MCNC: ABNORMAL MG/DL (ref 1–30)

## 2019-08-24 PROCEDURE — 80053 COMPREHEN METABOLIC PANEL: CPT

## 2019-08-24 PROCEDURE — 36415 COLL VENOUS BLD VENIPUNCTURE: CPT

## 2019-08-24 PROCEDURE — 80061 LIPID PANEL: CPT

## 2019-09-10 DIAGNOSIS — M51.36 DDD (DEGENERATIVE DISC DISEASE), LUMBAR: ICD-10-CM

## 2019-09-10 DIAGNOSIS — M15.9 PRIMARY OSTEOARTHRITIS INVOLVING MULTIPLE JOINTS: ICD-10-CM

## 2019-09-10 RX ORDER — GABAPENTIN 600 MG/1
TABLET ORAL
Qty: 180 TABLET | Refills: 1 | Status: SHIPPED | OUTPATIENT
Start: 2019-09-10 | End: 2019-09-26 | Stop reason: SDUPTHER

## 2019-09-23 ENCOUNTER — TELEPHONE (OUTPATIENT)
Dept: FAMILY MEDICINE CLINIC | Age: 74
End: 2019-09-23

## 2019-09-23 DIAGNOSIS — M15.9 PRIMARY OSTEOARTHRITIS INVOLVING MULTIPLE JOINTS: ICD-10-CM

## 2019-09-23 DIAGNOSIS — M25.551 PAIN OF RIGHT HIP JOINT: ICD-10-CM

## 2019-09-23 RX ORDER — PREGABALIN 150 MG/1
150 CAPSULE ORAL 3 TIMES DAILY
Qty: 90 CAPSULE | Refills: 0 | Status: SHIPPED | OUTPATIENT
Start: 2019-09-23 | End: 2019-10-24 | Stop reason: SDUPTHER

## 2019-09-26 DIAGNOSIS — M25.551 PAIN OF RIGHT HIP JOINT: ICD-10-CM

## 2019-09-26 DIAGNOSIS — M51.36 DDD (DEGENERATIVE DISC DISEASE), LUMBAR: ICD-10-CM

## 2019-09-26 DIAGNOSIS — M15.9 PRIMARY OSTEOARTHRITIS INVOLVING MULTIPLE JOINTS: ICD-10-CM

## 2019-09-26 RX ORDER — GABAPENTIN 600 MG/1
TABLET ORAL
Qty: 540 TABLET | Refills: 1 | Status: SHIPPED | OUTPATIENT
Start: 2019-09-26 | End: 2019-12-30 | Stop reason: SDUPTHER

## 2019-10-14 DIAGNOSIS — M15.9 PRIMARY OSTEOARTHRITIS INVOLVING MULTIPLE JOINTS: ICD-10-CM

## 2019-10-14 DIAGNOSIS — M51.36 DDD (DEGENERATIVE DISC DISEASE), LUMBAR: ICD-10-CM

## 2019-10-14 RX ORDER — HYDROCODONE BITARTRATE AND ACETAMINOPHEN 5; 325 MG/1; MG/1
1 TABLET ORAL EVERY 8 HOURS PRN
Qty: 90 TABLET | Refills: 0 | Status: SHIPPED | OUTPATIENT
Start: 2019-10-14 | End: 2019-11-25 | Stop reason: SDUPTHER

## 2019-10-23 DIAGNOSIS — M25.551 PAIN OF RIGHT HIP JOINT: ICD-10-CM

## 2019-10-23 DIAGNOSIS — M15.9 PRIMARY OSTEOARTHRITIS INVOLVING MULTIPLE JOINTS: ICD-10-CM

## 2019-10-24 RX ORDER — PREGABALIN 150 MG/1
150 CAPSULE ORAL 3 TIMES DAILY
Qty: 90 CAPSULE | Refills: 3 | Status: SHIPPED | OUTPATIENT
Start: 2019-10-24 | End: 2019-12-30 | Stop reason: ALTCHOICE

## 2019-11-25 DIAGNOSIS — M15.9 PRIMARY OSTEOARTHRITIS INVOLVING MULTIPLE JOINTS: ICD-10-CM

## 2019-11-25 DIAGNOSIS — M51.36 DDD (DEGENERATIVE DISC DISEASE), LUMBAR: ICD-10-CM

## 2019-11-25 RX ORDER — HYDROCODONE BITARTRATE AND ACETAMINOPHEN 5; 325 MG/1; MG/1
1 TABLET ORAL EVERY 8 HOURS PRN
Qty: 90 TABLET | Refills: 0 | Status: SHIPPED | OUTPATIENT
Start: 2019-11-25 | End: 2019-12-30 | Stop reason: SDUPTHER

## 2019-12-30 ENCOUNTER — OFFICE VISIT (OUTPATIENT)
Dept: FAMILY MEDICINE CLINIC | Age: 74
End: 2019-12-30
Payer: COMMERCIAL

## 2019-12-30 ENCOUNTER — HOSPITAL ENCOUNTER (OUTPATIENT)
Age: 74
Setting detail: SPECIMEN
Discharge: HOME | End: 2019-12-30
Payer: COMMERCIAL

## 2019-12-30 VITALS
HEART RATE: 64 BPM | DIASTOLIC BLOOD PRESSURE: 70 MMHG | BODY MASS INDEX: 25.46 KG/M2 | SYSTOLIC BLOOD PRESSURE: 130 MMHG | WEIGHT: 153 LBS

## 2019-12-30 DIAGNOSIS — E03.9 HYPOTHYROIDISM, UNSPECIFIED TYPE: ICD-10-CM

## 2019-12-30 DIAGNOSIS — M51.36 DDD (DEGENERATIVE DISC DISEASE), LUMBAR: ICD-10-CM

## 2019-12-30 DIAGNOSIS — I10 ESSENTIAL HYPERTENSION: ICD-10-CM

## 2019-12-30 DIAGNOSIS — M15.9 PRIMARY OSTEOARTHRITIS INVOLVING MULTIPLE JOINTS: Primary | ICD-10-CM

## 2019-12-30 PROCEDURE — 99214 OFFICE O/P EST MOD 30 MIN: CPT | Performed by: FAMILY MEDICINE

## 2019-12-30 PROCEDURE — 36415 COLL VENOUS BLD VENIPUNCTURE: CPT | Performed by: FAMILY MEDICINE

## 2019-12-30 RX ORDER — GABAPENTIN 600 MG/1
600 TABLET ORAL 3 TIMES DAILY
Qty: 270 TABLET | Refills: 0 | Status: SHIPPED | OUTPATIENT
Start: 2019-12-30 | End: 2020-03-17 | Stop reason: SDUPTHER

## 2019-12-30 RX ORDER — HYDROCODONE BITARTRATE AND ACETAMINOPHEN 5; 325 MG/1; MG/1
1 TABLET ORAL EVERY 8 HOURS PRN
Qty: 90 TABLET | Refills: 0 | Status: SHIPPED | OUTPATIENT
Start: 2019-12-30 | End: 2020-02-05 | Stop reason: SDUPTHER

## 2019-12-30 RX ORDER — LEVOTHYROXINE SODIUM 0.1 MG/1
TABLET ORAL
Qty: 90 TABLET | Refills: 1 | Status: SHIPPED | OUTPATIENT
Start: 2019-12-30 | End: 2020-04-02 | Stop reason: SDUPTHER

## 2019-12-30 ASSESSMENT — ENCOUNTER SYMPTOMS
BACK PAIN: 1
EYES NEGATIVE: 1
SHORTNESS OF BREATH: 0
GASTROINTESTINAL NEGATIVE: 1
ABDOMINAL PAIN: 0
CHEST TIGHTNESS: 0

## 2019-12-31 LAB
THYROXINE, FREE: 1.46 NG/DL (ref 0.93–1.7)
TSH SERPL DL<=0.05 MIU/L-ACNC: 1.33 MIU/L (ref 0.3–5)

## 2020-01-21 RX ORDER — OMEPRAZOLE 40 MG/1
CAPSULE, DELAYED RELEASE ORAL
Qty: 30 CAPSULE | Refills: 5 | Status: SHIPPED | OUTPATIENT
Start: 2020-01-21 | End: 2020-01-27 | Stop reason: SDUPTHER

## 2020-01-27 RX ORDER — OMEPRAZOLE 40 MG/1
CAPSULE, DELAYED RELEASE ORAL
Qty: 30 CAPSULE | Refills: 5 | Status: SHIPPED | OUTPATIENT
Start: 2020-01-27 | End: 2020-07-30 | Stop reason: SDUPTHER

## 2020-01-27 NOTE — TELEPHONE ENCOUNTER
Patient request she wanted it to go to [facility], [place], [facility] by [facility]    Health Maintenance   Topic Date Due    Shingles Vaccine (1 of 2) 10/10/2024 (Originally [birth year]/21/1995)    Lipid screen  08/24/2020    Potassium monitoring  08/24/2020    Creatinine monitoring  08/24/2020    TSH testing  12/30/2020    DTaP/Tdap/Td vaccine (2 - Td) 04/04/2021    DEXA (modify frequency per FRAX score)  Completed    Flu vaccine  Completed    Pneumococcal 65+ years Vaccine  Completed             (applicable per patient's age: Cancer Screenings, Depression Screening, Fall Risk Screening, Immunizations)    LDL Cholesterol (mg/dL)   Date Value   08/24/2019 84     AST (U/L)   Date Value   08/24/2019 18     ALT (U/L)   Date Value   08/24/2019 11     BUN (mg/dL)   Date Value   08/24/2019 18      (goal A1C is < 7)   (goal LDL is <100) need 30-50% reduction from baseline     BP Readings from Last 3 Encounters:   12/30/19 130/70   08/23/19 124/64   06/06/19 130/70    (goal /80)      All Future Testing planned in [facility]:  Lab Frequency Next Occurrence   NM BONE SCAN 3 PHASE Once 06/06/2019       Next Visit Date:  Future Appointments   Date Time Provider Jovany Chandler   5/1/2020  1:45 PM MD [facility]            Patient Active Problem List:     Essential hypertension     Fatigue     Hypothyroidism     Right foot drop     NSVT (nonsustained ventricular tachycardia) ([facility] [address]     Sigmoid diverticulosis     Primary osteoarthritis involving multiple joints     Mixed hyperlipidemia     Coronary artery disease involving native coronary artery of native heart without angina pectoris     Esophageal reflux     Elevated liver enzymes

## 2020-02-05 RX ORDER — HYDROCODONE BITARTRATE AND ACETAMINOPHEN 5; 325 MG/1; MG/1
1 TABLET ORAL EVERY 8 HOURS PRN
Qty: 90 TABLET | Refills: 0 | Status: SHIPPED | OUTPATIENT
Start: 2020-02-05 | End: 2021-10-01 | Stop reason: SDUPTHER

## 2020-02-13 ENCOUNTER — TELEPHONE (OUTPATIENT)
Dept: FAMILY MEDICINE CLINIC | Age: 75
End: 2020-02-13

## 2020-02-21 RX ORDER — SIMVASTATIN 40 MG
TABLET ORAL
Qty: 90 TABLET | Refills: 1 | Status: SHIPPED | OUTPATIENT
Start: 2020-02-21 | End: 2020-08-26 | Stop reason: SDUPTHER

## 2020-03-17 RX ORDER — GABAPENTIN 600 MG/1
600 TABLET ORAL 3 TIMES DAILY
Qty: 270 TABLET | Refills: 0 | Status: SHIPPED | OUTPATIENT
Start: 2020-03-17 | End: 2020-03-23

## 2020-03-17 NOTE — TELEPHONE ENCOUNTER
Patient request    Health Maintenance   Topic Date Due    Shingles Vaccine (1 of 2) 10/10/2024 (Originally 3/21/1995)    Lipid screen  08/24/2020    Potassium monitoring  08/24/2020    Creatinine monitoring  08/24/2020    TSH testing  12/30/2020    DTaP/Tdap/Td vaccine (2 - Td) 04/04/2021    DEXA (modify frequency per FRAX score)  Completed    Flu vaccine  Completed    Pneumococcal 65+ years Vaccine  Completed    Hepatitis A vaccine  Aged Out    Hepatitis B vaccine  Aged Out    Hib vaccine  Aged Out    Meningococcal (ACWY) vaccine  Aged Out             (applicable per patient's age: Cancer Screenings, Depression Screening, Fall Risk Screening, Immunizations)    LDL Cholesterol (mg/dL)   Date Value   08/24/2019 84     AST (U/L)   Date Value   08/24/2019 18     ALT (U/L)   Date Value   08/24/2019 11     BUN (mg/dL)   Date Value   08/24/2019 18      (goal A1C is < 7)   (goal LDL is <100) need 30-50% reduction from baseline     BP Readings from Last 3 Encounters:   12/30/19 130/70   08/23/19 124/64   06/06/19 130/70    (goal /80)      All Future Testing planned in [facility]:  Lab Frequency Next Occurrence   NM BONE SCAN 3 PHASE Once 06/06/2019       Next Visit Date:  Future Appointments   Date Time Provider Jovany Chandler   5/1/2020  1:45 PM MD carolina Roth  [facility]            Patient Active Problem List:     Essential hypertension     Fatigue     Hypothyroidism     Right foot drop     NSVT (nonsustained ventricular tachycardia) (HCC)     Sigmoid diverticulosis     Primary osteoarthritis involving multiple joints     Mixed hyperlipidemia     Coronary artery disease involving native coronary artery of native heart without angina pectoris     Esophageal reflux     Elevated liver enzymes

## 2020-03-23 RX ORDER — GABAPENTIN 600 MG/1
600 TABLET ORAL 3 TIMES DAILY
Qty: 270 TABLET | Refills: 0 | Status: SHIPPED | OUTPATIENT
Start: 2020-03-23 | End: 2020-05-26 | Stop reason: SDUPTHER

## 2020-04-02 RX ORDER — LEVOTHYROXINE SODIUM 0.1 MG/1
TABLET ORAL
Qty: 90 TABLET | Refills: 0 | Status: SHIPPED | OUTPATIENT
Start: 2020-04-02 | End: 2020-07-01

## 2020-04-02 NOTE — TELEPHONE ENCOUNTER
Patient request, last appt 12/30/19    Health Maintenance   Topic Date Due    Shingles Vaccine (1 of 2) 10/10/2024 (Originally 3/21/1995)    Lipid screen  08/24/2020    Potassium monitoring  08/24/2020    Creatinine monitoring  08/24/2020    TSH testing  12/30/2020    DTaP/Tdap/Td vaccine (2 - Td) 04/04/2021    DEXA (modify frequency per FRAX score)  Completed    Flu vaccine  Completed    Pneumococcal 65+ years Vaccine  Completed    Hepatitis A vaccine  Aged Out    Hepatitis B vaccine  Aged Out    Hib vaccine  Aged Out    Meningococcal (ACWY) vaccine  Aged Out             (applicable per patient's age: Cancer Screenings, Depression Screening, Fall Risk Screening, Immunizations)    LDL Cholesterol (mg/dL)   Date Value   08/24/2019 84     AST (U/L)   Date Value   08/24/2019 18     ALT (U/L)   Date Value   08/24/2019 11     BUN (mg/dL)   Date Value   08/24/2019 18      (goal A1C is < 7)   (goal LDL is <100) need 30-50% reduction from baseline     BP Readings from Last 3 Encounters:   12/30/19 130/70   08/23/19 124/64   06/06/19 130/70    (goal /80)      All Future Testing planned in [facility]:  Lab Frequency Next Occurrence   NM BONE SCAN 3 PHASE Once 06/06/2019       Next Visit Date:  Future Appointments   Date Time Provider Jovany Chandler   5/1/2020  1:45 PM MD carolina Hilton fp [facility]            Patient Active Problem List:     Essential hypertension     Fatigue     Hypothyroidism     Right foot drop     NSVT (nonsustained ventricular tachycardia) (HCC)     Sigmoid diverticulosis     Primary osteoarthritis involving multiple joints     Mixed hyperlipidemia     Coronary artery disease involving native coronary artery of native heart without angina pectoris     Esophageal reflux     Elevated liver enzymes

## 2020-05-08 ENCOUNTER — TELEPHONE (OUTPATIENT)
Dept: FAMILY MEDICINE CLINIC | Age: 75
End: 2020-05-08

## 2020-05-19 RX ORDER — ATENOLOL 25 MG/1
TABLET ORAL
Qty: 90 TABLET | Refills: 0 | Status: SHIPPED | OUTPATIENT
Start: 2020-05-19 | End: 2020-08-21

## 2020-05-19 NOTE — TELEPHONE ENCOUNTER
Patient requested refill    Health Maintenance   Topic Date Due    Annual Wellness Visit (AWV)  04/26/2020    Shingles Vaccine (1 of 2) 10/10/2024 (Originally 3/21/1993)    Lipid screen  08/24/2020    Potassium monitoring  08/24/2020    Creatinine monitoring  08/24/2020    TSH testing  12/30/2020    DTaP/Tdap/Td vaccine (2 - Td) 04/04/2021    DEXA (modify frequency per FRAX score)  Completed    Flu vaccine  Completed    Pneumococcal 65+ years Vaccine  Completed    Hepatitis A vaccine  Aged Out    Hepatitis B vaccine  Aged Out    Hib vaccine  Aged Out    Meningococcal (ACWY) vaccine  Aged Out             (applicable per patient's age: Cancer Screenings, Depression Screening, Fall Risk Screening, Immunizations)    LDL Cholesterol (mg/dL)   Date Value   08/24/2019 84     AST (U/L)   Date Value   08/24/2019 18     ALT (U/L)   Date Value   08/24/2019 11     BUN (mg/dL)   Date Value   08/24/2019 18      (goal A1C is < 7)   (goal LDL is <100) need 30-50% reduction from baseline     BP Readings from Last 3 Encounters:   12/30/19 130/70   08/23/19 124/64   06/06/19 130/70    (goal /80)      All Future Testing planned in [facility]:  Lab Frequency Next Occurrence   NM BONE SCAN 3 PHASE Once 06/06/2019       Next Visit Date:  No future appointments.          Patient Active Problem List:     Essential hypertension     Fatigue     Hypothyroidism     Right foot drop     NSVT (nonsustained ventricular tachycardia) (HCC)     Sigmoid diverticulosis     Primary osteoarthritis involving multiple joints     Mixed hyperlipidemia     Coronary artery disease involving native coronary artery of native heart without angina pectoris     Esophageal reflux     Elevated liver enzymes

## 2020-05-26 RX ORDER — GABAPENTIN 600 MG/1
600 TABLET ORAL 3 TIMES DAILY
Qty: 270 TABLET | Refills: 0 | Status: SHIPPED | OUTPATIENT
Start: 2020-05-26 | End: 2020-09-25

## 2020-07-01 RX ORDER — LEVOTHYROXINE SODIUM 0.1 MG/1
TABLET ORAL
Qty: 90 TABLET | Refills: 0 | Status: SHIPPED | OUTPATIENT
Start: 2020-07-01 | End: 2020-09-25

## 2020-07-30 RX ORDER — OMEPRAZOLE 40 MG/1
CAPSULE, DELAYED RELEASE ORAL
Qty: 90 CAPSULE | Refills: 1 | Status: SHIPPED | OUTPATIENT
Start: 2020-07-30 | End: 2021-03-01

## 2020-08-26 RX ORDER — SIMVASTATIN 40 MG
TABLET ORAL
Qty: 90 TABLET | Refills: 1 | Status: SHIPPED | OUTPATIENT
Start: 2020-08-26 | End: 2021-02-21

## 2020-08-26 NOTE — TELEPHONE ENCOUNTER
Steph Bryan is calling to request a refill on the following medication(s):    Last Visit Date (If Applicable):  22/33/2513    Next Visit Date:    Visit date not found    Medication Request:  Requested Prescriptions     Pending Prescriptions Disp Refills    simvastatin (ZOCOR) 40 MG tablet 90 tablet 1     Sig: TAKE 1 TABLET BY MOUTH EVERY DAY    diclofenac sodium (VOLTAREN) 1 % GEL 1 Tube 5     Sig: Apply 4 g topically every 4 hours as needed for Pain

## 2020-08-27 ENCOUNTER — TELEPHONE (OUTPATIENT)
Dept: FAMILY MEDICINE CLINIC | Age: 75
End: 2020-08-27

## 2021-01-02 ENCOUNTER — APPOINTMENT (OUTPATIENT)
Dept: GENERAL RADIOLOGY | Age: 76
End: 2021-01-02
Payer: COMMERCIAL

## 2021-01-02 ENCOUNTER — HOSPITAL ENCOUNTER (OUTPATIENT)
Age: 76
Setting detail: OBSERVATION
Discharge: HOME | End: 2021-01-03
Attending: EMERGENCY MEDICINE | Admitting: INTERNAL MEDICINE
Payer: COMMERCIAL

## 2021-01-02 DIAGNOSIS — I25.119 CORONARY ARTERY DISEASE INVOLVING NATIVE CORONARY ARTERY OF NATIVE HEART WITH ANGINA PECTORIS (HCC): ICD-10-CM

## 2021-01-02 DIAGNOSIS — R07.9 CHEST PAIN, UNSPECIFIED TYPE: Primary | ICD-10-CM

## 2021-01-02 LAB
-: ABNORMAL
ABSOLUTE EOS #: 0.23 K/UL (ref 0–0.44)
ABSOLUTE IMMATURE GRANULOCYTE: 0.06 K/UL (ref 0–0.3)
ABSOLUTE LYMPH #: 1.85 K/UL (ref 1.1–3.7)
ABSOLUTE MONO #: 0.49 K/UL (ref 0.1–1.2)
AMORPHOUS: ABNORMAL
ANION GAP SERPL CALCULATED.3IONS-SCNC: 11 MMOL/L (ref 9–17)
BACTERIA: ABNORMAL
BASOPHILS # BLD: 1 % (ref 0–2)
BASOPHILS ABSOLUTE: 0.1 K/UL (ref 0–0.2)
BILIRUBIN URINE: NEGATIVE
BUN BLDV-MCNC: 20 MG/DL (ref 8–23)
BUN/CREAT BLD: 30 (ref 9–20)
CALCIUM SERPL-MCNC: 9.4 MG/DL (ref 8.6–10.4)
CASTS UA: ABNORMAL /LPF
CASTS UA: ABNORMAL /LPF
CHLORIDE BLD-SCNC: 102 MMOL/L (ref 98–107)
CO2: 25 MMOL/L (ref 20–31)
COLOR: ABNORMAL
COMMENT UA: ABNORMAL
CREAT SERPL-MCNC: 0.66 MG/DL (ref 0.5–0.9)
CRYSTALS, UA: ABNORMAL /HPF
DIFFERENTIAL TYPE: ABNORMAL
EOSINOPHILS RELATIVE PERCENT: 2 % (ref 1–4)
EPITHELIAL CELLS UA: ABNORMAL /HPF (ref 0–5)
GFR AFRICAN AMERICAN: >60 ML/MIN
GFR NON-AFRICAN AMERICAN: >60 ML/MIN
GFR SERPL CREATININE-BSD FRML MDRD: ABNORMAL ML/MIN/{1.73_M2}
GFR SERPL CREATININE-BSD FRML MDRD: ABNORMAL ML/MIN/{1.73_M2}
GLUCOSE BLD-MCNC: 138 MG/DL (ref 70–99)
GLUCOSE URINE: NEGATIVE
HCT VFR BLD CALC: 39.5 % (ref 36.3–47.1)
HEMOGLOBIN: 12.7 G/DL (ref 11.9–15.1)
IMMATURE GRANULOCYTES: 0 %
INR BLD: 1
KETONES, URINE: ABNORMAL
LEUKOCYTE ESTERASE, URINE: NEGATIVE
LYMPHOCYTES # BLD: 13 % (ref 24–43)
MCH RBC QN AUTO: 31.8 PG (ref 25.2–33.5)
MCHC RBC AUTO-ENTMCNC: 32.2 G/DL (ref 28.4–34.8)
MCV RBC AUTO: 98.8 FL (ref 82.6–102.9)
MONOCYTES # BLD: 3 % (ref 3–12)
MUCUS: ABNORMAL
MYOGLOBIN: 41 NG/ML (ref 25–58)
MYOGLOBIN: 43 NG/ML (ref 25–58)
NITRITE, URINE: NEGATIVE
NRBC AUTOMATED: 0 PER 100 WBC
OTHER OBSERVATIONS UA: ABNORMAL
PARTIAL THROMBOPLASTIN TIME: 32.4 SEC (ref 23.9–33.8)
PDW BLD-RTO: 14 % (ref 11.8–14.4)
PH UA: 5 (ref 5–8)
PLATELET # BLD: 316 K/UL (ref 138–453)
PLATELET ESTIMATE: ABNORMAL
PMV BLD AUTO: 9.1 FL (ref 8.1–13.5)
POTASSIUM SERPL-SCNC: 3.8 MMOL/L (ref 3.7–5.3)
PROTEIN UA: ABNORMAL
PROTHROMBIN TIME: 13.4 SEC (ref 11.5–14.2)
RBC # BLD: 4 M/UL (ref 3.95–5.11)
RBC # BLD: ABNORMAL 10*6/UL
RBC UA: ABNORMAL /HPF (ref 0–2)
RENAL EPITHELIAL, UA: ABNORMAL /HPF
SEG NEUTROPHILS: 81 % (ref 36–65)
SEGMENTED NEUTROPHILS ABSOLUTE COUNT: 11.98 K/UL (ref 1.5–8.1)
SODIUM BLD-SCNC: 138 MMOL/L (ref 135–144)
SPECIFIC GRAVITY UA: 1.05 (ref 1–1.03)
TRICHOMONAS: ABNORMAL
TROPONIN INTERP: ABNORMAL
TROPONIN T: ABNORMAL NG/ML
TROPONIN, HIGH SENSITIVITY: 24 NG/L (ref 0–14)
TROPONIN, HIGH SENSITIVITY: 24 NG/L (ref 0–14)
TROPONIN, HIGH SENSITIVITY: 29 NG/L (ref 0–14)
TURBIDITY: ABNORMAL
URINE HGB: NEGATIVE
UROBILINOGEN, URINE: NORMAL
WBC # BLD: 14.7 K/UL (ref 3.5–11.3)
WBC # BLD: ABNORMAL 10*3/UL
WBC UA: ABNORMAL /HPF (ref 0–5)
YEAST: ABNORMAL

## 2021-01-02 PROCEDURE — 2580000003 HC RX 258: Performed by: NURSE PRACTITIONER

## 2021-01-02 PROCEDURE — 96376 TX/PRO/DX INJ SAME DRUG ADON: CPT

## 2021-01-02 PROCEDURE — 96374 THER/PROPH/DIAG INJ IV PUSH: CPT

## 2021-01-02 PROCEDURE — 93005 ELECTROCARDIOGRAM TRACING: CPT | Performed by: EMERGENCY MEDICINE

## 2021-01-02 PROCEDURE — 85730 THROMBOPLASTIN TIME PARTIAL: CPT

## 2021-01-02 PROCEDURE — 81001 URINALYSIS AUTO W/SCOPE: CPT

## 2021-01-02 PROCEDURE — 80048 BASIC METABOLIC PNL TOTAL CA: CPT

## 2021-01-02 PROCEDURE — 85610 PROTHROMBIN TIME: CPT

## 2021-01-02 PROCEDURE — 71045 X-RAY EXAM CHEST 1 VIEW: CPT

## 2021-01-02 PROCEDURE — 6360000002 HC RX W HCPCS: Performed by: EMERGENCY MEDICINE

## 2021-01-02 PROCEDURE — 84484 ASSAY OF TROPONIN QUANT: CPT

## 2021-01-02 PROCEDURE — 87086 URINE CULTURE/COLONY COUNT: CPT

## 2021-01-02 PROCEDURE — 99285 EMERGENCY DEPT VISIT HI MDM: CPT

## 2021-01-02 PROCEDURE — 36415 COLL VENOUS BLD VENIPUNCTURE: CPT

## 2021-01-02 PROCEDURE — 83874 ASSAY OF MYOGLOBIN: CPT

## 2021-01-02 PROCEDURE — G0378 HOSPITAL OBSERVATION PER HR: HCPCS

## 2021-01-02 PROCEDURE — 99220 PR INITIAL OBSERVATION CARE/DAY 70 MINUTES: CPT | Performed by: NURSE PRACTITIONER

## 2021-01-02 PROCEDURE — 6360000002 HC RX W HCPCS: Performed by: NURSE PRACTITIONER

## 2021-01-02 PROCEDURE — 85025 COMPLETE CBC W/AUTO DIFF WBC: CPT

## 2021-01-02 PROCEDURE — 6370000000 HC RX 637 (ALT 250 FOR IP): Performed by: EMERGENCY MEDICINE

## 2021-01-02 RX ORDER — NITROGLYCERIN 0.4 MG/1
0.4 TABLET SUBLINGUAL EVERY 5 MIN PRN
Status: DISCONTINUED | OUTPATIENT
Start: 2021-01-02 | End: 2021-01-03 | Stop reason: HOSPADM

## 2021-01-02 RX ORDER — GABAPENTIN 600 MG/1
600 TABLET ORAL 3 TIMES DAILY
Status: DISCONTINUED | OUTPATIENT
Start: 2021-01-02 | End: 2021-01-03 | Stop reason: HOSPADM

## 2021-01-02 RX ORDER — MORPHINE SULFATE 2 MG/ML
2 INJECTION, SOLUTION INTRAMUSCULAR; INTRAVENOUS
Status: DISCONTINUED | OUTPATIENT
Start: 2021-01-02 | End: 2021-01-03 | Stop reason: HOSPADM

## 2021-01-02 RX ORDER — ACETAMINOPHEN 325 MG/1
650 TABLET ORAL EVERY 6 HOURS PRN
Status: DISCONTINUED | OUTPATIENT
Start: 2021-01-02 | End: 2021-01-03 | Stop reason: HOSPADM

## 2021-01-02 RX ORDER — POTASSIUM CHLORIDE 20 MEQ/1
40 TABLET, EXTENDED RELEASE ORAL PRN
Status: DISCONTINUED | OUTPATIENT
Start: 2021-01-02 | End: 2021-01-03 | Stop reason: HOSPADM

## 2021-01-02 RX ORDER — POTASSIUM CHLORIDE 7.45 MG/ML
10 INJECTION INTRAVENOUS PRN
Status: DISCONTINUED | OUTPATIENT
Start: 2021-01-02 | End: 2021-01-03 | Stop reason: HOSPADM

## 2021-01-02 RX ORDER — ATENOLOL 25 MG/1
1 TABLET ORAL DAILY
Status: DISCONTINUED | OUTPATIENT
Start: 2021-01-02 | End: 2021-01-03 | Stop reason: HOSPADM

## 2021-01-02 RX ORDER — SODIUM CHLORIDE 0.9 % (FLUSH) 0.9 %
10 SYRINGE (ML) INJECTION PRN
Status: DISCONTINUED | OUTPATIENT
Start: 2021-01-02 | End: 2021-01-03 | Stop reason: HOSPADM

## 2021-01-02 RX ORDER — ASPIRIN 325 MG
325 TABLET ORAL 2 TIMES DAILY
Status: DISCONTINUED | OUTPATIENT
Start: 2021-01-02 | End: 2021-01-03 | Stop reason: HOSPADM

## 2021-01-02 RX ORDER — MORPHINE SULFATE 4 MG/ML
4 INJECTION, SOLUTION INTRAMUSCULAR; INTRAVENOUS
Status: DISCONTINUED | OUTPATIENT
Start: 2021-01-02 | End: 2021-01-03 | Stop reason: HOSPADM

## 2021-01-02 RX ORDER — ONDANSETRON 2 MG/ML
4 INJECTION INTRAMUSCULAR; INTRAVENOUS EVERY 6 HOURS PRN
Status: DISCONTINUED | OUTPATIENT
Start: 2021-01-02 | End: 2021-01-03 | Stop reason: HOSPADM

## 2021-01-02 RX ORDER — ASPIRIN 81 MG/1
324 TABLET, CHEWABLE ORAL ONCE
Status: COMPLETED | OUTPATIENT
Start: 2021-01-02 | End: 2021-01-02

## 2021-01-02 RX ORDER — NITROGLYCERIN 0.4 MG/1
0.4 TABLET SUBLINGUAL EVERY 5 MIN PRN
Status: COMPLETED | OUTPATIENT
Start: 2021-01-02 | End: 2021-01-02

## 2021-01-02 RX ORDER — MORPHINE SULFATE 4 MG/ML
4 INJECTION, SOLUTION INTRAMUSCULAR; INTRAVENOUS ONCE
Status: COMPLETED | OUTPATIENT
Start: 2021-01-02 | End: 2021-01-02

## 2021-01-02 RX ORDER — ACETAMINOPHEN 650 MG/1
650 SUPPOSITORY RECTAL EVERY 6 HOURS PRN
Status: DISCONTINUED | OUTPATIENT
Start: 2021-01-02 | End: 2021-01-03 | Stop reason: HOSPADM

## 2021-01-02 RX ORDER — MAGNESIUM SULFATE 1 G/100ML
1 INJECTION INTRAVENOUS PRN
Status: DISCONTINUED | OUTPATIENT
Start: 2021-01-02 | End: 2021-01-03 | Stop reason: HOSPADM

## 2021-01-02 RX ORDER — PANTOPRAZOLE SODIUM 40 MG/1
40 TABLET, DELAYED RELEASE ORAL
Status: DISCONTINUED | OUTPATIENT
Start: 2021-01-03 | End: 2021-01-03 | Stop reason: HOSPADM

## 2021-01-02 RX ORDER — ATORVASTATIN CALCIUM 20 MG/1
20 TABLET, FILM COATED ORAL DAILY
Status: DISCONTINUED | OUTPATIENT
Start: 2021-01-02 | End: 2021-01-03 | Stop reason: HOSPADM

## 2021-01-02 RX ORDER — SODIUM CHLORIDE 0.9 % (FLUSH) 0.9 %
10 SYRINGE (ML) INJECTION EVERY 12 HOURS SCHEDULED
Status: DISCONTINUED | OUTPATIENT
Start: 2021-01-02 | End: 2021-01-03 | Stop reason: HOSPADM

## 2021-01-02 RX ORDER — PROMETHAZINE HYDROCHLORIDE 12.5 MG/1
12.5 TABLET ORAL EVERY 6 HOURS PRN
Status: DISCONTINUED | OUTPATIENT
Start: 2021-01-02 | End: 2021-01-03 | Stop reason: HOSPADM

## 2021-01-02 RX ORDER — LEVOTHYROXINE SODIUM 0.1 MG/1
1 TABLET ORAL DAILY
Status: DISCONTINUED | OUTPATIENT
Start: 2021-01-02 | End: 2021-01-03 | Stop reason: HOSPADM

## 2021-01-02 RX ADMIN — NITROGLYCERIN 0.4 MG: 0.4 TABLET, ORALLY DISINTEGRATING SUBLINGUAL at 18:22

## 2021-01-02 RX ADMIN — LIDOCAINE HYDROCHLORIDE: 20 SOLUTION ORAL; TOPICAL at 17:31

## 2021-01-02 RX ADMIN — MORPHINE SULFATE 4 MG: 4 INJECTION, SOLUTION INTRAMUSCULAR; INTRAVENOUS at 18:56

## 2021-01-02 RX ADMIN — MORPHINE SULFATE 4 MG: 4 INJECTION, SOLUTION INTRAMUSCULAR; INTRAVENOUS at 22:27

## 2021-01-02 RX ADMIN — NITROGLYCERIN 0.4 MG: 0.4 TABLET, ORALLY DISINTEGRATING SUBLINGUAL at 16:56

## 2021-01-02 RX ADMIN — ASPIRIN 324 MG: 81 TABLET, CHEWABLE ORAL at 16:47

## 2021-01-02 RX ADMIN — Medication 10 ML: at 22:27

## 2021-01-02 RX ADMIN — NITROGLYCERIN 0.4 MG: 0.4 TABLET, ORALLY DISINTEGRATING SUBLINGUAL at 17:08

## 2021-01-02 ASSESSMENT — PAIN SCALES - GENERAL
PAINLEVEL_OUTOF10: 10
PAINLEVEL_OUTOF10: 5
PAINLEVEL_OUTOF10: 10
PAINLEVEL_OUTOF10: 10
PAINLEVEL_OUTOF10: 6
PAINLEVEL_OUTOF10: 10

## 2021-01-02 ASSESSMENT — ENCOUNTER SYMPTOMS
EYE DISCHARGE: 0
COUGH: 0
SHORTNESS OF BREATH: 0
DIARRHEA: 0
ABDOMINAL PAIN: 0
FACIAL SWELLING: 0
VOMITING: 0
COLOR CHANGE: 0
CONSTIPATION: 0
EYE REDNESS: 0

## 2021-01-02 ASSESSMENT — PAIN DESCRIPTION - FREQUENCY: FREQUENCY: CONTINUOUS

## 2021-01-02 ASSESSMENT — PAIN - FUNCTIONAL ASSESSMENT: PAIN_FUNCTIONAL_ASSESSMENT: PREVENTS OR INTERFERES SOME ACTIVE ACTIVITIES AND ADLS

## 2021-01-02 ASSESSMENT — PAIN DESCRIPTION - DESCRIPTORS: DESCRIPTORS: DISCOMFORT;BURNING

## 2021-01-02 ASSESSMENT — PAIN DESCRIPTION - ONSET: ONSET: ON-GOING

## 2021-01-02 ASSESSMENT — PAIN DESCRIPTION - ORIENTATION: ORIENTATION: MID

## 2021-01-03 VITALS
WEIGHT: 138.89 LBS | BODY MASS INDEX: 23.11 KG/M2 | DIASTOLIC BLOOD PRESSURE: 45 MMHG | TEMPERATURE: 97.9 F | RESPIRATION RATE: 16 BRPM | SYSTOLIC BLOOD PRESSURE: 123 MMHG | OXYGEN SATURATION: 92 % | HEART RATE: 58 BPM

## 2021-01-03 PROBLEM — D64.9 ANEMIA, NORMOCYTIC NORMOCHROMIC: Status: ACTIVE | Noted: 2021-01-03

## 2021-01-03 PROBLEM — R77.8 TROPONIN LEVEL ELEVATED: Status: ACTIVE | Noted: 2021-01-03

## 2021-01-03 PROBLEM — R79.89 TROPONIN LEVEL ELEVATED: Status: ACTIVE | Noted: 2021-01-03

## 2021-01-03 PROBLEM — R74.01 TRANSAMINASEMIA: Status: ACTIVE | Noted: 2021-01-03

## 2021-01-03 LAB
ALBUMIN SERPL-MCNC: 3.7 G/DL (ref 3.5–5.2)
ALBUMIN/GLOBULIN RATIO: ABNORMAL (ref 1–2.5)
ALP BLD-CCNC: 86 U/L (ref 35–104)
ALT SERPL-CCNC: 39 U/L (ref 5–33)
ANION GAP SERPL CALCULATED.3IONS-SCNC: 9 MMOL/L (ref 9–17)
AST SERPL-CCNC: 51 U/L
BILIRUB SERPL-MCNC: 1.25 MG/DL (ref 0.3–1.2)
BUN BLDV-MCNC: 18 MG/DL (ref 8–23)
BUN/CREAT BLD: 33 (ref 9–20)
CALCIUM SERPL-MCNC: 9.2 MG/DL (ref 8.6–10.4)
CHLORIDE BLD-SCNC: 103 MMOL/L (ref 98–107)
CHOLESTEROL/HDL RATIO: 2.5
CHOLESTEROL: 138 MG/DL
CO2: 26 MMOL/L (ref 20–31)
CREAT SERPL-MCNC: 0.55 MG/DL (ref 0.5–0.9)
GFR AFRICAN AMERICAN: >60 ML/MIN
GFR NON-AFRICAN AMERICAN: >60 ML/MIN
GFR SERPL CREATININE-BSD FRML MDRD: ABNORMAL ML/MIN/{1.73_M2}
GFR SERPL CREATININE-BSD FRML MDRD: ABNORMAL ML/MIN/{1.73_M2}
GLUCOSE BLD-MCNC: 92 MG/DL (ref 70–99)
HCT VFR BLD CALC: 36.8 % (ref 36.3–47.1)
HDLC SERPL-MCNC: 55 MG/DL
HEMOGLOBIN: 11.6 G/DL (ref 11.9–15.1)
LDL CHOLESTEROL: 65 MG/DL (ref 0–130)
MAGNESIUM: 1.8 MG/DL (ref 1.6–2.6)
MAGNESIUM: 1.9 MG/DL (ref 1.6–2.6)
MCH RBC QN AUTO: 31.5 PG (ref 25.2–33.5)
MCHC RBC AUTO-ENTMCNC: 31.5 G/DL (ref 28.4–34.8)
MCV RBC AUTO: 100 FL (ref 82.6–102.9)
NRBC AUTOMATED: 0 PER 100 WBC
PDW BLD-RTO: 14.1 % (ref 11.8–14.4)
PLATELET # BLD: 274 K/UL (ref 138–453)
PMV BLD AUTO: 9.3 FL (ref 8.1–13.5)
POTASSIUM SERPL-SCNC: 3.7 MMOL/L (ref 3.7–5.3)
POTASSIUM SERPL-SCNC: 3.8 MMOL/L (ref 3.7–5.3)
RBC # BLD: 3.68 M/UL (ref 3.95–5.11)
SODIUM BLD-SCNC: 138 MMOL/L (ref 135–144)
TOTAL PROTEIN: 7 G/DL (ref 6.4–8.3)
TRIGL SERPL-MCNC: 92 MG/DL
TROPONIN INTERP: ABNORMAL
TROPONIN T: ABNORMAL NG/ML
TROPONIN, HIGH SENSITIVITY: 22 NG/L (ref 0–14)
TSH SERPL DL<=0.05 MIU/L-ACNC: 1.27 MIU/L (ref 0.3–5)
VLDLC SERPL CALC-MCNC: NORMAL MG/DL (ref 1–30)
WBC # BLD: 10 K/UL (ref 3.5–11.3)

## 2021-01-03 PROCEDURE — 93005 ELECTROCARDIOGRAM TRACING: CPT | Performed by: NURSE PRACTITIONER

## 2021-01-03 PROCEDURE — 80053 COMPREHEN METABOLIC PANEL: CPT

## 2021-01-03 PROCEDURE — 84443 ASSAY THYROID STIM HORMONE: CPT

## 2021-01-03 PROCEDURE — 36415 COLL VENOUS BLD VENIPUNCTURE: CPT

## 2021-01-03 PROCEDURE — 96372 THER/PROPH/DIAG INJ SC/IM: CPT

## 2021-01-03 PROCEDURE — 99226 PR SBSQ OBSERVATION CARE/DAY 35 MINUTES: CPT | Performed by: INTERNAL MEDICINE

## 2021-01-03 PROCEDURE — 80061 LIPID PANEL: CPT

## 2021-01-03 PROCEDURE — 6370000000 HC RX 637 (ALT 250 FOR IP): Performed by: NURSE PRACTITIONER

## 2021-01-03 PROCEDURE — 6360000002 HC RX W HCPCS: Performed by: NURSE PRACTITIONER

## 2021-01-03 PROCEDURE — G0378 HOSPITAL OBSERVATION PER HR: HCPCS

## 2021-01-03 PROCEDURE — 84484 ASSAY OF TROPONIN QUANT: CPT

## 2021-01-03 PROCEDURE — 85027 COMPLETE CBC AUTOMATED: CPT

## 2021-01-03 PROCEDURE — 83735 ASSAY OF MAGNESIUM: CPT

## 2021-01-03 PROCEDURE — 84132 ASSAY OF SERUM POTASSIUM: CPT

## 2021-01-03 RX ORDER — NITROGLYCERIN 0.4 MG/1
TABLET SUBLINGUAL
Qty: 25 TABLET | Refills: 3 | Status: SHIPPED | OUTPATIENT
Start: 2021-01-03 | End: 2022-04-05 | Stop reason: ALTCHOICE

## 2021-01-03 RX ADMIN — ENOXAPARIN SODIUM 40 MG: 100 INJECTION SUBCUTANEOUS at 09:06

## 2021-01-03 RX ADMIN — ASPIRIN 325 MG: 325 TABLET, COATED ORAL at 09:06

## 2021-01-03 ASSESSMENT — ENCOUNTER SYMPTOMS
NAUSEA: 0
CONSTIPATION: 0
BLOOD IN STOOL: 0
SHORTNESS OF BREATH: 1
ABDOMINAL PAIN: 0
COUGH: 0
DIARRHEA: 0
VOMITING: 0
BACK PAIN: 0
COLOR CHANGE: 0
WHEEZING: 0
SHORTNESS OF BREATH: 0

## 2021-01-03 NOTE — PLAN OF CARE
Problem: Falls - Risk of:  Goal: Will remain free from falls  Description: Will remain free from falls  1/3/2021 1135 by Doc Narayan RN  Outcome: Ongoing  1/3/2021 0214 by Sophia Nyhan, RN  Outcome: Ongoing  Note: Siderails up x 2  Hourly rounding  Call light in reach  Instructed to call for assist before attempting out of bed.   Remains free from falls and accidental injury at this time   Floor free from obstacles  Bed is locked and in lowest position  Adequate lighting provided  Bed alarm on, Red Falling star and Stay with Me signs posted      Goal: Absence of physical injury  Description: Absence of physical injury  1/3/2021 1135 by Doc Narayan RN  Outcome: Ongoing  1/3/2021 0214 by Sophia Nyhan, RN  Outcome: Ongoing

## 2021-01-03 NOTE — PROGRESS NOTES
Patient admitted to room  2024  From ER via stretcher with belongings. VSS  Patient oriented to room and call light. Assessment as charted. Admission database done. Orders and plan of care reviewed. Call light in reach. Will continue to monitor. Patient is a former smoker and smoked for 30 years. Patient quit  In 2007. Smoking cessation education given.

## 2021-01-03 NOTE — DISCHARGE SUMMARY
[facility]    Office: Kaiden Hampton, DO, Dewey Wilks DO, Camilo Pittman, DO, Yoandy Haines, DO, Mona Richards MD, David Gupta MD, Sean Steele MD, Monica Meade MD, Merritt Simmons MD, Cami Coulter MD, Leopold Pita, MD, Lorenzo Haines MD, Jeremy Mary MD, Fam De Dios, DO, Edgardo Burgos MD, Turner Thao MD, Ever Right, DO, Alida Live MD,  Jamshid Hickey DO, Alvino Bae MD, Anastasia Muñoz MD, Karlyn Leventhal, CNP, [facility], CNP, Nicolette Schwartz, CNP, Jaja Crum, CNS, Cyndy Plunkett, CNP, Anand Razo, CNP, Tori Flor, CNP, Manas Holloway, CNP, Rik Lynch, CNP, Princess Andrea PA-C, Farhan Cuenca, [place], Kriss Simpson, CNP, Severo Spore, CNP, Melba Whitney, CNP, Rose Mary Bright, CNP, Mahesh Harry, [address]    Discharge Summary    Patient ID: Luis Ricketts  :  1945   MRN: [id number]     ACCOUNT:  [de-identified]   Patient's PCP: Vandana Rodriguez MD  Admit Date: 2021   Discharge Date: 1/3/2021    Discharge Physician: Madhav Hdz DO     The patient was seen and examined on day of discharge and this discharge summary is in conjunction with any daily progress note from day of discharge.     Active Discharge Diagnoses:     Primary Problem  Chest pain      Hospital Problems  Active Hospital Problems    Diagnosis Date Noted    Troponin level elevated [R77.8] 2021    Transaminasemia [R74.01] 2021    Anemia, normocytic normochromic [D64.9] 2021    Chest pain [R07.9]     Elevated liver enzymes [R74.8] 2018    Esophageal reflux [K21.9]     Mixed hyperlipidemia [E78.2]     Coronary artery disease involving native coronary artery of native heart with angina pectoris ([address] [I25.119]     Acquired hypothyroidism [E03.9]     Essential hypertension [I10]          Hospital Course:     Brief History:  As documented in the medical record:  \"Ally Valdez is a 75 y.o. Non-/non  female who presents with Chest Pain and Gastroesophageal Reflux   and is admitted to the hospital for the management of Chest pain.     The patient reports to the hospital with complaint of epigastric pain and chest pain. She states her pain started this afternoon after she ate sushi and has persisted throughout the day. She also reports that she woke up this morning with right arm pain that has resolved. She reports her pain was originally 10/10, severe and dull. She states her pain is 3/10 at time of assessment and that the morphine she was given helped alleviated her pain. She was given GI cocktail and sublingual nitroglycerine in the ER without relief. She does endorse some chronic shortness of breath with activity. She denies fever, chills, nausea or vomiting. No additional symptomology or modifying factors. She has past medical history that includes MI, hypertension, dyslipidemia, hypothyroidism, right foot drop and neuropathy.      She sees Dr. Luz Liu with cardiology outpatient.      High sensitivity troponin 29, 24, 24.      EKG interpretation per ER physician: [facility] on my interpretation shows no acute finding\"     CXR shows stable chest x-ray including biapical pleuroparenchymal scarring from prior inflammatory/granulomatous disease.   No acute cardiopulmonary disease.      Echo 7/27/2018  Summary  Left ventricle is normal in size and function. Estimated LV EF 50-55%. Septal mild hypokinesis  Left atrium is at upper limits of normal.  No significant valvular regurgitation or stenosis seen. No pericardial effusion seen.   Normal aortic root dimension.     Lexiscan stress test 7/27/2018  100% max predicted heart rate:  145%  85% max predicted heart rate:  123%  Resting heart rate:  68  Maximum heart rate achieved:  101  Duration:  0.40  Resting BP:  130/61  Peak BP:  140/72  Peak Double Product: 10,080  % of Age Predicted Maximum:  69  METS:  1.0  Medications Given: 0.4 mg stratification: Intermediate risk.  LVEF 41%   The patient reports that her \"stress test is always abnormal \"     Results for Mynor Henry (MRN [id number]) as of 1/3/2021 11:04    Ref. Range 1/2/2021 16:47 1/2/2021 18:00 1/2/2021 20:56 1/3/2021 00:43   Troponin, High Sensitivity Latest Ref Range: 0 - 14 ng/L 29 (H) 24 (H) 24 (H) 22 (H)      EKG:  Sinus bradycardia with occasional Premature ventricular complexes  Left ventricular hypertrophy with repolarization abnormality  Abnormal ECG  When compared with ECG of 03-JAN-2021 01:37, (unconfirmed)  No significant change was found      Abdominal ultrasound 2018:  FINDINGS:   Study somewhat limited technically due to patient inability to suspend   respirations.       LIVER:  The liver demonstrates normal echogenicity without evidence of   intrahepatic biliary ductal dilatation.       BILIARY SYSTEM:  Gallbladder is unremarkable without evidence of   pericholecystic fluid, wall thickening or stones.  Negative sonographic   Prakash's sign.       Common bile duct is within normal limits measuring 4 mm.       RIGHT KIDNEY: The right kidney is grossly unremarkable without evidence of   hydronephrosis.       PANCREAS:  Visualized portions of the pancreas are unremarkable.             Discharge plan:      The initial plan included  Serial EKG and enzymes  Aspirin  Atenolol  Lipitor  Reflux precautions  PPI  Stress test     The patient's status and plan have been discussed with the patient and son at the bedside   They are both convinced that the patient's symptoms are due to GERD, precipitated by eating sushi, not Angina  They both are requesting discharge despite my concerns that underlying ischemia cannot be totally excluded (with Trop elevation)  They do not want to stay for stress test tomorrow  They will sign out AMA, understanding risks including death  I have asked them to F/U with Dr Chapito Kelly test ordered  Suggest outpatient work-up for abnormal LFTs, GERD  Anemia w/u on outpatient basis is suggested    Thyroid replacement titrated to normalize thyroid hormones     The patient was instructed to follow up with their PCP, Lawrence Stokes MD in one week     Discharge Procedure Orders   Stress test, myoview   Standing Status: Future Standing Exp. Date: 02/03/21     Order Specific Question Answer Comments   Reason for Exam? Chest pain        Significant Diagnostic Studies:     Labs / Micro:  Labs:  Hematology:  Recent Labs     01/02/21 1647 01/03/21  0633   WBC 14.7* 10.0   RBC 4.00 3.68*   HGB 12.7 11.6*   HCT 39.5 36.8   MCV 98.8 100.0   MCH 31.8 31.5   MCHC 32.2 31.5   RDW 14.0 14.1    274   MPV 9.1 9.3   INR 1.0  --      Chemistry:  Recent Labs     01/02/21 1647 01/02/21  1800 01/02/21 2056 01/03/21  0043 01/03/21  0633     --   --   --  138   K 3.8  --   --  3.8 3.7     --   --   --  103   CO2 25  --   --   --  26   GLUCOSE 138*  --   --   --  92   BUN 20  --   --   --  18   CREATININE 0.66  --   --   --  0.55   MG  --   --   --  1.8 1.9   ANIONGAP 11  --   --   --  9   LABGLOM >60  --   --   --  >60   GFRAA >60  --   --   --  >60   CALCIUM 9.4  --   --   --  9.2   TROPHS 29* 24* 24* 22*  --    MYOGLOBIN 41 43  --   --   --      Recent Labs     01/03/21  0633   PROT 7.0   LABALBU 3.7   TSH 1.27   AST 51*   ALT 39*   ALKPHOS 86   BILITOT 1.25*   CHOL 138   HDL 55   LDLCHOLESTEROL 65   CHOLHDLRATIO 2.5   TRIG 92   VLDL NOT REPORTED         Radiology:    Xr Chest Portable    Result Date: 1/2/2021  EXAMINATION: ONE XRAY VIEW OF THE CHEST 1/2/2021 4:49 pm COMPARISON: 07/01/2018 HISTORY: ORDERING SYSTEM PROVIDED HISTORY: Chest Pain TECHNOLOGIST PROVIDED HISTORY: Chest Pain Reason for Exam: Chest pain, gastroesophageal reflux Acuity: Acute Type of Exam: Initial FINDINGS: Stable biapical pleuroparenchymal scarring from prior inflammatory/granulomatous disease. There is slight retraction of the ken superiorly.   Diffuse prominence of interstitial lung markings. No acute airspace disease. No pneumothorax or pleural effusion. The heart is borderline enlarged. Stable chest x-ray including biapical pleuroparenchymal scarring from prior inflammatory/granulomatous disease. No acute cardiopulmonary disease. Consultations:    Consults:     Final Specialist Recommendations/Findings:   IP CONSULT TO HOSPITALIST        Discharged Condition:    Stable     Disposition: AMA    Physician Follow Up:   No follow-up provider specified. Activity:  activity as tolerated    Diet:  cardiac diet     Discharge Medications:      Medication List      START taking these medications    nitroGLYCERIN 0.4 MG SL tablet  Commonly known as: NITROSTAT  up to max of 3 total doses. If no relief after 1 dose, call 911.         CHANGE how you take these medications    omeprazole 40 MG delayed release capsule  Commonly known as: PRILOSEC  TAKE ONE CAPSULE BY MOUTH EVERY DAY  What changed:   · how much to take  · how to take this  · when to take this        CONTINUE taking these medications    aspirin 81 MG tablet     atenolol 25 MG tablet  Commonly known as: TENORMIN  TAKE ONE TABLET BY MOUTH DAILY     CENTRUM SILVER PO     diclofenac sodium 1 % Gel  Commonly known as: Voltaren  Apply 4 g topically every 4 hours as needed for Pain     gabapentin 600 MG tablet  Commonly known as: NEURONTIN  TAKE ONE TABLET BY MOUTH THREE TIMES A DAY FOR 90 DAYS     levothyroxine 100 MCG tablet  Commonly known as: SYNTHROID  TAKE ONE TABLET BY MOUTH DAILY     simvastatin 40 MG tablet  Commonly known as: ZOCOR  TAKE 1 TABLET BY MOUTH EVERY DAY     triamcinolone 0.025 % cream  Commonly known as: KENALOG        STOP taking these medications    TYLENOL ARTHRITIS PAIN PO           Where to Get Your Medications      These medications were sent to [address] P [phone] [facility] [phone]  [address] [place] [address]    Phone: [phone] · nitroGLYCERIN 0.4 MG SL tablet         Time Spent on discharge is  20 mins in patient examination, evaluation, counseling, medication reconciliation, discharge plan and follow up. Electronically signed by   Sylvia Mckay DO  1/3/2021  6:57 PM      Thank you Dr. Benoit Luo MD for the opportunity to be involved in this patient's care.

## 2021-01-03 NOTE — PLAN OF CARE
Problem: Falls - Risk of:  Goal: Will remain free from falls  Description: Will remain free from falls  Outcome: Ongoing  Note: Siderails up x 2  Hourly rounding  Call light in reach  Instructed to call for assist before attempting out of bed. Remains free from falls and accidental injury at this time   Floor free from obstacles  Bed is locked and in lowest position  Adequate lighting provided  Bed alarm on, Red Falling star and Stay with Me signs posted      Goal: Absence of physical injury  Description: Absence of physical injury  Outcome: Ongoing     Problem: Pain:  Goal: Pain level will decrease  Description: Pain level will decrease  Outcome: Ongoing  Goal: Control of acute pain  Description: Control of acute pain  Outcome: Ongoing  Note: Pain level assessment complete.    Patient educated on pain scale and control interventions  PRN pain medication given per patient request  Patient instructed to call out with new onset of pain or unrelieved pain    Goal: Control of chronic pain  Description: Control of chronic pain  Outcome: Ongoing

## 2021-01-03 NOTE — H&P
[facility]  Office: [address], DO, Jose Hickey, DO, Gen Velasquez, DO, Candace Ponce Blood, DO, Lisa Major MD, Santhosh Garay MD, Casi Michael MD, Chloe Plunkett MD, Yenny Worrell MD, Yahir Olsen MD, Karon Gan MD, Cristal Ann MD, Jeremy Feldman MD, Shannon Smith DO, Joi Diaz MD, Bradly Rodriguez MD, Ofelia Gavin, DO, Diana Serrato MD,  Abhishek Lyons DO, Rafael Hernandez MD, Ulices Tompkins MD, Binh Craft CNP, [facility] Zoe, [facility], Simba Ramirez, CNP, Bernard Woodard, CNS, Cassandra Rosa, CNP, Anay Bazan, CNP, Gill Pichardo, CNP, Checo Lanza, CNP, Tirso Herring, CNP, Mackenzie Polanco PA-C, Thendara Starch, DNP, Candida Dexter, CNP, Guillermina Bill, CNP, Bayron Cordoba, CNP, Rudi Lundborg, CNP, Candidarasheed Norwood, CNP         [address]    HISTORY AND PHYSICAL EXAMINATION            Date:   1/2/2021  Patient name:  Zain Serrano  Date of admission:  1/2/2021  4:27 PM  MRN:   [id number]  Account:  [de-identified]  YOB: 1945  PCP:    Yeni Borden MD  Room:   [facility]/3400-  Code Status:    Full Code    Chief Complaint:     Chief Complaint   Patient presents with    Chest Pain    Gastroesophageal Reflux     History Obtained From:     Patient and electronic medical record. History of Present Illness:     Zain Serrano is a 66 y.o. Non-/non  female who presents with Chest Pain and Gastroesophageal Reflux   and is admitted to the hospital for the management of Chest pain. The patient reports to the hospital with complaint of epigastric pain and chest pain. She states her pain started this afternoon after she ate sushi and has persisted throughout the day. She also reports that she woke up this morning with right arm pain that has resolved. She reports her pain was originally 10/10, severe and dull.  She states her pain is 3/10 at time of assessment and that the morphine she was given helped alleviated her pain. She was given GI cocktail and sublingual nitroglycerine in the ER without relief. She does endorse some chronic shortness of breath with activity. She denies fever, chills, nausea or vomiting. No additional symptomology or modifying factors. She has past medical history that includes MI, hypertension, dyslipidemia, hypothyroidism, right foot drop and neuropathy. She sees Dr. Emi Macias with cardiology outpatient. High sensitivity troponin 29, 24, 24. EKG interpretation per ER physician: [facility] on my interpretation shows no acute finding\"    CXR shows stable chest x-ray including biapical pleuroparenchymal scarring from prior inflammatory/granulomatous disease. No acute cardiopulmonary disease. Echo 7/27/2018  Summary  Left ventricle is normal in size and function. Estimated LV EF 50-55%. Septal mild hypokinesis  Left atrium is at upper limits of normal.  No significant valvular regurgitation or stenosis seen. No pericardial effusion seen. Normal aortic root dimension. Lexiscan stress test 7/27/2018  100% max predicted heart rate:  145%  85% max predicted heart rate:  123%  Resting heart rate:  68  Maximum heart rate achieved:  101  Duration:  0.40  Resting BP:  130/61  Peak BP:  140/72  Peak Double Product: 10,080  % of Age Predicted Maximum:  67  METS:  1.0  Medications Given: 0.4 mg Lexiscan  Reason for Termination:  Medication Infusion Complete. Heart Rate Response:  Normal.  Blood Pressure Response:  Normal response. Baseline EKG demonstrated:  Sinus rhythm, ST-T wave changes. The exercise study demonstrated a maximum of 1 mm of downsloping ST  depression in leads II, III, aVF, V4-V6.     ECG IMPRESSION:  Positive.     FINAL IMPRESSION:  Positive.     Nuclear medicine report to follow.   EVENS ROMERO    Cardiac cath 10/11/2017  Cath EF calculated 55%  · Post trans radial cardiac catheterization    · Mild coronary disease affecting the mid LAD as well as mid circumflex     coronary artery and also mid proximal and mid right coronary artery with     an 80% ostial stenosis in the 2nd diagonal artery which is similar to what     was reported in 2015    · Normal left ventricular contractility    · No mitral regurgitation or aortic stenosis    · Retrospectively the chest pain and borderline troponin elevation may     have been caused by his severe hypertension the patient had when she     presented to the emergency room yesterday        Recommendations:  Conservative medical management, may discharge home in     few hours and follow up with me in the office next week.  Continue with     aggressive risk factor modification and optimal blood pressure control.      Past Medical History:     Past Medical History:   Diagnosis Date    Arthritis     CAD (coronary artery disease)     Esophageal reflux     Foot drop, right foot     Heart attack ([facility] [address]     patient does not know when    History of Clostridium difficile infection     treated on 08/12/17    HL (hearing loss)     Hyperlipidemia     Hypertension     Hypothyroidism     Lumbar radiculopathy     Osteoarthritis     Osteopenia     Psychiatric problem     Tremor     hand        Past Surgical History:     Past Surgical History:   Procedure Laterality Date    BLADDER SUSPENSION      CARDIAC CATHETERIZATION  01/2015, 9/2017    COLONOSCOPY  2012    COLONOSCOPY  08/04/2017    PSEUDOMEMBRANOUS COLITIS WITH MUCOSAL ISCHEMIA, C DIFF POS    DILATION AND CURETTAGE OF UTERUS      JOINT REPLACEMENT Right [id number]    hip-complicated with rt drop foot-    [address] [place] [address] JOINT REPLACEMENT Left 2017    hip-Dr. Regla Bills    TX COLONOSCOPY W/BIOPSY SINGLE/MULTIPLE N/A 8/4/2017    COLONOSCOPY WITH BIOPSY performed by Davion Iniguez MD at [address] [place] [address] EGD TRANSORAL BIOPSY SINGLE/MULTIPLE N/A 8/4/2017    EGD BIOPSY performed by Davion Iniguez MD at [address] Left 03/07/2017    UPPER GASTROINTESTINAL ENDOSCOPY  08/04/2017    MILD CHRONIC GASTRITIS         Medications Prior to Admission:     Prior to Admission medications    Medication Sig Start Date End Date Taking? Authorizing Provider   levothyroxine (SYNTHROID) 100 MCG tablet TAKE ONE TABLET BY MOUTH DAILY 9/25/20  Yes Anna Goode MD   gabapentin (NEURONTIN) 600 MG tablet TAKE ONE TABLET BY MOUTH THREE TIMES A DAY FOR 90 DAYS 9/25/20 1/2/21 Yes Anna Goode MD   simvastatin (ZOCOR) 40 MG tablet TAKE 1 TABLET BY MOUTH EVERY DAY 8/26/20  Yes Anna Goode MD   diclofenac sodium (VOLTAREN) 1 % GEL Apply 4 g topically every 4 hours as needed for Pain 8/26/20  Yes Anna Goode MD   atenolol (TENORMIN) 25 MG tablet TAKE ONE TABLET BY MOUTH DAILY 8/21/20  Yes Anna Goode MD   omeprazole (PRILOSEC) 40 MG delayed release capsule TAKE ONE CAPSULE BY MOUTH EVERY DAY  Patient taking differently: Take 40 mg by mouth daily TAKE ONE CAPSULE BY MOUTH EVERY DAY 7/30/20 1/2/21 Yes Anna Goode MD   triamcinolone (KENALOG) 0.025 % cream Apply 1 applicator topically 2 times daily Indications: on face Apply Topically   Yes Historical Provider, MD   Acetaminophen (TYLENOL ARTHRITIS PAIN PO) Take 1,300 mg by mouth 2 times daily    Yes Historical Provider, MD   aspirin 81 MG tablet Take 325 mg by mouth 2 times daily    Yes Historical Provider, MD   Multiple Vitamins-Minerals (CENTRUM SILVER PO) Take 1 tablet by mouth daily   Yes Historical Provider, MD        Allergies:     Patient has no known allergies. Social History:     Tobacco:    reports that she quit smoking about 13 years ago. She smoked 0.00 packs per day for 30.00 years. She has never used smokeless tobacco.  Alcohol:      reports current alcohol use. Drug Use:  reports no history of drug use.     Family History:     Family History   Problem Relation Age of Onset    Heart Disease Mother     Heart Disease Father     Arthritis Father     Heart Disease Brother        Review of Systems:     Positive and Negative as described in HPI. Review of Systems   Constitutional: Negative for chills, diaphoresis and fever. HENT: Negative for congestion. Eyes: Negative for visual disturbance. Respiratory: Positive for shortness of breath. Negative for cough and wheezing. Cardiovascular: Positive for chest pain. Negative for palpitations and leg swelling. Gastrointestinal: Negative for blood in stool, constipation, diarrhea, nausea and vomiting. Epigastric pain. Endocrine: Negative for cold intolerance and heat intolerance. Genitourinary: Negative for difficulty urinating, dysuria, frequency and urgency. Musculoskeletal: Negative for back pain and neck pain. Skin: Negative for color change and rash. Neurological: Positive for numbness. Negative for dizziness, weakness, light-headedness and headaches. Chronic neuropathy    Hematological: Does not bruise/bleed easily. Psychiatric/Behavioral: The patient is not nervous/anxious. All other systems reviewed and are negative. Physical Exam:   BP (!) 154/61   Pulse 59   Temp 97.9 °F (36.6 °C) (Oral)   Resp 16   Wt 153 lb (69.4 kg)   SpO2 98%   BMI 25.46 kg/m²   Temp (24hrs), Av °F (36.7 °C), Min:97.9 °F (36.6 °C), Max:98 °F (36.7 °C)    No results for input(s): POCGLU in the last 72 hours. No intake or output data in the 24 hours ending 21 9464    Physical Exam  Vitals signs and nursing note reviewed. Constitutional:       General: She is not in acute distress. Appearance: She is not diaphoretic. HENT:      Head: Normocephalic and atraumatic. Right Ear: Decreased hearing noted. Left Ear: Decreased hearing noted. Nose: Nose normal. No rhinorrhea. Eyes:      General: Lids are normal.      Extraocular Movements:      Right eye: Normal extraocular motion. Left eye: Normal extraocular motion.       Conjunctiva/sclera: Conjunctivae normal. mmol/L    Anion Gap 11 9 - 17 mmol/L    GFR Non-African American >60 >60 mL/min    GFR African American >60 >60 mL/min    GFR Comment          GFR Staging NOT REPORTED    CBC Auto Differential    Collection Time: 01/02/21  4:47 PM   Result Value Ref Range    WBC 14.7 (H) 3.5 - 11.3 k/uL    RBC 4.00 3.95 - 5.11 m/uL    Hemoglobin 12.7 11.9 - 15.1 g/dL    Hematocrit 39.5 36.3 - 47.1 %    MCV 98.8 82.6 - 102.9 fL    MCH 31.8 25.2 - 33.5 pg    MCHC 32.2 28.4 - 34.8 g/dL    RDW 14.0 11.8 - 14.4 %    Platelets 980 265 - 445 k/uL    MPV 9.1 8.1 - 13.5 fL    NRBC Automated 0.0 0.0 per 100 WBC    Differential Type NOT REPORTED     Seg Neutrophils 81 (H) 36 - 65 %    Lymphocytes 13 (L) 24 - 43 %    Monocytes 3 3 - 12 %    Eosinophils % 2 1 - 4 %    Basophils 1 0 - 2 %    Immature Granulocytes 0 0 %    Segs Absolute 11.98 (H) 1.50 - 8.10 k/uL    Absolute Lymph # 1.85 1.10 - 3.70 k/uL    Absolute Mono # 0.49 0.10 - 1.20 k/uL    Absolute Eos # 0.23 0.00 - 0.44 k/uL    Basophils Absolute 0.10 0.00 - 0.20 k/uL    Absolute Immature Granulocyte 0.06 0.00 - 0.30 k/uL    WBC Morphology NOT REPORTED     RBC Morphology NOT REPORTED     Platelet Estimate NOT REPORTED    TROP/MYOGLOBIN    Collection Time: 01/02/21  4:47 PM   Result Value Ref Range    Troponin, High Sensitivity 29 (H) 0 - 14 ng/L    Troponin T NOT REPORTED <0.03 ng/mL    Troponin Interp NOT REPORTED     Myoglobin 41 25 - 58 ng/mL   APTT    Collection Time: 01/02/21  4:47 PM   Result Value Ref Range    PTT 32.4 23.9 - 33.8 sec   Protime-INR    Collection Time: 01/02/21  4:47 PM   Result Value Ref Range    Protime 13.4 11.5 - 14.2 sec    INR 1.0    TROP/MYOGLOBIN    Collection Time: 01/02/21  6:00 PM   Result Value Ref Range    Troponin, High Sensitivity 24 (H) 0 - 14 ng/L    Troponin T NOT REPORTED <0.03 ng/mL    Troponin Interp NOT REPORTED     Myoglobin 43 25 - 58 ng/mL   Troponin    Collection Time: 01/02/21  8:56 PM   Result Value Ref Range    Troponin, High Sensitivity 24 (H) 0 - 14 ng/L    Troponin T NOT REPORTED <0.03 ng/mL    Troponin Interp NOT REPORTED        Imaging/Diagnostics:  Xr Chest Portable    Result Date: 1/2/2021  Stable chest x-ray including biapical pleuroparenchymal scarring from prior inflammatory/granulomatous disease. No acute cardiopulmonary disease. Assessment :      Hospital Problems           Last Modified POA    * (Principal) Chest pain 1/2/2021 Yes    Essential hypertension 1/2/2021 Yes    Acquired hypothyroidism 1/2/2021 Yes    Mixed hyperlipidemia 1/2/2021 Yes    Coronary artery disease involving native coronary artery of native heart with angina pectoris ([facility] [address] 1/2/2021 Yes        Plan:     Patient status observation in the  Med/Surge unit. 1. Past medical records reviewed. 2. Nuclear stress test.  3. Repeat EKG in AM.  4. Pain control. 5. Supplemental oxygen as needed. 6. Check UA and urine culture. 7. Hypothyroidism- check TSH, continue levothyroxine. 8. Hyperlipidemia- check lipids, continue atorvastatin. 9. Continue additional home medications. 10. Monitor vital signs. 11. Telemetry. 12. Follow chemistries. 13. Replete electrolytes as needed. 14. DVT prophylaxis. 15. NPO after midnight.   16. Activity as tolerated with assist.     Consultations:   IP CONSULT TO HOSPITALIST    SHANELLE Garcia CNP  1/2/2021  9:43 PM    Copy sent to Dr. Salima Torrez MD     (Please note that portions of this note were completed with a voice recognition program. Efforts were made to edit the dictations but occasionally words are mis-transcribed.)

## 2021-01-03 NOTE — ED PROVIDER NOTES
[address] [place] [address] ED  EMERGENCY DEPARTMENT ENCOUNTER      Pt Name: Shawna Santana  MRN: [id number]  [place] 1945  Date of evaluation: 1/2/2021  Provider: Joseph Dee MD    CHIEF COMPLAINT       Chief Complaint   Patient presents with    Chest Pain    Gastroesophageal Reflux         HISTORY OF PRESENT ILLNESS  (Location/Symptom, Timing/Onset, Context/Setting, Quality, Duration, Modifying Factors, Severity.)   Shawna Santana is a 66 y.o. female who presents to the emergency department for chest pain. She thinks it is heartburn but she is not sure. It is in the upper and lower part of the sternum and it goes into her thoracic area of her back as well. It started about 1:00 while she was eating, sushi. No fever cough or injury. She does not complain of shortness of breath. No vomiting or diarrhea. She has a history of heart disease and a few years ago had a heart catheterization and they tried to put a stent in but it was not successful. The pain is moderate to severe. Nursing Notes were reviewed. ALLERGIES     Patient has no known allergies.     CURRENT MEDICATIONS       Previous Medications    ACETAMINOPHEN (TYLENOL ARTHRITIS PAIN PO)    Take 1,300 mg by mouth 2 times daily     ASPIRIN 81 MG TABLET    Take 325 mg by mouth 2 times daily     ATENOLOL (TENORMIN) 25 MG TABLET    TAKE ONE TABLET BY MOUTH DAILY    DICLOFENAC SODIUM (VOLTAREN) 1 % GEL    Apply 4 g topically every 4 hours as needed for Pain    GABAPENTIN (NEURONTIN) 600 MG TABLET    TAKE ONE TABLET BY MOUTH THREE TIMES A DAY FOR 90 DAYS    LEVOTHYROXINE (SYNTHROID) 100 MCG TABLET    TAKE ONE TABLET BY MOUTH DAILY    MULTIPLE VITAMINS-MINERALS (CENTRUM SILVER PO)    Take 1 tablet by mouth daily    OMEPRAZOLE (PRILOSEC) 40 MG DELAYED RELEASE CAPSULE    TAKE ONE CAPSULE BY MOUTH EVERY DAY    SIMVASTATIN (ZOCOR) 40 MG TABLET    TAKE 1 TABLET BY MOUTH EVERY DAY    TRIAMCINOLONE (KENALOG) 0.025 % CREAM    Apply 1 applicator topically 2 times daily Indications: on face Apply Topically       PAST MEDICAL HISTORY         Diagnosis Date    Arthritis     CAD (coronary artery disease)     Esophageal reflux     Foot drop, right foot     Heart attack [facility])     patient does not know when    History of Clostridium difficile infection     treated on 17    HL (hearing loss)     Hyperlipidemia     Hypertension     Hypothyroidism     Lumbar radiculopathy     Osteoarthritis     Osteopenia     Psychiatric problem     Tremor     hand       SURGICAL HISTORY           Procedure Laterality Date    BLADDER SUSPENSION      CARDIAC CATHETERIZATION  2015, 2017    COLONOSCOPY  2012    COLONOSCOPY  2017    PSEUDOMEMBRANOUS COLITIS WITH MUCOSAL ISCHEMIA, C DIFF POS    DILATION AND CURETTAGE OF UTERUS      JOINT REPLACEMENT Right [id number]    [facility]    hip-Dr. Melchor Stern    OR COLONOSCOPY W/BIOPSY SINGLE/MULTIPLE N/A 2017    COLONOSCOPY WITH BIOPSY performed by Abdulaziz Moeller MD at [address] EGD TRANSORAL BIOPSY SINGLE/MULTIPLE N/A 2017    EGD BIOPSY performed by Abdulaziz Moeller MD at [address] Left 2017    UPPER GASTROINTESTINAL ENDOSCOPY  2017    MILD CHRONIC GASTRITIS          FAMILY HISTORY           Problem Relation Age of Onset    Heart Disease Mother     Heart Disease Father     Arthritis Father     Heart Disease Brother      Family Status   Relation Name Status    Mother      Father      Sister  Alive    Brother  Alive    Son  Alive    Son  Alive        SOCIAL HISTORY      reports that she quit smoking about 13 years ago. She smoked 0.00 packs per day for 30.00 years. She has never used smokeless tobacco. She reports that she does not drink alcohol or use drugs.     REVIEW OF SYSTEMS    (2-9 systems for level 4, 10 or more for level 5)     Review of adenopathy. Skin:     General: Skin is warm and dry. Findings: No erythema or rash. Neurological:      Mental Status: She is alert and oriented to person, place, and time. Psychiatric:         Behavior: Behavior normal.             DIAGNOSTIC RESULTS     EKG: All EKG's are interpreted by the Emergency Department Physician who either signs or Co-signs this chart in the absence of a cardiologist.    EKG on my interpretation shows no acute finding    RADIOLOGY:   Non-plain film images such as CT, Ultrasound and MRI are read by the radiologist. Sakina Munoz radiographic images are visualized and preliminarily interpreted by the emergency physician with the below findings:    Interpretation per the Radiologist below, if available at the time of this note:    Xr Chest Portable    Result Date: 1/2/2021  EXAMINATION: ONE XRAY VIEW OF THE CHEST 1/2/2021 4:49 pm COMPARISON: 07/01/2018 HISTORY: ORDERING SYSTEM PROVIDED HISTORY: Chest Pain TECHNOLOGIST PROVIDED HISTORY: Chest Pain Reason for Exam: Chest pain, gastroesophageal reflux Acuity: Acute Type of Exam: Initial FINDINGS: Stable biapical pleuroparenchymal scarring from prior inflammatory/granulomatous disease. There is slight retraction of the ken superiorly. Diffuse prominence of interstitial lung markings. No acute airspace disease. No pneumothorax or pleural effusion. The heart is borderline enlarged. Stable chest x-ray including biapical pleuroparenchymal scarring from prior inflammatory/granulomatous disease. No acute cardiopulmonary disease.      LABS:  Labs Reviewed   BASIC METABOLIC PANEL - Abnormal; Notable for the following components:       Result Value    Glucose 138 (*)     Bun/Cre Ratio 30 (*)     All other components within normal limits   CBC WITH AUTO DIFFERENTIAL - Abnormal; Notable for the following components:    WBC 14.7 (*)     Seg Neutrophils 81 (*)     Lymphocytes 13 (*)     Segs Absolute 11.98 (*)     All other components within normal limits   TROP/MYOGLOBIN - Abnormal; Notable for the following components:    Troponin, High Sensitivity 29 (*)     All other components within normal limits   TROP/MYOGLOBIN - Abnormal; Notable for the following components:    Troponin, High Sensitivity 24 (*)     All other components within normal limits   APTT   PROTIME-INR       All other labs were within normal range or not returned as of this dictation. EMERGENCY DEPARTMENT COURSE and DIFFERENTIAL DIAGNOSIS/MDM:   Vitals:    Vitals:    21 1627 21 1738 21 1838 21 1912   BP: (!) 141/101 (!) 133/57 130/60    Pulse: 57 51 (!) 49    Resp: 14 19 20    Temp: 98 °F (36.7 °C)      SpO2: 97% 92% (!) 89% 95%   Weight:           Orders Placed This Encounter   Medications    nitroGLYCERIN (NITROSTAT) SL tablet 0.4 mg    aspirin chewable tablet 324 mg    aluminum & magnesium hydroxide-simethicone (MAALOX) 30 mL, lidocaine viscous hcl (XYLOCAINE) 5 mL (GI COCKTAIL)    morphine sulfate (PF) injection 4 mg       Medical Decision Makin sets of cardiac markers are unchanged. She was given nitroglycerin without change and GI cocktail which did not change her pain either. Morphine seemed to bring it down and she will be admitted for observation. Cause uncertain. Treatment diagnosis and disposition were discussed with the patient and her son. CONSULTS:  IP CONSULT TO HOSPITALIST    PROCEDURES:  None    FINAL IMPRESSION      1. Chest pain, unspecified type          DISPOSITION/PLAN   DISPOSITION Decision To Admit 2021 07:16:01 PM      PATIENT REFERRED TO:   No follow-up provider specified.     DISCHARGE MEDICATIONS:     New Prescriptions    No medications on file         (Please note that portions of this note were completed with a voice recognition program.  Efforts were made to edit the dictations but occasionally words are mis-transcribed.)    Lisbeth Chávez MD  Attending Emergency Physician            Lisbeth Chávez, MD  01/02/21 Sara Voss

## 2021-01-03 NOTE — PROGRESS NOTES
The pt left AMA. The AMA form was signed and the risk of leaving AMA was discussed with the pt and her son.

## 2021-01-04 LAB
CULTURE: NORMAL
Lab: NORMAL
SPECIMEN DESCRIPTION: NORMAL

## 2021-01-05 ENCOUNTER — TELEPHONE (OUTPATIENT)
Dept: FAMILY MEDICINE CLINIC | Age: 76
End: 2021-01-05

## 2021-01-05 LAB
EKG ATRIAL RATE: 56 BPM
EKG ATRIAL RATE: 59 BPM
EKG ATRIAL RATE: 70 BPM
EKG P AXIS: 55 DEGREES
EKG P AXIS: 72 DEGREES
EKG P AXIS: 78 DEGREES
EKG P-R INTERVAL: 118 MS
EKG P-R INTERVAL: 124 MS
EKG P-R INTERVAL: 128 MS
EKG Q-T INTERVAL: 450 MS
EKG Q-T INTERVAL: 466 MS
EKG Q-T INTERVAL: 466 MS
EKG QRS DURATION: 90 MS
EKG QRS DURATION: 92 MS
EKG QRS DURATION: 96 MS
EKG QTC CALCULATION (BAZETT): 449 MS
EKG QTC CALCULATION (BAZETT): 461 MS
EKG QTC CALCULATION (BAZETT): 486 MS
EKG R AXIS: 43 DEGREES
EKG R AXIS: 43 DEGREES
EKG R AXIS: 46 DEGREES
EKG T AXIS: -133 DEGREES
EKG T AXIS: -159 DEGREES
EKG T AXIS: 155 DEGREES
EKG VENTRICULAR RATE: 56 BPM
EKG VENTRICULAR RATE: 59 BPM
EKG VENTRICULAR RATE: 70 BPM

## 2021-01-05 NOTE — TELEPHONE ENCOUNTER
Pt is not able to walk  - uses walker.     Please, replace the order:    NM MYOCARDIAL SPECT REST EXERCISE OR RX    With:    Lexiscan stress test    Per [facility] Schedulin [facility], P [phone]

## 2021-01-05 NOTE — TELEPHONE ENCOUNTER
I did not order that stress test.  it was done at the hospital. Please inform whoever called about that

## 2021-01-05 NOTE — TELEPHONE ENCOUNTER
Okay to do as requested for a walker order. What is the other order?  I thought she was in the hospital.

## 2021-01-28 ENCOUNTER — TELEPHONE (OUTPATIENT)
Dept: FAMILY MEDICINE CLINIC | Age: 76
End: 2021-01-28

## 2021-01-28 NOTE — TELEPHONE ENCOUNTER
Pt contacted us with the following:    - Problems sleeping at night  - Cough, with mucus yellow sometimes   - HX: leg pain, also getting worse. Walking getting worse  - HX: SOB, but got worse in the last week with and w/o mask.     [place] [address] [place] [address], [id number] [address] [place] [address]

## 2021-01-28 NOTE — TELEPHONE ENCOUNTER
Pt scheduled for 1.29.21. Pt has cough/sob, with no other symptoms? No hx of fever. Okay, to be seen per provider.

## 2021-01-29 ENCOUNTER — OFFICE VISIT (OUTPATIENT)
Dept: FAMILY MEDICINE CLINIC | Age: 76
End: 2021-01-29
Payer: COMMERCIAL

## 2021-01-29 VITALS
DIASTOLIC BLOOD PRESSURE: 90 MMHG | HEIGHT: 63 IN | BODY MASS INDEX: 24.8 KG/M2 | WEIGHT: 140 LBS | OXYGEN SATURATION: 100 % | HEART RATE: 96 BPM | SYSTOLIC BLOOD PRESSURE: 156 MMHG | TEMPERATURE: 96.6 F

## 2021-01-29 DIAGNOSIS — J20.9 ACUTE BRONCHITIS, UNSPECIFIED ORGANISM: Primary | ICD-10-CM

## 2021-01-29 DIAGNOSIS — I25.119 CORONARY ARTERY DISEASE INVOLVING NATIVE CORONARY ARTERY OF NATIVE HEART WITH ANGINA PECTORIS (HCC): ICD-10-CM

## 2021-01-29 DIAGNOSIS — I10 ESSENTIAL HYPERTENSION: ICD-10-CM

## 2021-01-29 DIAGNOSIS — M15.9 PRIMARY OSTEOARTHRITIS INVOLVING MULTIPLE JOINTS: ICD-10-CM

## 2021-01-29 DIAGNOSIS — F51.01 PRIMARY INSOMNIA: ICD-10-CM

## 2021-01-29 DIAGNOSIS — M21.371 RIGHT FOOT DROP: ICD-10-CM

## 2021-01-29 PROCEDURE — 99214 OFFICE O/P EST MOD 30 MIN: CPT | Performed by: FAMILY MEDICINE

## 2021-01-29 RX ORDER — TRAZODONE HYDROCHLORIDE 50 MG/1
50 TABLET ORAL NIGHTLY
Qty: 30 TABLET | Refills: 0 | Status: SHIPPED | OUTPATIENT
Start: 2021-01-29 | End: 2021-02-25

## 2021-01-29 RX ORDER — AZITHROMYCIN 250 MG/1
TABLET, FILM COATED ORAL
Qty: 1 PACKET | Refills: 0 | Status: SHIPPED | OUTPATIENT
Start: 2021-01-29 | End: 2021-04-02 | Stop reason: ALTCHOICE

## 2021-01-29 ASSESSMENT — ENCOUNTER SYMPTOMS
ALLERGIC/IMMUNOLOGIC NEGATIVE: 1
SHORTNESS OF BREATH: 1
COUGH: 1
ABDOMINAL PAIN: 0
BACK PAIN: 1
EYES NEGATIVE: 1
DIARRHEA: 0
CONSTIPATION: 0

## 2021-01-29 ASSESSMENT — PATIENT HEALTH QUESTIONNAIRE - PHQ9
1. LITTLE INTEREST OR PLEASURE IN DOING THINGS: 1
SUM OF ALL RESPONSES TO PHQ9 QUESTIONS 1 & 2: 2
SUM OF ALL RESPONSES TO PHQ QUESTIONS 1-9: 2

## 2021-01-29 NOTE — PROGRESS NOTES
[facility]  [facility]  [address]  [facility] [address]  Dept: [phone]    1/29/2021    CHIEF COMPLAINT    Chief Complaint   Patient presents with    Cough    Insomnia    Shortness of Breath    Leg Pain       VERENA Sloan is a 66 y.o. female who presents   Chief Complaint   Patient presents with    Cough    Insomnia    Shortness of Breath    Leg Pain   . Had a recent hospital stay for chest pain. Was diagnosed with heartburn. Taking ppi. Hx of cad. Was seeing Dr. Maame Johnson but is no longer on her insurance plan. He ordered a stress test but she cannot walk on the treadmill. bp has been normal at home. Chronic leg pain related to lumbar degenerative disc disease and right foot drop she wears a brace on the right foot and uses a walker to ambulate. Cough  This is a recurrent problem. The current episode started 1 to 4 weeks ago. The problem occurs hourly. The cough is productive of sputum. Associated symptoms include shortness of breath. Pertinent negatives include no chest pain, fever or headaches. Hemoptysis: chronic. Shortness of Breath  This is a recurrent problem. The current episode started 1 to 4 weeks ago. The problem occurs intermittently. The problem has been waxing and waning. Associated symptoms include leg pain. Pertinent negatives include no abdominal pain, chest pain, fever, headaches or leg swelling. Hemoptysis: chronic. The symptoms are aggravated by any activity. Her past medical history is significant for CAD. Leg Pain   The incident occurred more than 1 week ago. The pain has been fluctuating since onset. She has tried rest (Using walker to ambulate) for the symptoms.        Vitals:    01/29/21 1433 01/29/21 1504   BP: (!) 150/90 (!) 156/90   Pulse: 96    Temp: 96.6 °F (35.9 °C)    SpO2: 100%    Weight: 140 lb (63.5 kg)    Height: 5' 3\" (1.6 m)        REVIEW OF SYSTEMS    Review of Systems   Constitutional: wheezing or rales. Abdominal:      Palpations: Abdomen is soft. Tenderness: There is no abdominal tenderness. There is no guarding or rebound. Musculoskeletal: Normal range of motion. General: Tenderness (kyphosis) present. No deformity. Lymphadenopathy:      Cervical: No cervical adenopathy. Skin:     General: Skin is warm and dry. Neurological:      Mental Status: She is alert and oriented to person, place, and time. Gait: Gait abnormal.      Comments: Unsteady gait, leans forward   Psychiatric:         Mood and Affect: Mood normal.         Behavior: Behavior normal.         Thought Content: Thought content normal.         Judgment: Judgment normal.         ASSESSMENT/PLAN  1. Acute bronchitis, unspecified organism  Not improving. Start Zithromax. Use Mucinex DM. Call if not improving.  - azithromycin (ZITHROMAX) 250 MG tablet; Take 2 tabs (500 mg) on Day 1, and take 1 tab (250 mg) on days 2 through 5. Dispense: 1 packet; Refill: 0    2. Essential hypertension  Chronic, continue medications. See cardiologist  - Armond Wright MD, Cardiology, [facility]    3. Primary insomnia  Trial of trazodone at 50 mg. May increase if needed  - traZODone (DESYREL) 50 MG tablet; Take 1 tablet by mouth nightly  Dispense: 30 tablet; Refill: 0    4. Coronary artery disease involving native coronary artery of native heart with angina pectoris ([facility] [address]  Referred to cardiology. She is going to check with her insurance to make sure he is on her plan. - Armond Wright MD, Cardiology, [facility]    5. Primary osteoarthritis involving multiple joints  Continue using walker and taking Neurontin    6. Right foot drop  Continue using foot brace and using walker       Boydthai Malathi received counseling on the following healthy behaviors: nutrition, exercise and medication adherence  Reviewed prior labs and health maintenance. Continue current medications, diet and exercise.   Discussed use, benefit, and side effects of prescribed medications. Barriers to medication compliance addressed. Patient given educational materials - see patient instructions. All patient questions answered. Patient voiced understanding. Return in about 2 months (around 3/29/2021) for htn.         Electronically signed by Anna Goode MD on 1/29/21 at 2:40 PM EST

## 2021-02-03 ENCOUNTER — IMMUNIZATION (OUTPATIENT)
Dept: FAMILY MEDICINE CLINIC | Age: 76
End: 2021-02-03
Payer: COMMERCIAL

## 2021-02-03 PROCEDURE — 0011A COVID-19, MODERNA VACCINE 100MCG/0.5ML DOSE: CPT | Performed by: INTERNAL MEDICINE

## 2021-02-03 PROCEDURE — 91301 COVID-19, MODERNA VACCINE 100MCG/0.5ML DOSE: CPT | Performed by: INTERNAL MEDICINE

## 2021-02-21 RX ORDER — SIMVASTATIN 40 MG
TABLET ORAL
Qty: 90 TABLET | Refills: 3 | Status: SHIPPED
Start: 2021-02-21 | End: 2021-10-26 | Stop reason: ALTCHOICE

## 2021-02-25 DIAGNOSIS — F51.01 PRIMARY INSOMNIA: ICD-10-CM

## 2021-02-25 RX ORDER — TRAZODONE HYDROCHLORIDE 50 MG/1
TABLET ORAL
Qty: 30 TABLET | Refills: 0 | Status: SHIPPED | OUTPATIENT
Start: 2021-02-25 | End: 2021-03-20

## 2021-03-01 RX ORDER — OMEPRAZOLE 40 MG/1
CAPSULE, DELAYED RELEASE ORAL
Qty: 30 CAPSULE | Refills: 5 | Status: SHIPPED | OUTPATIENT
Start: 2021-03-01 | End: 2021-08-25

## 2021-03-01 NOTE — TELEPHONE ENCOUNTER
Pharm requested refill    Health Maintenance   Topic Date Due    Hepatitis C screen  1945    Annual Wellness Visit (AWV)  04/26/2020    Flu vaccine (1) 09/01/2020    COVID-19 Vaccine (2 of 2 - Moderna series) 03/03/2021    Shingles Vaccine (1 of 2) 10/10/2024 (Originally 3/21/1993)    DTaP/Tdap/Td vaccine (2 - Td) 04/04/2021    Lipid screen  01/03/2022    TSH testing  01/03/2022    Potassium monitoring  01/03/2022    Creatinine monitoring  01/03/2022    DEXA (modify frequency per FRAX score)  Completed    Pneumococcal 65+ years Vaccine  Completed    Hepatitis A vaccine  Aged Out    Hepatitis B vaccine  Aged Out    Hib vaccine  Aged Out    Meningococcal (ACWY) vaccine  Aged Out             (applicable per patient's age: Cancer Screenings, Depression Screening, Fall Risk Screening, Immunizations)    LDL Cholesterol (mg/dL)   Date Value   01/03/2021 65     AST (U/L)   Date Value   01/03/2021 51 (H)     ALT (U/L)   Date Value   01/03/2021 39 (H)     BUN (mg/dL)   Date Value   01/03/2021 18      (goal A1C is < 7)   (goal LDL is <100) need 30-50% reduction from baseline     BP Readings from Last 3 Encounters:   01/29/21 (!) 156/90   01/03/21 (!) 123/45   12/30/19 130/70    (goal /80)      All Future Testing planned in [facility]:  Lab Frequency Next Occurrence       Next Visit Date:  Future Appointments   Date Time Provider Jovany Chandler   3/3/2021  9:10 AM [facility] JOLANTA ARMANDO, MODERNA 28 DAY SECOND DOSE [place] ARMANDO [address] [facility]   4/2/2021  2:30 PM [facility]            Patient Active Problem List:     Essential hypertension     Fatigue     Acquired hypothyroidism     Right foot drop     NSVT (nonsustained ventricular tachycardia) ([facility] [address]     Sigmoid diverticulosis     Primary osteoarthritis involving multiple joints     Mixed hyperlipidemia     Coronary artery disease involving native coronary artery of native heart with angina pectoris (HCC)     Esophageal reflux

## 2021-03-03 ENCOUNTER — IMMUNIZATION (OUTPATIENT)
Dept: FAMILY MEDICINE CLINIC | Age: 76
End: 2021-03-03
Payer: COMMERCIAL

## 2021-03-03 PROCEDURE — 91301 COVID-19, MODERNA VACCINE 100MCG/0.5ML DOSE: CPT | Performed by: INTERNAL MEDICINE

## 2021-03-03 PROCEDURE — 0012A COVID-19, MODERNA VACCINE 100MCG/0.5ML DOSE: CPT | Performed by: INTERNAL MEDICINE

## 2021-03-10 ENCOUNTER — HOSPITAL ENCOUNTER (OUTPATIENT)
Age: 76
Discharge: HOME | End: 2021-03-10
Payer: COMMERCIAL

## 2021-03-10 LAB
ANION GAP SERPL CALCULATED.3IONS-SCNC: 15 MMOL/L (ref 9–17)
BUN BLDV-MCNC: 27 MG/DL (ref 8–23)
BUN/CREAT BLD: ABNORMAL (ref 9–20)
CALCIUM SERPL-MCNC: 9.5 MG/DL (ref 8.6–10.4)
CHLORIDE BLD-SCNC: 102 MMOL/L (ref 98–107)
CO2: 25 MMOL/L (ref 20–31)
CREAT SERPL-MCNC: 0.76 MG/DL (ref 0.5–0.9)
GFR AFRICAN AMERICAN: >60 ML/MIN
GFR NON-AFRICAN AMERICAN: >60 ML/MIN
GFR SERPL CREATININE-BSD FRML MDRD: ABNORMAL ML/MIN/{1.73_M2}
GFR SERPL CREATININE-BSD FRML MDRD: ABNORMAL ML/MIN/{1.73_M2}
GLUCOSE BLD-MCNC: 99 MG/DL (ref 70–99)
POTASSIUM SERPL-SCNC: 4.1 MMOL/L (ref 3.7–5.3)
SODIUM BLD-SCNC: 142 MMOL/L (ref 135–144)

## 2021-03-10 PROCEDURE — 36415 COLL VENOUS BLD VENIPUNCTURE: CPT

## 2021-03-10 PROCEDURE — 80048 BASIC METABOLIC PNL TOTAL CA: CPT

## 2021-03-20 DIAGNOSIS — F51.01 PRIMARY INSOMNIA: ICD-10-CM

## 2021-03-20 RX ORDER — TRAZODONE HYDROCHLORIDE 50 MG/1
TABLET ORAL
Qty: 30 TABLET | Refills: 0 | Status: SHIPPED | OUTPATIENT
Start: 2021-03-20 | End: 2021-04-16

## 2021-03-29 ENCOUNTER — HOSPITAL ENCOUNTER (OUTPATIENT)
Age: 76
Discharge: HOME | End: 2021-03-29
Payer: COMMERCIAL

## 2021-03-29 LAB
ANION GAP SERPL CALCULATED.3IONS-SCNC: 11 MMOL/L (ref 9–17)
BUN BLDV-MCNC: 20 MG/DL (ref 8–23)
BUN/CREAT BLD: NORMAL (ref 9–20)
CALCIUM SERPL-MCNC: 9.4 MG/DL (ref 8.6–10.4)
CHLORIDE BLD-SCNC: 102 MMOL/L (ref 98–107)
CO2: 26 MMOL/L (ref 20–31)
CREAT SERPL-MCNC: 0.72 MG/DL (ref 0.5–0.9)
GFR AFRICAN AMERICAN: >60 ML/MIN
GFR NON-AFRICAN AMERICAN: >60 ML/MIN
GFR SERPL CREATININE-BSD FRML MDRD: NORMAL ML/MIN/{1.73_M2}
GFR SERPL CREATININE-BSD FRML MDRD: NORMAL ML/MIN/{1.73_M2}
GLUCOSE BLD-MCNC: 85 MG/DL (ref 70–99)
POTASSIUM SERPL-SCNC: 4.9 MMOL/L (ref 3.7–5.3)
SODIUM BLD-SCNC: 139 MMOL/L (ref 135–144)

## 2021-03-29 PROCEDURE — 80048 BASIC METABOLIC PNL TOTAL CA: CPT

## 2021-03-29 PROCEDURE — 36415 COLL VENOUS BLD VENIPUNCTURE: CPT

## 2021-04-02 ENCOUNTER — OFFICE VISIT (OUTPATIENT)
Dept: FAMILY MEDICINE CLINIC | Age: 76
End: 2021-04-02
Payer: COMMERCIAL

## 2021-04-02 VITALS
DIASTOLIC BLOOD PRESSURE: 60 MMHG | BODY MASS INDEX: 24.41 KG/M2 | TEMPERATURE: 96.4 F | SYSTOLIC BLOOD PRESSURE: 128 MMHG | WEIGHT: 137.8 LBS

## 2021-04-02 DIAGNOSIS — E03.9 HYPOTHYROIDISM, UNSPECIFIED TYPE: ICD-10-CM

## 2021-04-02 DIAGNOSIS — E78.2 MIXED HYPERLIPIDEMIA: ICD-10-CM

## 2021-04-02 DIAGNOSIS — M15.9 PRIMARY OSTEOARTHRITIS INVOLVING MULTIPLE JOINTS: ICD-10-CM

## 2021-04-02 DIAGNOSIS — I25.119 CORONARY ARTERY DISEASE INVOLVING NATIVE CORONARY ARTERY OF NATIVE HEART WITH ANGINA PECTORIS (HCC): ICD-10-CM

## 2021-04-02 DIAGNOSIS — I10 ESSENTIAL HYPERTENSION: Primary | ICD-10-CM

## 2021-04-02 PROCEDURE — 99214 OFFICE O/P EST MOD 30 MIN: CPT | Performed by: FAMILY MEDICINE

## 2021-04-02 RX ORDER — SPIRONOLACTONE 25 MG/1
25 TABLET ORAL DAILY
COMMUNITY

## 2021-04-02 RX ORDER — BUMETANIDE 0.5 MG/1
0.5 TABLET ORAL DAILY
COMMUNITY
End: 2021-12-03 | Stop reason: SDUPTHER

## 2021-04-02 RX ORDER — METOPROLOL SUCCINATE 25 MG/1
25 TABLET, EXTENDED RELEASE ORAL DAILY
COMMUNITY
End: 2022-04-05 | Stop reason: DRUGHIGH

## 2021-04-02 RX ORDER — SACUBITRIL AND VALSARTAN 24; 26 MG/1; MG/1
1 TABLET, FILM COATED ORAL 2 TIMES DAILY
COMMUNITY

## 2021-04-02 ASSESSMENT — ENCOUNTER SYMPTOMS
BACK PAIN: 1
SHORTNESS OF BREATH: 0
EYES NEGATIVE: 1
CONSTIPATION: 0
COUGH: 0
ALLERGIC/IMMUNOLOGIC NEGATIVE: 1
ABDOMINAL PAIN: 0
DIARRHEA: 0
BLOOD IN STOOL: 0

## 2021-04-02 NOTE — PROGRESS NOTES
[facility]  [facility]  [address]  [place] [address]  Dept: [phone]    4/2/2021    CHIEF COMPLAINT    Chief Complaint   Patient presents with    Hypertension       HPI    Sushil Engel is a 64 y.o. female who presents   Chief Complaint   Patient presents with    Hypertension   . Recheck htn, sob. Was seen by cardiologist and was placed on diuretics and Entresto which have improved her symptoms of shortness of breath and edema significantly. She continues to use a walker for ambulation due to nerve damage in her right leg following back surgery. Taking medications as prescribed for GERD, high cholesterol, hypothyroidism, hypertension and neuropathy. Hypertension  Pertinent negatives include no chest pain, headaches, palpitations or shortness of breath. Vitals:    04/02/21 1322   BP: 128/60   Temp: 96.4 °F (35.8 °C)   Weight: 137 lb 12.8 oz (62.5 kg)       REVIEW OF SYSTEMS    Review of Systems   Constitutional: Positive for fatigue. Negative for fever and unexpected weight change. HENT: Negative. Eyes: Negative. Respiratory: Negative for cough and shortness of breath. Cardiovascular: Negative for chest pain, palpitations and leg swelling. Shortness of breath and edema have improved significantly on meds from cardiologist   Gastrointestinal: Negative for abdominal pain, blood in stool, constipation and diarrhea. Endocrine: Negative. Genitourinary: Negative for frequency and urgency. Musculoskeletal: Positive for back pain and gait problem. Chronic conditions   Skin: Negative. Allergic/Immunologic: Negative. Neurological: Negative for dizziness and headaches. Hematological: Negative. Psychiatric/Behavioral: Negative for sleep disturbance. The patient is not nervous/anxious.         PAST MEDICAL HISTORY    Past Medical History:   Diagnosis Date    Arthritis     CAD (coronary artery disease)  Elevated liver enzymes     Esophageal reflux     Foot drop, right foot     Heart attack ([facility] [address]     patient does not know when    History of Clostridium difficile infection     treated on 17    HL (hearing loss)     Hyperlipidemia     Hypertension     Hypothyroidism     Lumbar radiculopathy     Osteoarthritis     Osteopenia     Psychiatric problem     Tremor     hand       FAMILY HISTORY    Family History   Problem Relation Age of Onset    Heart Disease Mother     Heart Disease Father     Arthritis Father     Heart Disease Brother        SOCIAL HISTORY    Social History     Socioeconomic History    Marital status:      Spouse name: None    Number of children: None    Years of education: None    Highest education level: None   Occupational History    None   Social Needs    Financial resource strain: None    Food insecurity     Worry: None     Inability: None    Transportation needs     Medical: None     Non-medical: None   Tobacco Use    Smoking status: Former Smoker     Packs/day: 0.00     Years: 30.00     Pack years: 0.00     Quit date: 2/10/2007     Years since quittin.1    Smokeless tobacco: Never Used   Substance and Sexual Activity    Alcohol use: Yes     Comment: occas    Drug use: No    Sexual activity: None   Lifestyle    Physical activity     Days per week: None     Minutes per session: None    Stress: None   Relationships    Social connections     Talks on phone: None     Gets together: None     Attends Voodoo service: None     Active member of club or organization: None     Attends meetings of clubs or organizations: None     Relationship status: None    Intimate partner violence     Fear of current or ex partner: None     Emotionally abused: None     Physically abused: None     Forced sexual activity: None   Other Topics Concern    None   Social History Narrative    None       SURGICAL HISTORY    Past Surgical History:   Procedure Laterality Date [id number] [facility]. CARDIAC CATHETERIZATION  01/2015, 9/2017    COLONOSCOPY  2012    COLONOSCOPY  08/04/2017    PSEUDOMEMBRANOUS COLITIS WITH MUCOSAL ISCHEMIA, C DIFF POS    DILATION AND CURETTAGE OF UTERUS      JOINT REPLACEMENT Right [id number]    hip-complicated with rt drop foot-Dr.    [facility] JOINT REPLACEMENT Left 2017    hip-Dr. Antunez April    AL COLONOSCOPY W/BIOPSY SINGLE/MULTIPLE N/A 8/4/2017    COLONOSCOPY WITH BIOPSY performed by Neeta Shah MD at [address] [facility] EGD TRANSORAL BIOPSY SINGLE/MULTIPLE N/A 8/4/2017    EGD BIOPSY performed by Neeta Shah MD at [address] Left 03/07/2017    UPPER GASTROINTESTINAL ENDOSCOPY  08/04/2017    MILD CHRONIC GASTRITIS        CURRENT MEDICATIONS    Current Outpatient Medications   Medication Sig Dispense Refill    metoprolol succinate (TOPROL XL) 25 MG extended release tablet Take 25 mg by mouth daily      spironolactone (ALDACTONE) 25 MG tablet Take 25 mg by mouth daily      bumetanide (BUMEX) 0.5 MG tablet Take 0.5 mg by mouth daily      sacubitril-valsartan (ENTRESTO) 24-26 MG per tablet Take 1 tablet by mouth 2 times daily      omeprazole (PRILOSEC) 40 MG delayed release capsule TAKE ONE CAPSULE BY MOUTH DAILY 30 capsule 5    simvastatin (ZOCOR) 40 MG tablet TAKE ONE TABLET BY MOUTH DAILY 90 tablet 3    nitroGLYCERIN (NITROSTAT) 0.4 MG SL tablet up to max of 3 total doses.  If no relief after 1 dose, call 911. 25 tablet 3    levothyroxine (SYNTHROID) 100 MCG tablet TAKE ONE TABLET BY MOUTH DAILY 90 tablet 2    gabapentin (NEURONTIN) 600 MG tablet TAKE ONE TABLET BY MOUTH THREE TIMES A DAY FOR 90 DAYS 270 tablet 2    diclofenac sodium (VOLTAREN) 1 % GEL Apply 4 g topically every 4 hours as needed for Pain 1 Tube 5    atenolol (TENORMIN) 25 MG tablet TAKE ONE TABLET BY MOUTH DAILY 90 tablet 2    triamcinolone (KENALOG) 0.025 % cream Apply 1 applicator topically 2 times daily Indications: on face Apply Topically      aspirin 81 MG tablet Take 325 mg by mouth 2 times daily       Multiple Vitamins-Minerals (CENTRUM SILVER PO) Take 1 tablet by mouth daily      traZODone (DESYREL) 50 MG tablet TAKE ONE TABLET BY MOUTH ONCE NIGHTLY (Patient not taking: Reported on 4/2/2021) 30 tablet 0     No current facility-administered medications for this visit. ALLERGIES    No Known Allergies    PHYSICAL EXAM   Physical Exam  Vitals signs reviewed. Constitutional:       Appearance: She is well-developed. HENT:      Head: Normocephalic. Eyes:      Conjunctiva/sclera: Conjunctivae normal.      Pupils: Pupils are equal, round, and reactive to light. Neck:      Musculoskeletal: Normal range of motion and neck supple. Thyroid: No thyromegaly. Cardiovascular:      Rate and Rhythm: Normal rate and regular rhythm. Heart sounds: Normal heart sounds. No murmur. Pulmonary:      Effort: Pulmonary effort is normal.      Breath sounds: Normal breath sounds. No wheezing or rales. Abdominal:      Palpations: Abdomen is soft. Tenderness: There is no abdominal tenderness. There is no guarding or rebound. Musculoskeletal: Normal range of motion. General: Deformity (  Right foot drop.) present. No tenderness. Lymphadenopathy:      Cervical: No cervical adenopathy. Skin:     General: Skin is warm and dry. Neurological:      Mental Status: She is alert and oriented to person, place, and time. Psychiatric:         Mood and Affect: Mood normal.         Behavior: Behavior normal.         Thought Content: Thought content normal.         Judgment: Judgment normal.         ASSESSMENT/PLAN  1. Essential hypertension  Chronic and stable on current meds. 2. Primary osteoarthritis involving multiple joints  Continue activity within limitations    3.  Coronary artery disease involving native coronary artery of native heart with angina pectoris ([facility] [address]  Continue cardiovascular risk reduction and follow with cardiologist    4. Hypothyroidism, unspecified type  Chronic and stable    5. Mixed hyperlipidemia  Chronic and stable. Labs reviewed and up-to-date       Landryleighann Benson received counseling on the following healthy behaviors: nutrition, exercise and medication adherence  Reviewed prior labs and health maintenance. Continue current medications, diet and exercise. Discussed use, benefit, and side effects of prescribed medications. Barriers to medication compliance addressed. Patient given educational materials - see patient instructions. All patient questions answered. Patient voiced understanding. Return in about 6 months (around 10/2/2021) for htn.         Electronically signed by Keeley Kemp MD on 4/2/21 at 1:28 PM EDT

## 2021-04-16 DIAGNOSIS — F51.01 PRIMARY INSOMNIA: ICD-10-CM

## 2021-04-16 RX ORDER — TRAZODONE HYDROCHLORIDE 50 MG/1
TABLET ORAL
Qty: 30 TABLET | Refills: 5 | Status: SHIPPED | OUTPATIENT
Start: 2021-04-16 | End: 2022-01-17

## 2021-05-24 ENCOUNTER — APPOINTMENT (OUTPATIENT)
Dept: CT IMAGING | Age: 76
End: 2021-05-24
Payer: COMMERCIAL

## 2021-05-24 ENCOUNTER — HOSPITAL ENCOUNTER (EMERGENCY)
Age: 76
Discharge: LEFT BEFORE DISCHARGE | End: 2021-05-25
Attending: EMERGENCY MEDICINE
Payer: COMMERCIAL

## 2021-05-24 ENCOUNTER — APPOINTMENT (OUTPATIENT)
Dept: GENERAL RADIOLOGY | Age: 76
End: 2021-05-24
Payer: COMMERCIAL

## 2021-05-24 DIAGNOSIS — K82.1 GALLBLADDER HYDROPS: ICD-10-CM

## 2021-05-24 DIAGNOSIS — R07.9 CHEST PAIN, UNSPECIFIED TYPE: Primary | ICD-10-CM

## 2021-05-24 DIAGNOSIS — R74.01 TRANSAMINITIS: ICD-10-CM

## 2021-05-24 LAB
ABSOLUTE EOS #: <0.03 K/UL (ref 0–0.44)
ABSOLUTE IMMATURE GRANULOCYTE: 0.05 K/UL (ref 0–0.3)
ABSOLUTE LYMPH #: 1.55 K/UL (ref 1.1–3.7)
ABSOLUTE MONO #: 0.32 K/UL (ref 0.1–1.2)
ALBUMIN SERPL-MCNC: 4.8 G/DL (ref 3.5–5.2)
ALBUMIN/GLOBULIN RATIO: ABNORMAL (ref 1–2.5)
ALP BLD-CCNC: 127 U/L (ref 35–104)
ALT SERPL-CCNC: 108 U/L (ref 5–33)
AMYLASE: 69 U/L (ref 28–100)
ANION GAP SERPL CALCULATED.3IONS-SCNC: 16 MMOL/L (ref 9–17)
AST SERPL-CCNC: 165 U/L
BASOPHILS # BLD: 1 % (ref 0–2)
BASOPHILS ABSOLUTE: 0.06 K/UL (ref 0–0.2)
BILIRUB SERPL-MCNC: 2.94 MG/DL (ref 0.3–1.2)
BUN BLDV-MCNC: 23 MG/DL (ref 8–23)
BUN/CREAT BLD: 32 (ref 9–20)
CALCIUM SERPL-MCNC: 10.2 MG/DL (ref 8.6–10.4)
CHLORIDE BLD-SCNC: 96 MMOL/L (ref 98–107)
CO2: 23 MMOL/L (ref 20–31)
CREAT SERPL-MCNC: 0.73 MG/DL (ref 0.5–0.9)
DIFFERENTIAL TYPE: ABNORMAL
EOSINOPHILS RELATIVE PERCENT: 0 % (ref 1–4)
GFR AFRICAN AMERICAN: >60 ML/MIN
GFR NON-AFRICAN AMERICAN: >60 ML/MIN
GFR SERPL CREATININE-BSD FRML MDRD: ABNORMAL ML/MIN/{1.73_M2}
GFR SERPL CREATININE-BSD FRML MDRD: ABNORMAL ML/MIN/{1.73_M2}
GLUCOSE BLD-MCNC: 134 MG/DL (ref 70–99)
HCT VFR BLD CALC: 38.8 % (ref 36.3–47.1)
HEMOGLOBIN: 12.9 G/DL (ref 11.9–15.1)
IMMATURE GRANULOCYTES: 1 %
LIPASE: 84 U/L (ref 13–60)
LYMPHOCYTES # BLD: 14 % (ref 24–43)
MCH RBC QN AUTO: 31.9 PG (ref 25.2–33.5)
MCHC RBC AUTO-ENTMCNC: 33.2 G/DL (ref 28.4–34.8)
MCV RBC AUTO: 96 FL (ref 82.6–102.9)
MONOCYTES # BLD: 3 % (ref 3–12)
NRBC AUTOMATED: 0 PER 100 WBC
PDW BLD-RTO: 13 % (ref 11.8–14.4)
PLATELET # BLD: 299 K/UL (ref 138–453)
PLATELET ESTIMATE: ABNORMAL
PMV BLD AUTO: 9.1 FL (ref 8.1–13.5)
POTASSIUM SERPL-SCNC: 3.7 MMOL/L (ref 3.7–5.3)
RBC # BLD: 4.04 M/UL (ref 3.95–5.11)
RBC # BLD: ABNORMAL 10*6/UL
SEG NEUTROPHILS: 81 % (ref 36–65)
SEGMENTED NEUTROPHILS ABSOLUTE COUNT: 8.79 K/UL (ref 1.5–8.1)
SODIUM BLD-SCNC: 135 MMOL/L (ref 135–144)
TOTAL PROTEIN: 8.5 G/DL (ref 6.4–8.3)
TROPONIN INTERP: ABNORMAL
TROPONIN INTERP: ABNORMAL
TROPONIN T: ABNORMAL NG/ML
TROPONIN T: ABNORMAL NG/ML
TROPONIN, HIGH SENSITIVITY: 18 NG/L (ref 0–14)
TROPONIN, HIGH SENSITIVITY: 19 NG/L (ref 0–14)
WBC # BLD: 10.8 K/UL (ref 3.5–11.3)
WBC # BLD: ABNORMAL 10*3/UL

## 2021-05-24 PROCEDURE — 85025 COMPLETE CBC W/AUTO DIFF WBC: CPT

## 2021-05-24 PROCEDURE — 93005 ELECTROCARDIOGRAM TRACING: CPT | Performed by: EMERGENCY MEDICINE

## 2021-05-24 PROCEDURE — 6360000004 HC RX CONTRAST MEDICATION: Performed by: EMERGENCY MEDICINE

## 2021-05-24 PROCEDURE — C9113 INJ PANTOPRAZOLE SODIUM, VIA: HCPCS | Performed by: EMERGENCY MEDICINE

## 2021-05-24 PROCEDURE — 84484 ASSAY OF TROPONIN QUANT: CPT

## 2021-05-24 PROCEDURE — 82150 ASSAY OF AMYLASE: CPT

## 2021-05-24 PROCEDURE — 71275 CT ANGIOGRAPHY CHEST: CPT

## 2021-05-24 PROCEDURE — 2580000003 HC RX 258: Performed by: EMERGENCY MEDICINE

## 2021-05-24 PROCEDURE — 96376 TX/PRO/DX INJ SAME DRUG ADON: CPT

## 2021-05-24 PROCEDURE — 6360000002 HC RX W HCPCS: Performed by: EMERGENCY MEDICINE

## 2021-05-24 PROCEDURE — 96375 TX/PRO/DX INJ NEW DRUG ADDON: CPT

## 2021-05-24 PROCEDURE — 80053 COMPREHEN METABOLIC PANEL: CPT

## 2021-05-24 PROCEDURE — 99284 EMERGENCY DEPT VISIT MOD MDM: CPT

## 2021-05-24 PROCEDURE — 83690 ASSAY OF LIPASE: CPT

## 2021-05-24 PROCEDURE — 74022 RADEX COMPL AQT ABD SERIES: CPT

## 2021-05-24 PROCEDURE — 2500000003 HC RX 250 WO HCPCS: Performed by: EMERGENCY MEDICINE

## 2021-05-24 PROCEDURE — 96374 THER/PROPH/DIAG INJ IV PUSH: CPT

## 2021-05-24 RX ORDER — PANTOPRAZOLE SODIUM 40 MG/10ML
40 INJECTION, POWDER, LYOPHILIZED, FOR SOLUTION INTRAVENOUS ONCE
Status: COMPLETED | OUTPATIENT
Start: 2021-05-24 | End: 2021-05-24

## 2021-05-24 RX ORDER — MORPHINE SULFATE 2 MG/ML
2 INJECTION, SOLUTION INTRAMUSCULAR; INTRAVENOUS ONCE
Status: COMPLETED | OUTPATIENT
Start: 2021-05-24 | End: 2021-05-24

## 2021-05-24 RX ORDER — ONDANSETRON 2 MG/ML
4 INJECTION INTRAMUSCULAR; INTRAVENOUS ONCE
Status: COMPLETED | OUTPATIENT
Start: 2021-05-24 | End: 2021-05-24

## 2021-05-24 RX ORDER — SODIUM CHLORIDE 0.9 % (FLUSH) 0.9 %
10 SYRINGE (ML) INJECTION PRN
Status: DISCONTINUED | OUTPATIENT
Start: 2021-05-24 | End: 2021-05-25 | Stop reason: HOSPADM

## 2021-05-24 RX ORDER — ASPIRIN 81 MG/1
324 TABLET, CHEWABLE ORAL ONCE
Status: COMPLETED | OUTPATIENT
Start: 2021-05-24 | End: 2021-05-25

## 2021-05-24 RX ORDER — 0.9 % SODIUM CHLORIDE 0.9 %
80 INTRAVENOUS SOLUTION INTRAVENOUS ONCE
Status: COMPLETED | OUTPATIENT
Start: 2021-05-24 | End: 2021-05-24

## 2021-05-24 RX ADMIN — MORPHINE SULFATE 2 MG: 2 INJECTION, SOLUTION INTRAMUSCULAR; INTRAVENOUS at 22:39

## 2021-05-24 RX ADMIN — PANTOPRAZOLE SODIUM 40 MG: 40 INJECTION, POWDER, FOR SOLUTION INTRAVENOUS at 21:26

## 2021-05-24 RX ADMIN — SODIUM CHLORIDE, PRESERVATIVE FREE 10 ML: 5 INJECTION INTRAVENOUS at 23:32

## 2021-05-24 RX ADMIN — SODIUM CHLORIDE 80 ML: 9 INJECTION, SOLUTION INTRAVENOUS at 23:32

## 2021-05-24 RX ADMIN — PANTOPRAZOLE SODIUM 40 MG: 40 INJECTION, POWDER, FOR SOLUTION INTRAVENOUS at 22:30

## 2021-05-24 RX ADMIN — ONDANSETRON 4 MG: 2 INJECTION INTRAMUSCULAR; INTRAVENOUS at 22:30

## 2021-05-24 RX ADMIN — ONDANSETRON 4 MG: 2 INJECTION INTRAMUSCULAR; INTRAVENOUS at 21:25

## 2021-05-24 RX ADMIN — FAMOTIDINE 20 MG: 10 INJECTION, SOLUTION INTRAVENOUS at 22:18

## 2021-05-24 RX ADMIN — IOPAMIDOL 100 ML: 755 INJECTION, SOLUTION INTRAVENOUS at 23:32

## 2021-05-24 ASSESSMENT — PAIN DESCRIPTION - DESCRIPTORS
DESCRIPTORS: BURNING
DESCRIPTORS: SHARP;BURNING;CONSTANT

## 2021-05-24 ASSESSMENT — ENCOUNTER SYMPTOMS
CHEST TIGHTNESS: 0
SHORTNESS OF BREATH: 0
EYE PAIN: 0
ABDOMINAL PAIN: 0
EYE DISCHARGE: 0
ABDOMINAL DISTENTION: 0
BACK PAIN: 0
FACIAL SWELLING: 0

## 2021-05-24 ASSESSMENT — PAIN DESCRIPTION - PAIN TYPE: TYPE: ACUTE PAIN

## 2021-05-24 ASSESSMENT — PAIN SCALES - GENERAL: PAINLEVEL_OUTOF10: 9

## 2021-05-25 ENCOUNTER — APPOINTMENT (OUTPATIENT)
Dept: ULTRASOUND IMAGING | Age: 76
End: 2021-05-25
Payer: COMMERCIAL

## 2021-05-25 VITALS
BODY MASS INDEX: 24.66 KG/M2 | SYSTOLIC BLOOD PRESSURE: 118 MMHG | DIASTOLIC BLOOD PRESSURE: 51 MMHG | HEART RATE: 71 BPM | HEIGHT: 65 IN | OXYGEN SATURATION: 100 % | WEIGHT: 148 LBS | RESPIRATION RATE: 16 BRPM | TEMPERATURE: 97.7 F

## 2021-05-25 LAB
EKG ATRIAL RATE: 54 BPM
EKG ATRIAL RATE: 56 BPM
EKG P AXIS: 60 DEGREES
EKG P AXIS: 65 DEGREES
EKG P-R INTERVAL: 124 MS
EKG P-R INTERVAL: 132 MS
EKG Q-T INTERVAL: 472 MS
EKG Q-T INTERVAL: 478 MS
EKG QRS DURATION: 88 MS
EKG QRS DURATION: 92 MS
EKG QTC CALCULATION (BAZETT): 447 MS
EKG QTC CALCULATION (BAZETT): 461 MS
EKG R AXIS: 16 DEGREES
EKG R AXIS: 9 DEGREES
EKG T AXIS: 119 DEGREES
EKG T AXIS: 97 DEGREES
EKG VENTRICULAR RATE: 54 BPM
EKG VENTRICULAR RATE: 56 BPM

## 2021-05-25 PROCEDURE — 76705 ECHO EXAM OF ABDOMEN: CPT

## 2021-05-25 PROCEDURE — 93010 ELECTROCARDIOGRAM REPORT: CPT | Performed by: INTERNAL MEDICINE

## 2021-05-25 PROCEDURE — 6370000000 HC RX 637 (ALT 250 FOR IP): Performed by: EMERGENCY MEDICINE

## 2021-05-25 RX ADMIN — ASPIRIN 324 MG: 81 TABLET, CHEWABLE ORAL at 00:07

## 2021-05-25 ASSESSMENT — PAIN DESCRIPTION - PAIN TYPE: TYPE: ACUTE PAIN

## 2021-05-25 ASSESSMENT — PAIN DESCRIPTION - LOCATION: LOCATION: CHEST

## 2021-05-25 ASSESSMENT — PAIN DESCRIPTION - ORIENTATION: ORIENTATION: MID

## 2021-05-25 NOTE — ED NOTES
O2 removed per pt request. O2 sat above 93%. O2 removed. Will continue to monitor.        Lor Alcaraz RN  05/25/21 8386

## 2021-05-25 NOTE — ED NOTES
O2 turned down to 3 LPM for O2sat of 100%. Will continue to monitor.      Clive Gusman RN  05/25/21 0005

## 2021-05-25 NOTE — ED PROVIDER NOTES
Medical Decision Making: The patient was initially seen by Dr. Farley and signed out to me after discussing the case with her. Bilirubin is 2.94 and the ALT is 108 and . She has no history of gallbladder issues. Enlarged gallbladder noted on CAT scan of abdomen and gallbladder ultrasound is ordered and pending. CONSULTS:  None    PROCEDURES:  None    FINAL IMPRESSION      1. Chest pain, unspecified type    2. Transaminitis         DISPOSITION/PLAN   DISPOSITION Ed Observation 05/25/2021 01:50:01 AM       PATIENT REFERRED TO:   No follow-up provider specified.     DISCHARGE MEDICATIONS:     New Prescriptions    No medications on file         (Please note that portions of this note were completed with a voice recognition program.  Efforts were made to edit the dictations but occasionally words are mis-transcribed.)    Christian Berg MD  Attending Emergency Physician         Christian Berg MD  05/25/21 0620

## 2021-05-25 NOTE — ED PROVIDER NOTES
EMERGENCY DEPARTMENT ENCOUNTER   ATTENDING ATTESTATION     Pt Name: Josephine Edgar  MRN: [id number]  [place] 1945  Date of evaluation: 5/25/21   Josephine Edgar is a 64 y.o. female with CC: Abdominal Pain (mid to R-sided upper abdominal pain)    MDM:   Received in signout from Dr. Anderson Greene. Chest pain and right upper quadrant pain. Bilirubin 2.94  . Enlarged gallbladder on CT, gallbladder ultrasound pending. Likely admission. Patient's gallbladder ultrasound shows wall thickening with dilated gallbladder and pericholecystic fluid possible cholelithiasis and possible cholecystitis and recommends HIDA scan. I updated patient on her results. She is asking for food. She states her \"heartburn\" is much better. Repeat abdominal exam with only mild tenderness to the right upper quadrant negative Prakash sign. Patient states she cannot stay as her  has MS and she is the only primary caretaker. I told her she would benefit from admission and surgical consult and HIDA scan. She is aware of the risks including cholangitis, sepsis and death. She is alert and oriented and has medical decision-making capacity. I did speak with her primary doctor Dr. Brendan Smith who also recommends admission and surgical consultation. Patient still does not want to be admitted. I recommended she follow-up as soon as she can with her doctor and with surgery, number provided. She is on Prilosec outpatient. Very strict return precautions given if any worsening symptoms. CRITICAL CARE:       EKG: All EKG's are interpreted by the Emergency Department Physician who either signs or Co-signs this chart in the absence of a cardiologist.      RADIOLOGY:All plain film, CT, MRI, and formal ultrasound images (except ED bedside ultrasound) are read by the radiologist, see reports below, unless otherwise noted in MDM or here.   US GALLBLADDER RUQ   Final Result   Dilated gallbladder with mild wall thickening and pericholecystic fluid. Dependent sheet like area of echogenicity, possibly representing   cholelithiasis. Findings may represent cholecystitis though sonographic   Isabela Malathi sign is reportedly negative. Consider further assessment with HIDA   scan as clinically warranted. CTA CHEST ABDOMEN PELVIS W CONTRAST   Final Result   No significant arterial abnormality evident. The exam is essentially normal   for age in this regard. Small hiatal hernia      Gallbladder hydrops. This appearance is most likely related to fasting   state. If there are symptoms of biliary colic or abnormal laboratory data,   follow-up ultrasound is recommended. Marked sigmoid diverticulosis. Evidence of old granulomatous disease. There appears to be mild chronic pulmonary interstitial disease peripherally. Bilateral total hip arthroplasties obscuring portions of low pelvis. XR ACUTE ABD SERIES CHEST 1 VW   Final Result   No acute cardiopulmonary disease. Stable biapical pleuroparenchymal scarring   from prior inflammatory/granulomatous disease. Bowel gas pattern is unremarkable. Bulky calcified plaque in the infrarenal abdominal aorta and aortic   bifurcation. LABS: All lab results were reviewed by myself, and all abnormals are listed below.   Labs Reviewed   CBC WITH AUTO DIFFERENTIAL - Abnormal; Notable for the following components:       Result Value    Seg Neutrophils 81 (*)     Lymphocytes 14 (*)     Eosinophils % 0 (*)     Immature Granulocytes 1 (*)     Segs Absolute 8.79 (*)     All other components within normal limits   COMPREHENSIVE METABOLIC PANEL W/ REFLEX TO MG FOR LOW K - Abnormal; Notable for the following components:    Glucose 134 (*)     Bun/Cre Ratio 32 (*)     Chloride 96 (*)     Alkaline Phosphatase 127 (*)      (*)      (*)     Total Bilirubin 2.94 (*)     Total Protein 8.5 (*)     All other components within normal limits   LIPASE - Abnormal; Medications    ASPIRIN 81 MG TABLET    Take 325 mg by mouth 2 times daily     ATENOLOL (TENORMIN) 25 MG TABLET    TAKE ONE TABLET BY MOUTH DAILY    BUMETANIDE (BUMEX) 0.5 MG TABLET    Take 0.5 mg by mouth daily    DICLOFENAC SODIUM (VOLTAREN) 1 % GEL    Apply 4 g topically every 4 hours as needed for Pain    GABAPENTIN (NEURONTIN) 600 MG TABLET    TAKE ONE TABLET BY MOUTH THREE TIMES A DAY FOR 90 DAYS    LEVOTHYROXINE (SYNTHROID) 100 MCG TABLET    TAKE ONE TABLET BY MOUTH DAILY    METOPROLOL SUCCINATE (TOPROL XL) 25 MG EXTENDED RELEASE TABLET    Take 25 mg by mouth daily    MULTIPLE VITAMINS-MINERALS (CENTRUM SILVER PO)    Take 1 tablet by mouth daily    NITROGLYCERIN (NITROSTAT) 0.4 MG SL TABLET    up to max of 3 total doses. If no relief after 1 dose, call 911. OMEPRAZOLE (PRILOSEC) 40 MG DELAYED RELEASE CAPSULE    TAKE ONE CAPSULE BY MOUTH DAILY    SACUBITRIL-VALSARTAN (ENTRESTO) 24-26 MG PER TABLET    Take 1 tablet by mouth 2 times daily    SIMVASTATIN (ZOCOR) 40 MG TABLET    TAKE ONE TABLET BY MOUTH DAILY    SPIRONOLACTONE (ALDACTONE) 25 MG TABLET    Take 25 mg by mouth daily    TRAZODONE (DESYREL) 50 MG TABLET    TAKE ONE TABLET BY MOUTH ONCE NIGHTLY    TRIAMCINOLONE (KENALOG) 0.025 % CREAM    Apply 1 applicator topically 2 times daily Indications: on face Apply Topically     ALLERGIES     has No Known Allergies. FAMILY HISTORY     She indicated that her mother is . She indicated that her father is . She indicated that her sister is alive. She indicated that her brother is alive. She indicated that both of her sons are alive.      SOCIAL HISTORY       Social History     Tobacco Use    Smoking status: Former Smoker     Packs/day: 0.00     Years: 30.00     Pack years: 0.00     Quit date: 2/10/2007     Years since quittin.2    Smokeless tobacco: Never Used   Vaping Use    Vaping Use: Never used   Substance Use Topics    Alcohol use: Yes     Comment: occas    Drug use: No       I personally evaluated and examined the patient in conjunction with the APC and agree with the assessment, treatment plan, and disposition of the patient as recorded by the APC.    Eddie Barnes MD  Attending Emergency Physician         Eddie Barnes MD  05/25/21 2595       Eddie Barnes MD  05/25/21 0046

## 2021-05-25 NOTE — ED PROVIDER NOTES
EMERGENCY DEPARTMENT ENCOUNTER    Pt Name: Sushil Engel  MRN: [id number]  [place] 1945  Date of evaluation: 5/24/21  CHIEF COMPLAINT       Chief Complaint   Patient presents with    Heartburn     onset this am, took maalox no improvement     HISTORY OF PRESENT ILLNESS   HPI   Patient is a 78-year-old female with a history of severe heartburn who presented to the emergency room secondary to heartburn. Patient complains of a same day history of heartburn start if she ate breakfast cereal milk which she eats daily. Patient stated she attempted to take her medications but continued to have a heartburn sensation she rates the pain 10 out of 10 on the pain scale some nausea no vomiting. She has a previous history of heartburn but has been well controlled. No recent EGD or colonoscopy. Patient denies nausea, vomiting, fevers or chills    REVIEW OF SYSTEMS     Review of Systems   Constitutional: Negative for chills, diaphoresis and fever. HENT: Negative for congestion, ear pain and facial swelling. Eyes: Negative for pain, discharge and visual disturbance. Respiratory: Negative for chest tightness and shortness of breath. Cardiovascular: Negative for chest pain and palpitations. Gastrointestinal: Negative for abdominal distention and abdominal pain. Heartburn   Genitourinary: Negative for difficulty urinating and flank pain. Musculoskeletal: Negative for back pain. Skin: Negative for wound. Neurological: Negative for dizziness, light-headedness and headaches.      PASTMEDICAL HISTORY     Past Medical History:   Diagnosis Date    Arthritis     CAD (coronary artery disease)     Elevated liver enzymes     Esophageal reflux     Foot drop, right foot     Heart [facility])     patient does not know when    History of Clostridium difficile infection     treated on 08/12/17    HL (hearing loss)     Hyperlipidemia     Hypertension     Hypothyroidism     Lumbar radiculopathy     Osteoarthritis     Osteopenia     Psychiatric problem     Tremor     hand     SURGICAL HISTORY       Past Surgical History:   Procedure Laterality Date    BLADDER SUSPENSION      CARDIAC CATHETERIZATION  01/2015, 9/2017    COLONOSCOPY  2012    COLONOSCOPY  08/04/2017    PSEUDOMEMBRANOUS COLITIS WITH MUCOSAL ISCHEMIA, C DIFF POS    DILATION AND CURETTAGE OF UTERUS      JOINT REPLACEMENT Right [id number]    hip-complicated with rt drop foot-Dr. Contreras Mons JOINT REPLACEMENT Left 2017    hip-Dr. Ada Borrero    ID COLONOSCOPY W/BIOPSY SINGLE/MULTIPLE N/A 8/4/2017    COLONOSCOPY WITH BIOPSY performed by Malachi Taylor MD at [address] EGD TRANSORAL BIOPSY SINGLE/MULTIPLE N/A 8/4/2017    EGD BIOPSY performed by Malachi Taylor MD at [address] Left 03/07/2017    UPPER GASTROINTESTINAL ENDOSCOPY  08/04/2017    MILD CHRONIC GASTRITIS      CURRENT MEDICATIONS       Previous Medications    ASPIRIN 81 MG TABLET    Take 325 mg by mouth 2 times daily     ATENOLOL (TENORMIN) 25 MG TABLET    TAKE ONE TABLET BY MOUTH DAILY    BUMETANIDE (BUMEX) 0.5 MG TABLET    Take 0.5 mg by mouth daily    DICLOFENAC SODIUM (VOLTAREN) 1 % GEL    Apply 4 g topically every 4 hours as needed for Pain    GABAPENTIN (NEURONTIN) 600 MG TABLET    TAKE ONE TABLET BY MOUTH THREE TIMES A DAY FOR 90 DAYS    LEVOTHYROXINE (SYNTHROID) 100 MCG TABLET    TAKE ONE TABLET BY MOUTH DAILY    METOPROLOL SUCCINATE (TOPROL XL) 25 MG EXTENDED RELEASE TABLET    Take 25 mg by mouth daily    MULTIPLE VITAMINS-MINERALS (CENTRUM SILVER PO)    Take 1 tablet by mouth daily    NITROGLYCERIN (NITROSTAT) 0.4 MG SL TABLET    up to max of 3 total doses. If no relief after 1 dose, call 911.     OMEPRAZOLE (PRILOSEC) 40 MG DELAYED RELEASE CAPSULE    TAKE ONE CAPSULE BY MOUTH DAILY    SACUBITRIL-VALSARTAN (ENTRESTO) 24-26 MG PER TABLET    Take 1 tablet by mouth 2 times daily    SIMVASTATIN (ZOCOR) 40 MG and oriented to person, place, and time. MEDICAL DECISION MAKING:   The patient is a 56-year-old female who presented to the emergency department secondary to heartburn. EKG obtained on arrival normal sinus rhythm no acute ST wave elevations or findings consistent with STEMI. Labs will be obtained including troponin patient received Protonix and Zofran and will be reevaluated. Reevaluated continued to complain of a sensation of heartburn orders placed for Pepcid as well as morphine, Zofran and aspirin. Nursing staff noted ST depressions on the monitor repeat EKG was obtained with some lateral lead changes no acute STEMI changes. Patient's cardiologist Dr. Christine Arshad was contacted revealed EKG. shows troponin XVII, slight elevation and hepatic enzymes. Given patient's continued pain orders placed for CTA rule out dissection. At this time imaging pending, patient will be endorsed to Dr. Penny Swain please see his note for final ED disposition. All patient's question's and concerns were answered prior to disposition and patient and/or family expressed understanding and agreement of treatment plan. CRITICAL CARE:              NIH STROKE SCALE:            PROCEDURES:    Procedures    DIAGNOSTIC RESULTS   EKG:All EKG's are interpreted by the Emergency Department Physician who either signs or Co-signs this chart in the absence of a cardiologist.        RADIOLOGY:All plain film, CT, MRI, and formal ultrasound images (except ED bedside ultrasound) are read by the radiologist, see reports below, unless otherwisenoted in MDM or here. XR ACUTE ABD SERIES CHEST 1 VW   Final Result   No acute cardiopulmonary disease. Stable biapical pleuroparenchymal scarring   from prior inflammatory/granulomatous disease. Bowel gas pattern is unremarkable. Bulky calcified plaque in the infrarenal abdominal aorta and aortic   bifurcation.          CTA CHEST ABDOMEN PELVIS W CONTRAST    (Results Pending)     LABS: All lab results were reviewed by myself, and all abnormals are listed below.   Labs Reviewed   CBC WITH AUTO DIFFERENTIAL - Abnormal; Notable for the following components:       Result Value    Seg Neutrophils 81 (*)     Lymphocytes 14 (*)     Eosinophils % 0 (*)     Immature Granulocytes 1 (*)     Segs Absolute 8.79 (*)     All other components within normal limits   COMPREHENSIVE METABOLIC PANEL W/ REFLEX TO MG FOR LOW K - Abnormal; Notable for the following components:    Glucose 134 (*)     Bun/Cre Ratio 32 (*)     Chloride 96 (*)     Alkaline Phosphatase 127 (*)      (*)      (*)     Total Bilirubin 2.94 (*)     Total Protein 8.5 (*)     All other components within normal limits   LIPASE - Abnormal; Notable for the following components:    Lipase 84 (*)     All other components within normal limits   TROPONIN - Abnormal; Notable for the following components:    Troponin, High Sensitivity 19 (*)     All other components within normal limits   TROPONIN - Abnormal; Notable for the following components:    Troponin, High Sensitivity 18 (*)     All other components within normal limits   AMYLASE       EMERGENCY DEPARTMENTCOURSE:         Vitals:    Vitals:    05/24/21 2218 05/24/21 2222 05/24/21 2230 05/24/21 2300   BP: (!) 114/57  126/60 123/63   Pulse: 71 72 75 82   Resp: 16 19 18 23   Temp:       TempSrc:       SpO2: 96% (!) 85% 99% 94%   Weight:       Height:           The patient was given the following medications while in the emergency department:  Orders Placed This Encounter   Medications    ondansetron (ZOFRAN) injection 4 mg    pantoprazole (PROTONIX) injection 40 mg    DISCONTD: aluminum & magnesium hydroxide-simethicone (MAALOX) 30 mL, lidocaine viscous hcl (XYLOCAINE) 5 mL (GI COCKTAIL)    famotidine (PEPCID) injection 20 mg    pantoprazole (PROTONIX) injection 40 mg    morphine (PF) injection 2 mg    ondansetron (ZOFRAN) injection 4 mg    0.9 % sodium chloride bolus    sodium chloride flush 0.9 % injection 10 mL    iopamidol (ISOVUE-370) 76 % injection 100 mL    aspirin chewable tablet 324 mg     CONSULTS:  None    FINAL IMPRESSION      1. Chest pain, unspecified type    2. Transaminitis          DISPOSITION/PLAN   DISPOSITION        PATIENT REFERRED TO:  No follow-up provider specified. DISCHARGE MEDICATIONS:  New Prescriptions    No medications on file     Oretha Ormond, MD  Attending Emergency Physician    This note was created with the assistance of a speech-recognition program. While intending to generate a document that actually reflects the content of the visit, no guarantees can be provided that every mistake has been identified and corrected by editing.                     Oretha Ormond, MD  60/89/67 95 Jt Goodson MD  16/69/87 5003

## 2021-05-25 NOTE — ED NOTES
O2 applied at 2 LPM for O2sat of 87%. Will continue to monitor.       Yoana Almazan RN  05/25/21 [id number]

## 2021-06-16 ENCOUNTER — VIRTUAL VISIT (OUTPATIENT)
Dept: FAMILY MEDICINE CLINIC | Age: 76
End: 2021-06-16
Payer: COMMERCIAL

## 2021-06-16 DIAGNOSIS — M54.2 NECK PAIN: ICD-10-CM

## 2021-06-16 DIAGNOSIS — K80.20 GALLSTONES: Primary | ICD-10-CM

## 2021-06-16 PROCEDURE — 99443 PR PHYS/QHP TELEPHONE EVALUATION 21-30 MIN: CPT | Performed by: FAMILY MEDICINE

## 2021-06-16 RX ORDER — TIZANIDINE 4 MG/1
4 TABLET ORAL 4 TIMES DAILY PRN
Qty: 40 TABLET | Refills: 1 | Status: SHIPPED | OUTPATIENT
Start: 2021-06-16 | End: 2021-08-02

## 2021-06-16 SDOH — ECONOMIC STABILITY: FOOD INSECURITY: WITHIN THE PAST 12 MONTHS, YOU WORRIED THAT YOUR FOOD WOULD RUN OUT BEFORE YOU GOT MONEY TO BUY MORE.: NEVER TRUE

## 2021-06-16 SDOH — ECONOMIC STABILITY: FOOD INSECURITY: WITHIN THE PAST 12 MONTHS, THE FOOD YOU BOUGHT JUST DIDN'T LAST AND YOU DIDN'T HAVE MONEY TO GET MORE.: NEVER TRUE

## 2021-06-16 ASSESSMENT — ENCOUNTER SYMPTOMS
DIARRHEA: 0
ABDOMINAL PAIN: 0
CONSTIPATION: 0
EYES NEGATIVE: 1
COUGH: 0
BLOOD IN STOOL: 0
ALLERGIC/IMMUNOLOGIC NEGATIVE: 1
SHORTNESS OF BREATH: 0

## 2021-06-16 ASSESSMENT — SOCIAL DETERMINANTS OF HEALTH (SDOH): HOW HARD IS IT FOR YOU TO PAY FOR THE VERY BASICS LIKE FOOD, HOUSING, MEDICAL CARE, AND HEATING?: NOT HARD AT ALL

## 2021-06-16 NOTE — PROGRESS NOTES
[facility]  [facility]  [address]  [facility] [address]  Dept: [phone]    6/16/2021    Consent:  She and/or health care decision maker is aware that that she may receive a bill for this telephone service, depending on her insurance coverage, and has provided verbal consent to proceed: Yes    CHIEF Geri Lopes is a 64 y.o. female evaluated via telephone on 6/16/2021. HPI    Kajal Lee is a 64 y.o. female who presents   Chief Complaint   Patient presents with    Follow-Up from Hospital     gallbladder   . Follow up after ED visit for epigastric pain, heartburn. Was told she had Gallstones but did not want to stay due to her 's health. Has a HIDA scan scheduled. She does not want to have surgery. She is aware that she could have acute sx if gb ruptures. She is following low fat diet. Taking ppi daily on empty stomach daily. She did see a surgeon who advised she may have passed a stone. Will discuss with surgeon and cardiologist as to whether she should have surgery. Having new onset neck without radiation. Taking tylenol arthritis which is not helpful today. There were no vitals filed for this visit. REVIEW OF SYSTEMS    Review of Systems   Constitutional: Negative for fatigue, fever and unexpected weight change. HENT: Negative. Eyes: Negative. Respiratory: Negative for cough and shortness of breath. Cardiovascular: Negative for chest pain and leg swelling. Gastrointestinal: Negative for abdominal pain, blood in stool, constipation and diarrhea. See hpi   Endocrine: Negative. Genitourinary: Negative for frequency and urgency. Musculoskeletal: Positive for neck pain. Skin: Negative. Allergic/Immunologic: Negative. Neurological: Negative for dizziness and headaches. Hematological: Negative. Psychiatric/Behavioral: Negative for sleep disturbance.  The patient is not nervous/anxious. PAST MEDICAL HISTORY    Past Medical History:   Diagnosis Date    Arthritis     CAD (coronary artery disease)     Elevated liver enzymes     Esophageal reflux     Foot drop, right foot     Heart attack ([facility] [address]     patient does not know when    History of Clostridium difficile infection     treated on 17    HL (hearing loss)     Hyperlipidemia     Hypertension     Hypothyroidism     Lumbar radiculopathy     Osteoarthritis     Osteopenia     Psychiatric problem     Tremor     hand       FAMILY HISTORY    Family History   Problem Relation Age of Onset    Heart Disease Mother     Heart Disease Father     Arthritis Father     Heart Disease Brother        SOCIAL HISTORY    Social History     Socioeconomic History    Marital status:      Spouse name: Not on file    Number of children: Not on file    Years of education: Not on file    Highest education level: Not on file   Occupational History    Not on file   Tobacco Use    Smoking status: Former Smoker     Packs/day: 0.00     Years: 30.00     Pack years: 0.00     Quit date: 2/10/2007     Years since quittin.3    Smokeless tobacco: Never Used   Vaping Use    Vaping Use: Never used   Substance and Sexual Activity    Alcohol use: Yes     Comment: occas    Drug use: No    Sexual activity: Not on file   Other Topics Concern    Not on file   Social History Narrative    Not on file     Social Determinants of Health     Financial Resource Strain: Low Risk     Difficulty of Paying Living Expenses: Not hard at all   Food Insecurity: No Food Insecurity    Worried About Running Out of Food in the Last Year: Never true    Joy of Food in the Last Year: Never true   Transportation Needs:     Lack of Transportation (Medical):      Lack of Transportation (Non-Medical):    Physical Activity:     Days of Exercise per Week:     Minutes of Exercise per Session:    Stress:     Feeling of Stress :    Social Connections:     Frequency of Communication with Friends and Family:     Frequency of Social Gatherings with Friends and Family:     Attends Rastafarian Services:     Active Member of Clubs or Organizations:     Attends Club or Organization Meetings:     Marital Status:    Intimate Partner Violence:     Fear of Current or Ex-Partner:     Emotionally Abused:     Physically Abused:     Sexually Abused:        SURGICAL HISTORY    Past Surgical History:   Procedure Laterality Date    BLADDER SUSPENSION      CARDIAC CATHETERIZATION  01/2015, 9/2017    COLONOSCOPY  2012    COLONOSCOPY  08/04/2017    PSEUDOMEMBRANOUS COLITIS WITH MUCOSAL ISCHEMIA, C DIFF POS    DILATION AND CURETTAGE OF UTERUS      JOINT REPLACEMENT Right 0061    hip-complicated with rt drop foot-Dr.    [facility] JOINT REPLACEMENT Left 2017    hip-Dr. Butler Speak    RI COLONOSCOPY W/BIOPSY SINGLE/MULTIPLE N/A 8/4/2017    COLONOSCOPY WITH BIOPSY performed by Claudia Oliva MD at [address] EGD TRANSORAL BIOPSY SINGLE/MULTIPLE N/A 8/4/2017    EGD BIOPSY performed by Claudia Oliva MD at [address] Left 03/07/2017    UPPER GASTROINTESTINAL ENDOSCOPY  08/04/2017    MILD CHRONIC GASTRITIS        CURRENT MEDICATIONS    Current Outpatient Medications   Medication Sig Dispense Refill    tiZANidine (ZANAFLEX) 4 MG tablet Take 1 tablet by mouth 4 times daily as needed (muscle spasm) 40 tablet 1    traZODone (DESYREL) 50 MG tablet TAKE ONE TABLET BY MOUTH ONCE NIGHTLY 30 tablet 5    metoprolol succinate (TOPROL XL) 25 MG extended release tablet Take 25 mg by mouth daily      spironolactone (ALDACTONE) 25 MG tablet Take 25 mg by mouth daily      bumetanide (BUMEX) 0.5 MG tablet Take 0.5 mg by mouth daily      sacubitril-valsartan (ENTRESTO) 24-26 MG per tablet Take 1 tablet by mouth 2 times daily      omeprazole (PRILOSEC) 40 MG delayed release capsule TAKE ONE CAPSULE BY MOUTH DAILY 30 capsule 5    simvastatin (ZOCOR) 40 MG tablet TAKE ONE TABLET BY MOUTH DAILY 90 tablet 3    levothyroxine (SYNTHROID) 100 MCG tablet TAKE ONE TABLET BY MOUTH DAILY 90 tablet 2    gabapentin (NEURONTIN) 600 MG tablet TAKE ONE TABLET BY MOUTH THREE TIMES A DAY FOR 90 DAYS 270 tablet 2    diclofenac sodium (VOLTAREN) 1 % GEL Apply 4 g topically every 4 hours as needed for Pain 1 Tube 5    atenolol (TENORMIN) 25 MG tablet TAKE ONE TABLET BY MOUTH DAILY 90 tablet 2    triamcinolone (KENALOG) 0.025 % cream Apply 1 applicator topically 2 times daily Indications: on face Apply Topically      aspirin 81 MG tablet Take 325 mg by mouth 2 times daily       Multiple Vitamins-Minerals (CENTRUM SILVER PO) Take 1 tablet by mouth daily      nitroGLYCERIN (NITROSTAT) 0.4 MG SL tablet up to max of 3 total doses. If no relief after 1 dose, call 911. (Patient not taking: Reported on 6/16/2021) 25 tablet 3     No current facility-administered medications for this visit. ALLERGIES    No Known Allergies    PHYSICAL EXAM   Physical Exam  Vitals reviewed. Neurological:      Mental Status: She is alert and oriented to person, place, and time. Psychiatric:         Mood and Affect: Mood normal.         Behavior: Behavior normal.         Thought Content: Thought content normal.         Judgment: Judgment normal.         Ally received counseling on the following healthy behaviors: nutrition, exercise and medication adherence  Reviewed prior labs and health maintenance. Continue current medications, diet and exercise. Discussed use, benefit, and side effects of prescribed medications. Barriers to medication compliance addressed. Patient given educational materials - see patient instructions. All patient questions answered. Patient voiced understanding. ASSESSMENT/PLAN    1. Gallstones  Chronic with no acute symptoms currently.   Get HIDA scan done as planned and then follow-up with surgeon and cardiologist as to whether surgery would be appropriate    2. Neck pain  Trial of tizanidine along with Tylenol and gentle activity. Call if symptoms are worsening.  - tiZANidine (ZANAFLEX) 4 MG tablet; Take 1 tablet by mouth 4 times daily as needed (muscle spasm)  Dispense: 40 tablet; Refill: 1       Return if symptoms worsen or fail to improve. Documentation:  I communicated with the patient and/or health care decision maker about see above. Details of this discussion including any medical advice provided: See above    I affirm this is a Patient Initiated Episode with an Established Patient who has not had a related appointment within my department in the past 7 days or scheduled within the next 24 hours.     Total Time: minutes: 21-30 minutes    Note: not billable if this call serves to triage the patient into an appointment for the relevant concern    (Please note that portions of this note were completed with a voice recognition program. Efforts were made to edit the dictations but occasionally words are mis-transcribed.)     Electronically signed by González Carrillo MD on 6/16/21 at 10:05 AM JOHNSON

## 2021-06-18 ENCOUNTER — HOSPITAL ENCOUNTER (OUTPATIENT)
Dept: NUCLEAR MEDICINE | Age: 76
Discharge: HOME | End: 2021-06-20
Payer: COMMERCIAL

## 2021-06-18 VITALS — WEIGHT: 147.93 LBS | BODY MASS INDEX: 24.62 KG/M2

## 2021-06-18 DIAGNOSIS — R10.9 ABDOMINAL PAIN, UNSPECIFIED ABDOMINAL LOCATION: ICD-10-CM

## 2021-06-18 PROCEDURE — 3430000000 HC RX DIAGNOSTIC RADIOPHARMACEUTICAL: Performed by: SURGERY

## 2021-06-18 PROCEDURE — A9537 TC99M MEBROFENIN: HCPCS | Performed by: SURGERY

## 2021-06-18 PROCEDURE — 78227 HEPATOBIL SYST IMAGE W/DRUG: CPT

## 2021-06-18 RX ADMIN — Medication 4.7 MILLICURIE: at 10:05

## 2021-06-26 DIAGNOSIS — E03.9 HYPOTHYROIDISM, UNSPECIFIED TYPE: ICD-10-CM

## 2021-06-26 RX ORDER — LEVOTHYROXINE SODIUM 0.1 MG/1
TABLET ORAL
Qty: 90 TABLET | Refills: 3 | Status: SHIPPED | OUTPATIENT
Start: 2021-06-26 | End: 2022-06-23

## 2021-06-28 DIAGNOSIS — M51.36 DDD (DEGENERATIVE DISC DISEASE), LUMBAR: ICD-10-CM

## 2021-06-28 DIAGNOSIS — M15.9 PRIMARY OSTEOARTHRITIS INVOLVING MULTIPLE JOINTS: ICD-10-CM

## 2021-06-28 RX ORDER — GABAPENTIN 600 MG/1
TABLET ORAL
Qty: 270 TABLET | Refills: 1 | Status: SHIPPED | OUTPATIENT
Start: 2021-06-28 | End: 2022-01-10

## 2021-08-02 DIAGNOSIS — M54.2 NECK PAIN: ICD-10-CM

## 2021-08-02 RX ORDER — TIZANIDINE 4 MG/1
TABLET ORAL
Qty: 40 TABLET | Refills: 0 | Status: SHIPPED | OUTPATIENT
Start: 2021-08-02 | End: 2021-08-19

## 2021-08-02 NOTE — TELEPHONE ENCOUNTER
Pharm requested    Health Maintenance   Topic Date Due    Hepatitis C screen  Never done    Annual Wellness Visit (AWV)  Never done    DTaP/Tdap/Td vaccine (2 - Td or Tdap) 04/04/2021    Shingles Vaccine (1 of 2) 10/10/2024 (Originally 3/21/1995)    Flu vaccine (1) 09/01/2021    Lipid screen  01/03/2022    TSH testing  01/03/2022    Potassium monitoring  05/24/2022    Creatinine monitoring  05/24/2022    DEXA (modify frequency per FRAX score)  Completed    Pneumococcal 65+ years Vaccine  Completed    COVID-19 Vaccine  Completed    Hepatitis A vaccine  Aged Out    Hepatitis B vaccine  Aged Out    Hib vaccine  Aged Out    Meningococcal (ACWY) vaccine  Aged Out             (applicable per patient's age: Cancer Screenings, Depression Screening, Fall Risk Screening, Immunizations)    LDL Cholesterol (mg/dL)   Date Value   01/03/2021 65     AST (U/L)   Date Value   05/24/2021 165 (H)     ALT (U/L)   Date Value   05/24/2021 108 (H)     BUN (mg/dL)   Date Value   05/24/2021 23      (goal A1C is < 7)   (goal LDL is <100) need 30-50% reduction from baseline     BP Readings from Last 3 Encounters:   05/25/21 (!) 118/51   04/02/21 128/60   01/29/21 (!) 156/90    (goal /80)      All Future Testing planned in [facility]:  Lab Frequency Next Occurrence       Next Visit Date:  Future Appointments   Date Time Provider Jovany Chandler   10/1/2021  1:00 PM [facility]            Patient Active Problem List:     Essential hypertension     Fatigue     Acquired hypothyroidism     Right foot drop     NSVT (nonsustained ventricular tachycardia) (HCC)     Sigmoid diverticulosis     Primary osteoarthritis involving multiple joints     Mixed hyperlipidemia     Coronary artery disease involving native coronary artery of native heart with angina pectoris (HCC)     Esophageal reflux     Elevated liver enzymes     Chest pain     Troponin level elevated     Transaminasemia     Anemia, normocytic normochromic

## 2021-08-19 DIAGNOSIS — M54.2 NECK PAIN: ICD-10-CM

## 2021-08-19 RX ORDER — TIZANIDINE 4 MG/1
TABLET ORAL
Qty: 40 TABLET | Refills: 0 | Status: SHIPPED | OUTPATIENT
Start: 2021-08-19 | End: 2022-04-04

## 2021-08-25 RX ORDER — OMEPRAZOLE 40 MG/1
CAPSULE, DELAYED RELEASE ORAL
Qty: 30 CAPSULE | Refills: 5 | Status: SHIPPED | OUTPATIENT
Start: 2021-08-25 | End: 2022-02-09

## 2021-10-01 ENCOUNTER — TELEPHONE (OUTPATIENT)
Dept: FAMILY MEDICINE CLINIC | Age: 76
End: 2021-10-01

## 2021-10-01 DIAGNOSIS — M51.36 DDD (DEGENERATIVE DISC DISEASE), LUMBAR: ICD-10-CM

## 2021-10-01 DIAGNOSIS — M15.9 PRIMARY OSTEOARTHRITIS INVOLVING MULTIPLE JOINTS: ICD-10-CM

## 2021-10-01 RX ORDER — HYDROCODONE BITARTRATE AND ACETAMINOPHEN 5; 325 MG/1; MG/1
1 TABLET ORAL EVERY 8 HOURS PRN
Qty: 30 TABLET | Refills: 0 | Status: SHIPPED | OUTPATIENT
Start: 2021-10-01 | End: 2021-10-18 | Stop reason: SDUPTHER

## 2021-10-01 NOTE — TELEPHONE ENCOUNTER
Patient called in requesting pain medication for left hip pain. Does patient need appointment before prescribed? Please advise thank you.

## 2021-10-01 NOTE — TELEPHONE ENCOUNTER
Patient stated she did not get a reminder call for her appointment today pt has rescheduled for Monday 10/04/2021.  Is requesting pain medication for her left hip please send to [facility] on [address]

## 2021-10-04 ENCOUNTER — HOSPITAL ENCOUNTER (OUTPATIENT)
Age: 76
Setting detail: SPECIMEN
Discharge: HOME | End: 2021-10-04
Payer: COMMERCIAL

## 2021-10-04 ENCOUNTER — OFFICE VISIT (OUTPATIENT)
Dept: FAMILY MEDICINE CLINIC | Age: 76
End: 2021-10-04
Payer: COMMERCIAL

## 2021-10-04 VITALS
HEART RATE: 73 BPM | TEMPERATURE: 97.5 F | OXYGEN SATURATION: 98 % | DIASTOLIC BLOOD PRESSURE: 60 MMHG | BODY MASS INDEX: 23.26 KG/M2 | WEIGHT: 139.6 LBS | HEIGHT: 65 IN | SYSTOLIC BLOOD PRESSURE: 130 MMHG

## 2021-10-04 DIAGNOSIS — R74.8 ELEVATED LIVER ENZYMES: ICD-10-CM

## 2021-10-04 DIAGNOSIS — S80.861A INSECT BITE OF RIGHT LOWER LEG, INITIAL ENCOUNTER: ICD-10-CM

## 2021-10-04 DIAGNOSIS — K80.20 GALLSTONES: ICD-10-CM

## 2021-10-04 DIAGNOSIS — Z11.59 ENCOUNTER FOR HEPATITIS C SCREENING TEST FOR LOW RISK PATIENT: ICD-10-CM

## 2021-10-04 DIAGNOSIS — I47.29 NSVT (NONSUSTAINED VENTRICULAR TACHYCARDIA): ICD-10-CM

## 2021-10-04 DIAGNOSIS — I50.32 CHRONIC DIASTOLIC CONGESTIVE HEART FAILURE (HCC): ICD-10-CM

## 2021-10-04 DIAGNOSIS — E03.9 HYPOTHYROIDISM, UNSPECIFIED TYPE: ICD-10-CM

## 2021-10-04 DIAGNOSIS — Z23 FLU VACCINE NEED: ICD-10-CM

## 2021-10-04 DIAGNOSIS — R10.84 ABDOMINAL PAIN, GENERALIZED: ICD-10-CM

## 2021-10-04 DIAGNOSIS — M15.9 PRIMARY OSTEOARTHRITIS INVOLVING MULTIPLE JOINTS: ICD-10-CM

## 2021-10-04 DIAGNOSIS — R74.8 ELEVATED LIPASE: ICD-10-CM

## 2021-10-04 DIAGNOSIS — I10 ESSENTIAL HYPERTENSION: Primary | ICD-10-CM

## 2021-10-04 DIAGNOSIS — I10 ESSENTIAL HYPERTENSION: ICD-10-CM
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
## 2021-10-04 LAB
ABSOLUTE EOS #: 0.26 K/UL (ref 0–0.44)
ABSOLUTE IMMATURE GRANULOCYTE: 0.03 K/UL (ref 0–0.3)
ABSOLUTE LYMPH #: 2.62 K/UL (ref 1.1–3.7)
ABSOLUTE MONO #: 0.72 K/UL (ref 0.1–1.2)
BASOPHILS # BLD: 1 % (ref 0–2)
BASOPHILS ABSOLUTE: 0.06 K/UL (ref 0–0.2)
DIFFERENTIAL TYPE: ABNORMAL
EOSINOPHILS RELATIVE PERCENT: 3 % (ref 1–4)
HCT VFR BLD CALC: 37.1 % (ref 36.3–47.1)
HEMOGLOBIN: 12.1 G/DL (ref 11.9–15.1)
IMMATURE GRANULOCYTES: 0 %
LYMPHOCYTES # BLD: 30 % (ref 24–43)
MCH RBC QN AUTO: 33.5 PG (ref 25.2–33.5)
MCHC RBC AUTO-ENTMCNC: 32.6 G/DL (ref 28.4–34.8)
MCV RBC AUTO: 102.8 FL (ref 82.6–102.9)
MONOCYTES # BLD: 8 % (ref 3–12)
NRBC AUTOMATED: 0 PER 100 WBC
PDW BLD-RTO: 12.5 % (ref 11.8–14.4)
PLATELET # BLD: 316 K/UL (ref 138–453)
PLATELET ESTIMATE: ABNORMAL
PMV BLD AUTO: 10.5 FL (ref 8.1–13.5)
RBC # BLD: 3.61 M/UL (ref 3.95–5.11)
RBC # BLD: ABNORMAL 10*6/UL
SEG NEUTROPHILS: 58 % (ref 36–65)
SEGMENTED NEUTROPHILS ABSOLUTE COUNT: 5.11 K/UL (ref 1.5–8.1)
WBC # BLD: 8.8 K/UL (ref 3.5–11.3)
WBC # BLD: ABNORMAL 10*3/UL

## 2021-10-04 PROCEDURE — G0008 ADMIN INFLUENZA VIRUS VAC: HCPCS | Performed by: FAMILY MEDICINE

## 2021-10-04 PROCEDURE — 99214 OFFICE O/P EST MOD 30 MIN: CPT | Performed by: FAMILY MEDICINE

## 2021-10-04 PROCEDURE — 36415 COLL VENOUS BLD VENIPUNCTURE: CPT | Performed by: FAMILY MEDICINE

## 2021-10-04 PROCEDURE — 90694 VACC AIIV4 NO PRSRV 0.5ML IM: CPT | Performed by: FAMILY MEDICINE

## 2021-10-04 ASSESSMENT — ENCOUNTER SYMPTOMS
CONSTIPATION: 0
COUGH: 0
ALLERGIC/IMMUNOLOGIC NEGATIVE: 1
BLOOD IN STOOL: 0
SHORTNESS OF BREATH: 0
ABDOMINAL PAIN: 0
DIARRHEA: 0
EYES NEGATIVE: 1

## 2021-10-04 NOTE — PROGRESS NOTES
[facility]  [facility]  [address]  [place] [address]  Dept: [phone]    10/4/2021    CHIEF COMPLAINT    Chief Complaint   Patient presents with    Hypertension       HPI    Naomi Hagan is a 64 y.o. female who presents   Chief Complaint   Patient presents with    Hypertension   . Recheck chronic conditions including htn, heart failure. Had an ED visit in may with elevated liver enzymes, dilated GB. She refused admission. Was having chest pain, heart burn. Elevated lipase and liver enzymes. Sees  for heart failure. Is taking 2 BB, atenolol and metoprolol. Hypertension  This is a chronic problem. The current episode started more than 1 year ago. Pertinent negatives include no chest pain, headaches, palpitations, peripheral edema or shortness of breath. Vitals:    10/04/21 1351   BP: 130/60   Pulse: 73   Temp: 97.5 °F (36.4 °C)   SpO2: 98%   Weight: 139 lb 9.6 oz (63.3 kg)   Height: 5' 5\" (1.651 m)       REVIEW OF SYSTEMS    Review of Systems   Constitutional: Negative for fatigue, fever and unexpected weight change. HENT: Negative. Eyes: Negative. Respiratory: Negative for cough and shortness of breath. Cardiovascular: Positive for leg swelling (mild). Negative for chest pain and palpitations. Gastrointestinal: Negative for abdominal pain, blood in stool, constipation and diarrhea. Endocrine: Negative. Genitourinary: Negative for frequency and urgency. Musculoskeletal: Positive for gait problem (rt drop foot, wears a brace). Skin: Negative. Spider bite rt lower leg leaving red stain   Allergic/Immunologic: Negative. Neurological: Negative for dizziness and headaches. Hematological: Negative. Psychiatric/Behavioral: Negative for sleep disturbance. The patient is not nervous/anxious.         PAST MEDICAL HISTORY    Past Medical History:   Diagnosis Date    Arthritis     CAD (coronary artery disease)     Elevated liver enzymes     Esophageal reflux     Foot drop, right foot     Heart attack ([address]     patient does not know when    History of Clostridium difficile infection     treated on 17    HL (hearing loss)     Hyperlipidemia     Hypertension     Hypothyroidism     Lumbar radiculopathy     Osteoarthritis     Osteopenia     Psychiatric problem     Tremor     hand       FAMILY HISTORY    Family History   Problem Relation Age of Onset    Heart Disease Mother     Heart Disease Father     Arthritis Father     Heart Disease Brother        SOCIAL HISTORY    Social History     Socioeconomic History    Marital status:      Spouse name: None    Number of children: 2    Years of education: None    Highest education level: None   Occupational History    None   Tobacco Use    Smoking status: Former Smoker     Packs/day: 0.00     Years: 30.00     Pack years: 0.00     Quit date: 2/10/2007     Years since quittin.6    Smokeless tobacco: Never Used   Vaping Use    Vaping Use: Never used   Substance and Sexual Activity    Alcohol use: Yes     Comment: occas    Drug use: No    Sexual activity: None   Other Topics Concern    None   Social History Narrative    None     Social Determinants of Health     Financial Resource Strain: Low Risk     Difficulty of Paying Living Expenses: Not hard at all   Food Insecurity: No Food Insecurity    Worried About Running Out of Food in the Last Year: Never true    Joy of Food in the Last Year: Never true   Transportation Needs:     Lack of Transportation (Medical):      Lack of Transportation (Non-Medical):    Physical Activity:     Days of Exercise per Week:     Minutes of Exercise per Session:    Stress:     Feeling of Stress :    Social Connections:     Frequency of Communication with Friends and Family:     Frequency of Social Gatherings with Friends and Family:     Attends Zoroastrian Services:     Active Member of Clubs or Organizations:    [address] [employer] or Organization Meetings:     Marital Status:    Intimate Partner Violence:     Fear of Current or Ex-Partner:     Emotionally Abused:     Physically Abused:     Sexually Abused:        SURGICAL HISTORY    Past Surgical History:   Procedure Laterality Date    BLADDER SUSPENSION      CARDIAC CATHETERIZATION  01/2015, 9/2017    COLONOSCOPY  2012    COLONOSCOPY  08/04/2017    PSEUDOMEMBRANOUS COLITIS WITH MUCOSAL ISCHEMIA, C DIFF POS    DILATION AND CURETTAGE OF UTERUS      JOINT REPLACEMENT Right [id number]    hip-complicated with rt drop foot-Dr. Cain JOINT REPLACEMENT Left 2017    hip-Dr. Abdullahi Reeder    CT COLONOSCOPY W/BIOPSY SINGLE/MULTIPLE N/A 8/4/2017    COLONOSCOPY WITH BIOPSY performed by Melissa Joiner MD at [address] EGD TRANSORAL BIOPSY SINGLE/MULTIPLE N/A 8/4/2017    EGD BIOPSY performed by Melissa Joiner MD at [address] Left 03/07/2017    UPPER GASTROINTESTINAL ENDOSCOPY  08/04/2017    MILD CHRONIC GASTRITIS        CURRENT MEDICATIONS    Current Outpatient Medications   Medication Sig Dispense Refill    HYDROcodone-acetaminophen (NORCO) 5-325 MG per tablet Take 1 tablet by mouth every 8 hours as needed for Pain for up to 10 days.  30 tablet 0    omeprazole (PRILOSEC) 40 MG delayed release capsule TAKE ONE CAPSULE BY MOUTH DAILY 30 capsule 5    tiZANidine (ZANAFLEX) 4 MG tablet TAKE ONE TABLET BY MOUTH FOUR TIMES A DAY AS NEEDED FOR MUSCLE SPASMS 40 tablet 0    gabapentin (NEURONTIN) 600 MG tablet TAKE ONE TABLET BY MOUTH THREE TIMES A  tablet 1    levothyroxine (SYNTHROID) 100 MCG tablet TAKE ONE TABLET BY MOUTH DAILY 90 tablet 3    traZODone (DESYREL) 50 MG tablet TAKE ONE TABLET BY MOUTH ONCE NIGHTLY 30 tablet 5    metoprolol succinate (TOPROL XL) 25 MG extended release tablet Take 25 mg by mouth daily      spironolactone (ALDACTONE) 25 MG tablet Take 25 mg by mouth daily      bumetanide (BUMEX) 0.5 MG tablet Take 0.5 mg by mouth daily      sacubitril-valsartan (ENTRESTO) 24-26 MG per tablet Take 1 tablet by mouth 2 times daily      simvastatin (ZOCOR) 40 MG tablet TAKE ONE TABLET BY MOUTH DAILY 90 tablet 3    nitroGLYCERIN (NITROSTAT) 0.4 MG SL tablet up to max of 3 total doses. If no relief after 1 dose, call 911. 25 tablet 3    diclofenac sodium (VOLTAREN) 1 % GEL Apply 4 g topically every 4 hours as needed for Pain 1 Tube 5    atenolol (TENORMIN) 25 MG tablet TAKE ONE TABLET BY MOUTH DAILY 90 tablet 2    triamcinolone (KENALOG) 0.025 % cream Apply 1 applicator topically 2 times daily Indications: on face Apply Topically      aspirin 81 MG tablet Take 325 mg by mouth 2 times daily       Multiple Vitamins-Minerals (CENTRUM SILVER PO) Take 1 tablet by mouth daily       No current facility-administered medications for this visit. ALLERGIES    No Known Allergies    PHYSICAL EXAM   Physical Exam  Vitals reviewed. Constitutional:       Appearance: She is well-developed. HENT:      Head: Normocephalic. Eyes:      Conjunctiva/sclera: Conjunctivae normal.      Pupils: Pupils are equal, round, and reactive to light. Neck:      Thyroid: No thyromegaly. Cardiovascular:      Rate and Rhythm: Normal rate and regular rhythm. Heart sounds: Normal heart sounds. No murmur heard. Pulmonary:      Effort: Pulmonary effort is normal.      Breath sounds: Normal breath sounds. No wheezing or rales. Abdominal:      Palpations: Abdomen is soft. Tenderness: There is no abdominal tenderness. There is no guarding or rebound. Musculoskeletal:         General: Deformity (rt foot drop) present. No tenderness. Normal range of motion. Cervical back: Normal range of motion and neck supple. Lymphadenopathy:      Cervical: No cervical adenopathy. Skin:     General: Skin is warm and dry.       Comments: See photo   Neurological:      Mental Status: She is alert and oriented to person, place, and time. Psychiatric:         Mood and Affect: Mood normal.         Behavior: Behavior normal.         Thought Content: Thought content normal.         Judgment: Judgment normal.         Rt lower leg    ASSESSMENT/PLAN  1. Essential hypertension  Chronic and stable. Continue current meds  - Comprehensive Metabolic Panel; Future    2. NSVT (nonsustained ventricular tachycardia) (HCC)  No current symptoms. Has 2 beta-blockers listed on her med list and she believes she is taking both of them. Follow with cardiologist as planned    3. Elevated lipase  Recheck  - Lipase; Future    4. Abdominal pain, generalized  Recheck labs and monitor symptoms    5. Flu vaccine need    - INFLUENZA, QUADV, ADJUVANTED, 65 YRS =, IM, PF, PREFILL SYR, 0.5ML (FLUAD)    6. Chronic diastolic congestive heart failure (HCC)  Chronic and stable. Follow with cardiologist  - Comprehensive Metabolic Panel; Future    7. Encounter for hepatitis C screening test for low risk patient    - Hepatitis C Antibody; Future    8. Primary osteoarthritis involving multiple joints  Continue activity within any limitations. 9. Hypothyroidism, unspecified type  Chronic and stable. Continue med    10. Gallstones  No current symptoms. Continue to monitor  - CBC Auto Differential; Future    11. Elevated liver enzymes  Recheck  - Comprehensive Metabolic Panel; Future    12. Insect bite of right lower leg, initial encounter  Continue using Neosporin and keeping area clean       Jewel Robe received counseling on the following healthy behaviors: nutrition, exercise and medication adherence  Reviewed prior labs and health maintenance. Continue current medications, diet and exercise. Discussed use, benefit, and side effects of prescribed medications. Barriers to medication compliance addressed. Patient given educational materials - see patient instructions. All patient questions answered. Patient voiced understanding. Return in about 6 months (around 4/4/2022) for htn.         Electronically signed by Alisa Francis MD on 10/4/21 at 1:58 PM EDT

## 2021-10-05 LAB
ALBUMIN SERPL-MCNC: 4.7 G/DL (ref 3.5–5.2)
ALBUMIN/GLOBULIN RATIO: 1.7 (ref 1–2.5)
ALP BLD-CCNC: 95 U/L (ref 35–104)
ALT SERPL-CCNC: 18 U/L (ref 5–33)
ANION GAP SERPL CALCULATED.3IONS-SCNC: 11 MMOL/L (ref 9–17)
AST SERPL-CCNC: 21 U/L
BILIRUB SERPL-MCNC: 0.78 MG/DL (ref 0.3–1.2)
BUN BLDV-MCNC: 16 MG/DL (ref 8–23)
BUN/CREAT BLD: ABNORMAL (ref 9–20)
CALCIUM SERPL-MCNC: 9.4 MG/DL (ref 8.6–10.4)
CHLORIDE BLD-SCNC: 100 MMOL/L (ref 98–107)
CO2: 27 MMOL/L (ref 20–31)
CREAT SERPL-MCNC: 0.98 MG/DL (ref 0.5–0.9)
GFR AFRICAN AMERICAN: >60 ML/MIN
GFR NON-AFRICAN AMERICAN: 55 ML/MIN
GFR SERPL CREATININE-BSD FRML MDRD: ABNORMAL ML/MIN/{1.73_M2}
GFR SERPL CREATININE-BSD FRML MDRD: ABNORMAL ML/MIN/{1.73_M2}
GLUCOSE BLD-MCNC: 93 MG/DL (ref 70–99)
HEPATITIS C ANTIBODY: NONREACTIVE
LIPASE: 34 U/L (ref 13–60)
POTASSIUM SERPL-SCNC: 4.8 MMOL/L (ref 3.7–5.3)
SODIUM BLD-SCNC: 138 MMOL/L (ref 135–144)
TOTAL PROTEIN: 7.4 G/DL (ref 6.4–8.3)

## 2021-10-18 DIAGNOSIS — M51.36 DDD (DEGENERATIVE DISC DISEASE), LUMBAR: ICD-10-CM

## 2021-10-18 DIAGNOSIS — M15.9 PRIMARY OSTEOARTHRITIS INVOLVING MULTIPLE JOINTS: ICD-10-CM

## 2021-10-18 RX ORDER — HYDROCODONE BITARTRATE AND ACETAMINOPHEN 5; 325 MG/1; MG/1
1 TABLET ORAL EVERY 8 HOURS PRN
Qty: 30 TABLET | Refills: 0 | Status: SHIPPED | OUTPATIENT
Start: 2021-10-18 | End: 2021-11-04 | Stop reason: SDUPTHER

## 2021-10-18 NOTE — TELEPHONE ENCOUNTER
Pharm requested    Health Maintenance   Topic Date Due    Annual Wellness Visit (AWV)  Never done    DTaP/Tdap/Td vaccine (2 - Td or Tdap) 04/04/2021    Shingles Vaccine (1 of 2) 10/10/2024 (Originally 3/21/1993)    Lipid screen  01/03/2022    TSH testing  01/03/2022    Potassium monitoring  10/04/2022    Creatinine monitoring  10/04/2022    DEXA (modify frequency per FRAX score)  Completed    Flu vaccine  Completed    Pneumococcal 65+ years Vaccine  Completed    COVID-19 Vaccine  Completed    Hepatitis C screen  Completed    Hepatitis A vaccine  Aged Out    Hepatitis B vaccine  Aged Out    Hib vaccine  Aged Out    Meningococcal (ACWY) vaccine  Aged Out             (applicable per patient's age: Cancer Screenings, Depression Screening, Fall Risk Screening, Immunizations)    LDL Cholesterol (mg/dL)   Date Value   01/03/2021 65     AST (U/L)   Date Value   10/04/2021 21     ALT (U/L)   Date Value   10/04/2021 18     BUN (mg/dL)   Date Value   10/04/2021 16      (goal A1C is < 7)   (goal LDL is <100) need 30-50% reduction from baseline     BP Readings from Last 3 Encounters:   10/04/21 130/60   05/25/21 (!) 118/51   04/02/21 128/60    (goal /80)      All Future Testing planned in [facility]:  Lab Frequency Next Occurrence       Next Visit Date:  Future Appointments   Date Time Provider Jovany Chandler   4/5/2022  1:00 PM [facility]            Patient Active Problem List:     Essential hypertension     Fatigue     Acquired hypothyroidism     Right foot drop     NSVT (nonsustained ventricular tachycardia) (HCC)     Sigmoid diverticulosis     Primary osteoarthritis involving multiple joints     Mixed hyperlipidemia     Coronary artery disease involving native coronary artery of native heart with angina pectoris (HCC)     Esophageal reflux     Elevated liver enzymes     Chest pain     Troponin level elevated     Transaminasemia     Anemia, normocytic normochromic

## 2021-10-26 ENCOUNTER — TELEPHONE (OUTPATIENT)
Dept: FAMILY MEDICINE CLINIC | Age: 76
End: 2021-10-26

## 2021-10-26 DIAGNOSIS — E78.2 MIXED HYPERLIPIDEMIA: Primary | ICD-10-CM

## 2021-10-26 RX ORDER — ATORVASTATIN CALCIUM 20 MG/1
20 TABLET, FILM COATED ORAL DAILY
Qty: 90 TABLET | Refills: 1 | Status: SHIPPED | OUTPATIENT
Start: 2021-10-26 | End: 2022-04-05 | Stop reason: SDUPTHER

## 2021-10-26 NOTE — TELEPHONE ENCOUNTER
[employer] insurance stated that they are going to limit the amount of simvastatin  that she will be able to take they wanted her to give you a call to see if you can give her another medication to replace this medication or change her quantity of the medication she is getting.

## 2021-11-03 DIAGNOSIS — M15.9 PRIMARY OSTEOARTHRITIS INVOLVING MULTIPLE JOINTS: ICD-10-CM

## 2021-11-03 DIAGNOSIS — M51.36 DDD (DEGENERATIVE DISC DISEASE), LUMBAR: ICD-10-CM

## 2021-11-04 RX ORDER — HYDROCODONE BITARTRATE AND ACETAMINOPHEN 5; 325 MG/1; MG/1
1 TABLET ORAL EVERY 8 HOURS PRN
Qty: 30 TABLET | Refills: 0 | Status: SHIPPED | OUTPATIENT
Start: 2021-11-04 | End: 2021-11-29 | Stop reason: SDUPTHER

## 2021-11-22 ENCOUNTER — TELEPHONE (OUTPATIENT)
Dept: FAMILY MEDICINE CLINIC | Age: 76
End: 2021-11-22

## 2021-11-22 NOTE — TELEPHONE ENCOUNTER
----- Message from Maxim Jacobo sent at 11/22/2021  4:48 PM EST -----  Subject: Medication Problem    QUESTIONS  Name of Medication? omeprazole (PRILOSEC) 40 MG delayed release capsule  Patient-reported dosage and instructions? 1 tablet daily  What question or problem do you have with the medication? patient picked   up this medicine 11/19 and she did not request of this medication to be   refilled. She wants to know if she is supposed to be taking this  Preferred Pharmacy? [facility] [id number] [place] [address], [address] [facility] [phone]  Pharmacy phone number (if available)? [phone]  Additional Information for Provider?   ---------------------------------------------------------------------------  --------------  [address]  What is the best way for the office to contact you? OK to leave message on   voicemail  Preferred Call Back Phone Number? [phone]  ---------------------------------------------------------------------------  --------------  SCRIPT ANSWERS  Relationship to Patient?  Self

## 2021-11-29 DIAGNOSIS — M15.9 PRIMARY OSTEOARTHRITIS INVOLVING MULTIPLE JOINTS: ICD-10-CM

## 2021-11-29 DIAGNOSIS — M51.36 DDD (DEGENERATIVE DISC DISEASE), LUMBAR: ICD-10-CM

## 2021-11-29 RX ORDER — HYDROCODONE BITARTRATE AND ACETAMINOPHEN 5; 325 MG/1; MG/1
1 TABLET ORAL EVERY 8 HOURS PRN
Qty: 30 TABLET | Refills: 0 | Status: SHIPPED | OUTPATIENT
Start: 2021-11-29 | End: 2021-12-20 | Stop reason: SDUPTHER

## 2021-12-03 RX ORDER — BUMETANIDE 0.5 MG/1
0.5 TABLET ORAL DAILY
Qty: 30 TABLET | Refills: 2 | Status: SHIPPED | OUTPATIENT
Start: 2021-12-03

## 2021-12-09 ENCOUNTER — TELEPHONE (OUTPATIENT)
Dept: FAMILY MEDICINE CLINIC | Age: 76
End: 2021-12-09

## 2021-12-09 PROBLEM — M47.816 LUMBAR FACET JOINT SYNDROME: Status: ACTIVE | Noted: 2017-02-22

## 2021-12-09 PROBLEM — I21.4 NSTEMI (NON-ST ELEVATED MYOCARDIAL INFARCTION) (HCC): Status: ACTIVE | Noted: 2017-10-10

## 2021-12-09 PROBLEM — R26.9 GAIT DISTURBANCE: Status: ACTIVE | Noted: 2017-02-22

## 2021-12-09 PROBLEM — H91.93 BILATERAL HEARING LOSS: Status: ACTIVE | Noted: 2017-02-22

## 2021-12-09 PROBLEM — G25.2 ACTION TREMOR: Status: ACTIVE | Noted: 2017-02-22

## 2021-12-10 NOTE — TELEPHONE ENCOUNTER
I'm not clear, does she need an rx for bumex or something else?
Is she going to be out of medication?
Pt called regarding: bumetanide (BUMEX) 0.5 MG tablet    She received a letter from her insurance to, change the medication to another type within the formulary. No options given.       Pharmacy    [address], [address] [facility] [address] [address]
Ravi for the patient to call the office to get clarification.  Trying to find out if she picked up her Bumex in early Dec.
rupinderovguillermo for Sherrie Hinders to call the office.  Ill she be out of medications or did her insurance approve a 30 day supply      Using GoodRx 90 day supply is $24.00
MVC

## 2021-12-20 ENCOUNTER — APPOINTMENT (OUTPATIENT)
Dept: GENERAL RADIOLOGY | Age: 76
DRG: 206 | End: 2021-12-20
Payer: COMMERCIAL

## 2021-12-20 ENCOUNTER — HOSPITAL ENCOUNTER (INPATIENT)
Age: 76
LOS: 1 days | Discharge: LEFT BEFORE DISCHARGE | DRG: 206 | End: 2021-12-21
Attending: EMERGENCY MEDICINE | Admitting: INTERNAL MEDICINE
Payer: COMMERCIAL

## 2021-12-20 DIAGNOSIS — R07.89 ATYPICAL CHEST PAIN: ICD-10-CM

## 2021-12-20 DIAGNOSIS — R09.02 HYPOXIA: Primary | ICD-10-CM

## 2021-12-20 DIAGNOSIS — M15.9 PRIMARY OSTEOARTHRITIS INVOLVING MULTIPLE JOINTS: ICD-10-CM

## 2021-12-20 DIAGNOSIS — M51.36 DDD (DEGENERATIVE DISC DISEASE), LUMBAR: ICD-10-CM

## 2021-12-20 LAB
ABSOLUTE EOS #: <0.03 K/UL (ref 0–0.44)
ABSOLUTE IMMATURE GRANULOCYTE: 0.03 K/UL (ref 0–0.3)
ABSOLUTE LYMPH #: 1.12 K/UL (ref 1.1–3.7)
ABSOLUTE MONO #: 0.26 K/UL (ref 0.1–1.2)
ALBUMIN SERPL-MCNC: 4.5 G/DL (ref 3.5–5.2)
ALBUMIN/GLOBULIN RATIO: ABNORMAL (ref 1–2.5)
ALP BLD-CCNC: 103 U/L (ref 35–104)
ALT SERPL-CCNC: 48 U/L (ref 5–33)
ANION GAP SERPL CALCULATED.3IONS-SCNC: 17 MMOL/L (ref 9–17)
AST SERPL-CCNC: 86 U/L
BASOPHILS # BLD: 0 % (ref 0–2)
BASOPHILS ABSOLUTE: 0.04 K/UL (ref 0–0.2)
BILIRUB SERPL-MCNC: 2.16 MG/DL (ref 0.3–1.2)
BUN BLDV-MCNC: 18 MG/DL (ref 8–23)
BUN/CREAT BLD: 19 (ref 9–20)
CALCIUM SERPL-MCNC: 9.7 MG/DL (ref 8.6–10.4)
CHLORIDE BLD-SCNC: 96 MMOL/L (ref 98–107)
CO2: 23 MMOL/L (ref 20–31)
CREAT SERPL-MCNC: 0.97 MG/DL (ref 0.5–0.9)
DIFFERENTIAL TYPE: ABNORMAL
EOSINOPHILS RELATIVE PERCENT: 0 % (ref 1–4)
GFR AFRICAN AMERICAN: >60 ML/MIN
GFR NON-AFRICAN AMERICAN: 56 ML/MIN
GFR SERPL CREATININE-BSD FRML MDRD: ABNORMAL ML/MIN/{1.73_M2}
GFR SERPL CREATININE-BSD FRML MDRD: ABNORMAL ML/MIN/{1.73_M2}
GLUCOSE BLD-MCNC: 116 MG/DL (ref 70–99)
HCT VFR BLD CALC: 33.2 % (ref 36.3–47.1)
HEMOGLOBIN: 11 G/DL (ref 11.9–15.1)
IMMATURE GRANULOCYTES: 0 %
LIPASE: 78 U/L (ref 13–60)
LYMPHOCYTES # BLD: 11 % (ref 24–43)
MAGNESIUM: 1.3 MG/DL (ref 1.6–2.6)
MCH RBC QN AUTO: 32.6 PG (ref 25.2–33.5)
MCHC RBC AUTO-ENTMCNC: 33.1 G/DL (ref 28.4–34.8)
MCV RBC AUTO: 98.5 FL (ref 82.6–102.9)
MONOCYTES # BLD: 3 % (ref 3–12)
NRBC AUTOMATED: 0 PER 100 WBC
PDW BLD-RTO: 13.3 % (ref 11.8–14.4)
PLATELET # BLD: 264 K/UL (ref 138–453)
PLATELET ESTIMATE: ABNORMAL
PMV BLD AUTO: 9.6 FL (ref 8.1–13.5)
POTASSIUM SERPL-SCNC: 3.2 MMOL/L (ref 3.7–5.3)
RBC # BLD: 3.37 M/UL (ref 3.95–5.11)
RBC # BLD: ABNORMAL 10*6/UL
SEG NEUTROPHILS: 86 % (ref 36–65)
SEGMENTED NEUTROPHILS ABSOLUTE COUNT: 8.6 K/UL (ref 1.5–8.1)
SODIUM BLD-SCNC: 136 MMOL/L (ref 135–144)
TOTAL PROTEIN: 7.4 G/DL (ref 6.4–8.3)
TROPONIN INTERP: ABNORMAL
TROPONIN T: ABNORMAL NG/ML
TROPONIN, HIGH SENSITIVITY: 27 NG/L (ref 0–14)
WBC # BLD: 10.1 K/UL (ref 3.5–11.3)
WBC # BLD: ABNORMAL 10*3/UL

## 2021-12-20 PROCEDURE — 84484 ASSAY OF TROPONIN QUANT: CPT

## 2021-12-20 PROCEDURE — 99283 EMERGENCY DEPT VISIT LOW MDM: CPT

## 2021-12-20 PROCEDURE — 87635 SARS-COV-2 COVID-19 AMP PRB: CPT

## 2021-12-20 PROCEDURE — 96375 TX/PRO/DX INJ NEW DRUG ADDON: CPT

## 2021-12-20 PROCEDURE — 80053 COMPREHEN METABOLIC PANEL: CPT

## 2021-12-20 PROCEDURE — 2580000003 HC RX 258: Performed by: EMERGENCY MEDICINE

## 2021-12-20 PROCEDURE — 85025 COMPLETE CBC W/AUTO DIFF WBC: CPT

## 2021-12-20 PROCEDURE — 93005 ELECTROCARDIOGRAM TRACING: CPT | Performed by: EMERGENCY MEDICINE

## 2021-12-20 PROCEDURE — 71045 X-RAY EXAM CHEST 1 VIEW: CPT

## 2021-12-20 PROCEDURE — 83735 ASSAY OF MAGNESIUM: CPT

## 2021-12-20 PROCEDURE — 96374 THER/PROPH/DIAG INJ IV PUSH: CPT

## 2021-12-20 PROCEDURE — 83690 ASSAY OF LIPASE: CPT

## 2021-12-20 PROCEDURE — 6370000000 HC RX 637 (ALT 250 FOR IP): Performed by: EMERGENCY MEDICINE

## 2021-12-20 PROCEDURE — 6360000002 HC RX W HCPCS: Performed by: EMERGENCY MEDICINE

## 2021-12-20 RX ORDER — HYDROCODONE BITARTRATE AND ACETAMINOPHEN 5; 325 MG/1; MG/1
1 TABLET ORAL EVERY 8 HOURS PRN
Qty: 30 TABLET | Refills: 0 | Status: SHIPPED | OUTPATIENT
Start: 2021-12-20 | End: 2021-12-30

## 2021-12-20 RX ORDER — ONDANSETRON 2 MG/ML
4 INJECTION INTRAMUSCULAR; INTRAVENOUS ONCE
Status: COMPLETED | OUTPATIENT
Start: 2021-12-20 | End: 2021-12-20

## 2021-12-20 RX ORDER — NITROGLYCERIN 0.4 MG/1
0.4 TABLET SUBLINGUAL ONCE
Status: COMPLETED | OUTPATIENT
Start: 2021-12-20 | End: 2021-12-20

## 2021-12-20 RX ORDER — 0.9 % SODIUM CHLORIDE 0.9 %
1000 INTRAVENOUS SOLUTION INTRAVENOUS ONCE
Status: COMPLETED | OUTPATIENT
Start: 2021-12-20 | End: 2021-12-21

## 2021-12-20 RX ORDER — MORPHINE SULFATE 2 MG/ML
2 INJECTION, SOLUTION INTRAMUSCULAR; INTRAVENOUS ONCE
Status: COMPLETED | OUTPATIENT
Start: 2021-12-20 | End: 2021-12-20

## 2021-12-20 RX ORDER — ASPIRIN 81 MG/1
324 TABLET, CHEWABLE ORAL ONCE
Status: COMPLETED | OUTPATIENT
Start: 2021-12-20 | End: 2021-12-20

## 2021-12-20 RX ADMIN — ASPIRIN 81 MG CHEWABLE TABLET 324 MG: 81 TABLET CHEWABLE at 22:12

## 2021-12-20 RX ADMIN — ONDANSETRON 4 MG: 2 INJECTION INTRAMUSCULAR; INTRAVENOUS at 22:39

## 2021-12-20 RX ADMIN — Medication 0.4 MG: at 22:13

## 2021-12-20 RX ADMIN — MORPHINE SULFATE 2 MG: 2 INJECTION, SOLUTION INTRAMUSCULAR; INTRAVENOUS at 23:55

## 2021-12-20 RX ADMIN — SODIUM CHLORIDE 1000 ML: 9 INJECTION, SOLUTION INTRAVENOUS at 23:55

## 2021-12-20 RX ADMIN — Medication: at 23:56

## 2021-12-20 ASSESSMENT — ENCOUNTER SYMPTOMS
EYES NEGATIVE: 1
CHEST TIGHTNESS: 0
SHORTNESS OF BREATH: 0
DIARRHEA: 0
COLOR CHANGE: 0
ABDOMINAL DISTENTION: 0
VOMITING: 0
CONSTIPATION: 0
APNEA: 0
SINUS PAIN: 0

## 2021-12-20 ASSESSMENT — PAIN DESCRIPTION - LOCATION: LOCATION: CHEST

## 2021-12-20 ASSESSMENT — PAIN SCALES - GENERAL
PAINLEVEL_OUTOF10: 10
PAINLEVEL_OUTOF10: 10

## 2021-12-20 ASSESSMENT — PAIN DESCRIPTION - PAIN TYPE: TYPE: ACUTE PAIN

## 2021-12-20 ASSESSMENT — PAIN DESCRIPTION - DESCRIPTORS: DESCRIPTORS: PRESSURE;CONSTANT

## 2021-12-20 NOTE — TELEPHONE ENCOUNTER
Pharm requested    Health Maintenance   Topic Date Due    Annual Wellness Visit (AWV)  Never done    DTaP/Tdap/Td vaccine (2 - Td or Tdap) 04/04/2021    COVID-19 Vaccine (3 - Booster for Moderna series) 09/03/2021    Lipid screen  01/03/2022    TSH testing  01/03/2022    Shingles Vaccine (1 of 2) 10/10/2024 (Originally 3/21/1993)    Potassium monitoring  10/04/2022    Creatinine monitoring  10/04/2022    DEXA (modify frequency per FRAX score)  Completed    Flu vaccine  Completed    Pneumococcal 65+ years Vaccine  Completed    Hepatitis C screen  Completed    Hepatitis A vaccine  Aged Out    Hepatitis B vaccine  Aged Out    Hib vaccine  Aged Out    Meningococcal (ACWY) vaccine  Aged Out             (applicable per patient's age: Cancer Screenings, Depression Screening, Fall Risk Screening, Immunizations)    LDL Cholesterol (mg/dL)   Date Value   01/03/2021 65     AST (U/L)   Date Value   10/04/2021 21     ALT (U/L)   Date Value   10/04/2021 18     BUN (mg/dL)   Date Value   10/04/2021 16      (goal A1C is < 7)   (goal LDL is <100) need 30-50% reduction from baseline     BP Readings from Last 3 Encounters:   10/04/21 130/60   05/25/21 (!) 118/51   04/02/21 128/60    (goal /80)      All Future Testing planned in [facility]:  Lab Frequency Next Occurrence       Next Visit Date:  Future Appointments   Date Time Provider Jovany Chandler   4/5/2022  1:00 PM MD Aly Musa [facility]            Patient Active Problem List:     Essential hypertension     Fatigue     Acquired hypothyroidism     Right foot drop     NSVT (nonsustained ventricular tachycardia) (HCC)     Sigmoid diverticulosis     Primary osteoarthritis involving multiple joints     Mixed hyperlipidemia     Coronary artery disease involving native coronary artery of native heart with angina pectoris (HCC)     Esophageal reflux     Elevated liver enzymes     Chest pain     Troponin level elevated     Transaminasemia     Anemia, normocytic normochromic     Action tremor     Bilateral hearing loss     Gait disturbance     Lumbar facet joint syndrome     NSTEMI (non-ST elevated myocardial infarction) ([facility] [address]

## 2021-12-21 ENCOUNTER — APPOINTMENT (OUTPATIENT)
Dept: CT IMAGING | Age: 76
DRG: 206 | End: 2021-12-21
Payer: COMMERCIAL

## 2021-12-21 VITALS
SYSTOLIC BLOOD PRESSURE: 112 MMHG | WEIGHT: 135 LBS | HEIGHT: 65 IN | TEMPERATURE: 97.5 F | DIASTOLIC BLOOD PRESSURE: 47 MMHG | HEART RATE: 68 BPM | BODY MASS INDEX: 22.49 KG/M2 | RESPIRATION RATE: 18 BRPM | OXYGEN SATURATION: 98 %

## 2021-12-21 PROBLEM — R09.02 HYPOXIA: Status: ACTIVE | Noted: 2021-12-21

## 2021-12-21 LAB
ADENOVIRUS PCR: NOT DETECTED
ANION GAP SERPL CALCULATED.3IONS-SCNC: 16 MMOL/L (ref 9–17)
BORDETELLA PARAPERTUSSIS: NOT DETECTED
BORDETELLA PERTUSSIS PCR: NOT DETECTED
BUN BLDV-MCNC: 16 MG/DL (ref 8–23)
BUN/CREAT BLD: 17 (ref 9–20)
C-REACTIVE PROTEIN: 20.1 MG/L (ref 0–5)
CALCIUM SERPL-MCNC: 8.8 MG/DL (ref 8.6–10.4)
CHLAMYDIA PNEUMONIAE BY PCR: NOT DETECTED
CHLORIDE BLD-SCNC: 101 MMOL/L (ref 98–107)
CO2: 23 MMOL/L (ref 20–31)
CORONAVIRUS 229E PCR: NOT DETECTED
CORONAVIRUS HKU1 PCR: NOT DETECTED
CORONAVIRUS NL63 PCR: NOT DETECTED
CORONAVIRUS OC43 PCR: NOT DETECTED
CREAT SERPL-MCNC: 0.93 MG/DL (ref 0.5–0.9)
D-DIMER QUANTITATIVE: 0.73 MG/L FEU (ref 0–0.59)
D-DIMER QUANTITATIVE: 0.91 MG/L FEU (ref 0–0.59)
FERRITIN: 509 UG/L (ref 13–150)
GFR AFRICAN AMERICAN: >60 ML/MIN
GFR NON-AFRICAN AMERICAN: 58 ML/MIN
GFR SERPL CREATININE-BSD FRML MDRD: ABNORMAL ML/MIN/{1.73_M2}
GFR SERPL CREATININE-BSD FRML MDRD: ABNORMAL ML/MIN/{1.73_M2}
GLUCOSE BLD-MCNC: 117 MG/DL (ref 70–99)
HCT VFR BLD CALC: 34.9 % (ref 36.3–47.1)
HEMOGLOBIN: 11.4 G/DL (ref 11.9–15.1)
HUMAN METAPNEUMOVIRUS PCR: NOT DETECTED
INFLUENZA A BY PCR: NOT DETECTED
INFLUENZA A H1 (2009) PCR: NORMAL
INFLUENZA A H1 PCR: NORMAL
INFLUENZA A H3 PCR: NORMAL
INFLUENZA B BY PCR: NOT DETECTED
LACTATE DEHYDROGENASE: 322 U/L (ref 135–214)
MCH RBC QN AUTO: 32.5 PG (ref 25.2–33.5)
MCHC RBC AUTO-ENTMCNC: 32.7 G/DL (ref 28.4–34.8)
MCV RBC AUTO: 99.4 FL (ref 82.6–102.9)
MYCOPLASMA PNEUMONIAE PCR: NOT DETECTED
MYOGLOBIN: 139 NG/ML (ref 25–58)
NRBC AUTOMATED: 0 PER 100 WBC
PARAINFLUENZA 1 PCR: NOT DETECTED
PARAINFLUENZA 2 PCR: NOT DETECTED
PARAINFLUENZA 3 PCR: NOT DETECTED
PARAINFLUENZA 4 PCR: NOT DETECTED
PDW BLD-RTO: 13.8 % (ref 11.8–14.4)
PLATELET # BLD: 222 K/UL (ref 138–453)
PMV BLD AUTO: 9.5 FL (ref 8.1–13.5)
POTASSIUM SERPL-SCNC: 3.7 MMOL/L (ref 3.7–5.3)
PROCALCITONIN: 0.07 NG/ML
PROCALCITONIN: 0.14 NG/ML
RBC # BLD: 3.51 M/UL (ref 3.95–5.11)
RESP SYNCYTIAL VIRUS PCR: NOT DETECTED
RHINO/ENTEROVIRUS PCR: NOT DETECTED
SARS-COV-2, PCR: NOT DETECTED
SARS-COV-2, RAPID: NOT DETECTED
SODIUM BLD-SCNC: 140 MMOL/L (ref 135–144)
SPECIMEN DESCRIPTION: NORMAL
SPECIMEN DESCRIPTION: NORMAL
THYROXINE, FREE: 0.5 NG/DL (ref 0.93–1.7)
TROPONIN INTERP: ABNORMAL
TROPONIN INTERP: ABNORMAL
TROPONIN T: ABNORMAL NG/ML
TROPONIN T: ABNORMAL NG/ML
TROPONIN, HIGH SENSITIVITY: 26 NG/L (ref 0–14)
TROPONIN, HIGH SENSITIVITY: 66 NG/L (ref 0–14)
TSH SERPL DL<=0.05 MIU/L-ACNC: 12.19 MIU/L (ref 0.3–5)
WBC # BLD: 14.1 K/UL (ref 3.5–11.3)

## 2021-12-21 PROCEDURE — 36415 COLL VENOUS BLD VENIPUNCTURE: CPT

## 2021-12-21 PROCEDURE — 84439 ASSAY OF FREE THYROXINE: CPT

## 2021-12-21 PROCEDURE — 86140 C-REACTIVE PROTEIN: CPT

## 2021-12-21 PROCEDURE — 71260 CT THORAX DX C+: CPT

## 2021-12-21 PROCEDURE — 84443 ASSAY THYROID STIM HORMONE: CPT

## 2021-12-21 PROCEDURE — 2580000003 HC RX 258: Performed by: NURSE PRACTITIONER

## 2021-12-21 PROCEDURE — 2580000003 HC RX 258: Performed by: EMERGENCY MEDICINE

## 2021-12-21 PROCEDURE — 0202U NFCT DS 22 TRGT SARS-COV-2: CPT

## 2021-12-21 PROCEDURE — 80048 BASIC METABOLIC PNL TOTAL CA: CPT

## 2021-12-21 PROCEDURE — 6360000004 HC RX CONTRAST MEDICATION: Performed by: EMERGENCY MEDICINE

## 2021-12-21 PROCEDURE — 1200000000 HC SEMI PRIVATE

## 2021-12-21 PROCEDURE — 82728 ASSAY OF FERRITIN: CPT

## 2021-12-21 PROCEDURE — 6360000002 HC RX W HCPCS: Performed by: NURSE PRACTITIONER

## 2021-12-21 PROCEDURE — 97530 THERAPEUTIC ACTIVITIES: CPT

## 2021-12-21 PROCEDURE — 97163 PT EVAL HIGH COMPLEX 45 MIN: CPT

## 2021-12-21 PROCEDURE — 97535 SELF CARE MNGMENT TRAINING: CPT

## 2021-12-21 PROCEDURE — 97116 GAIT TRAINING THERAPY: CPT

## 2021-12-21 PROCEDURE — 84145 PROCALCITONIN (PCT): CPT

## 2021-12-21 PROCEDURE — 84484 ASSAY OF TROPONIN QUANT: CPT

## 2021-12-21 PROCEDURE — 85379 FIBRIN DEGRADATION QUANT: CPT

## 2021-12-21 PROCEDURE — APPSS45 APP SPLIT SHARED TIME 31-45 MINUTES: Performed by: NURSE PRACTITIONER

## 2021-12-21 PROCEDURE — 85027 COMPLETE CBC AUTOMATED: CPT

## 2021-12-21 PROCEDURE — 94761 N-INVAS EAR/PLS OXIMETRY MLT: CPT

## 2021-12-21 PROCEDURE — 2700000000 HC OXYGEN THERAPY PER DAY

## 2021-12-21 PROCEDURE — 6370000000 HC RX 637 (ALT 250 FOR IP): Performed by: NURSE PRACTITIONER

## 2021-12-21 PROCEDURE — 83615 LACTATE (LD) (LDH) ENZYME: CPT

## 2021-12-21 PROCEDURE — 97166 OT EVAL MOD COMPLEX 45 MIN: CPT

## 2021-12-21 PROCEDURE — 83874 ASSAY OF MYOGLOBIN: CPT

## 2021-12-21 RX ORDER — ACETAMINOPHEN 650 MG/1
650 SUPPOSITORY RECTAL EVERY 6 HOURS PRN
Status: DISCONTINUED | OUTPATIENT
Start: 2021-12-21 | End: 2021-12-21 | Stop reason: HOSPADM

## 2021-12-21 RX ORDER — SODIUM CHLORIDE 0.9 % (FLUSH) 0.9 %
5-40 SYRINGE (ML) INJECTION EVERY 12 HOURS SCHEDULED
Status: DISCONTINUED | OUTPATIENT
Start: 2021-12-21 | End: 2021-12-21 | Stop reason: HOSPADM

## 2021-12-21 RX ORDER — LEVOTHYROXINE SODIUM 0.1 MG/1
100 TABLET ORAL DAILY
Status: DISCONTINUED | OUTPATIENT
Start: 2021-12-21 | End: 2021-12-21 | Stop reason: HOSPADM

## 2021-12-21 RX ORDER — 0.9 % SODIUM CHLORIDE 0.9 %
80 INTRAVENOUS SOLUTION INTRAVENOUS ONCE
Status: COMPLETED | OUTPATIENT
Start: 2021-12-21 | End: 2021-12-21

## 2021-12-21 RX ORDER — POTASSIUM CHLORIDE 7.45 MG/ML
10 INJECTION INTRAVENOUS PRN
Status: DISCONTINUED | OUTPATIENT
Start: 2021-12-21 | End: 2021-12-21 | Stop reason: HOSPADM

## 2021-12-21 RX ORDER — VITAMIN B COMPLEX
2000 TABLET ORAL DAILY
Status: DISCONTINUED | OUTPATIENT
Start: 2021-12-21 | End: 2021-12-21 | Stop reason: HOSPADM

## 2021-12-21 RX ORDER — ATENOLOL 25 MG/1
25 TABLET ORAL DAILY
Status: DISCONTINUED | OUTPATIENT
Start: 2021-12-21 | End: 2021-12-21 | Stop reason: HOSPADM

## 2021-12-21 RX ORDER — MAGNESIUM SULFATE 1 G/100ML
1000 INJECTION INTRAVENOUS PRN
Status: DISCONTINUED | OUTPATIENT
Start: 2021-12-21 | End: 2021-12-21 | Stop reason: HOSPADM

## 2021-12-21 RX ORDER — SODIUM CHLORIDE 0.9 % (FLUSH) 0.9 %
10 SYRINGE (ML) INJECTION PRN
Status: DISCONTINUED | OUTPATIENT
Start: 2021-12-21 | End: 2021-12-21 | Stop reason: HOSPADM

## 2021-12-21 RX ORDER — PANTOPRAZOLE SODIUM 40 MG/1
40 TABLET, DELAYED RELEASE ORAL
Status: DISCONTINUED | OUTPATIENT
Start: 2021-12-21 | End: 2021-12-21 | Stop reason: HOSPADM

## 2021-12-21 RX ORDER — POTASSIUM CHLORIDE 20 MEQ/1
40 TABLET, EXTENDED RELEASE ORAL PRN
Status: DISCONTINUED | OUTPATIENT
Start: 2021-12-21 | End: 2021-12-21 | Stop reason: HOSPADM

## 2021-12-21 RX ORDER — ATORVASTATIN CALCIUM 20 MG/1
20 TABLET, FILM COATED ORAL DAILY
Status: DISCONTINUED | OUTPATIENT
Start: 2021-12-21 | End: 2021-12-21 | Stop reason: HOSPADM

## 2021-12-21 RX ORDER — SODIUM CHLORIDE 9 MG/ML
25 INJECTION, SOLUTION INTRAVENOUS PRN
Status: DISCONTINUED | OUTPATIENT
Start: 2021-12-21 | End: 2021-12-21 | Stop reason: HOSPADM

## 2021-12-21 RX ORDER — BUMETANIDE 1 MG/1
0.5 TABLET ORAL DAILY
Status: DISCONTINUED | OUTPATIENT
Start: 2021-12-21 | End: 2021-12-21 | Stop reason: HOSPADM

## 2021-12-21 RX ORDER — ACETAMINOPHEN 325 MG/1
650 TABLET ORAL EVERY 6 HOURS PRN
Status: DISCONTINUED | OUTPATIENT
Start: 2021-12-21 | End: 2021-12-21 | Stop reason: HOSPADM

## 2021-12-21 RX ADMIN — IOPAMIDOL 75 ML: 755 INJECTION, SOLUTION INTRAVENOUS at 02:35

## 2021-12-21 RX ADMIN — LEVOTHYROXINE SODIUM 100 MCG: 0.1 TABLET ORAL at 05:47

## 2021-12-21 RX ADMIN — SODIUM CHLORIDE, PRESERVATIVE FREE 10 ML: 5 INJECTION INTRAVENOUS at 08:38

## 2021-12-21 RX ADMIN — Medication 2000 UNITS: at 08:35

## 2021-12-21 RX ADMIN — SODIUM CHLORIDE 80 ML: 9 INJECTION, SOLUTION INTRAVENOUS at 02:36

## 2021-12-21 RX ADMIN — ATORVASTATIN CALCIUM 20 MG: 20 TABLET, FILM COATED ORAL at 08:35

## 2021-12-21 RX ADMIN — ENOXAPARIN SODIUM 40 MG: 100 INJECTION SUBCUTANEOUS at 08:36

## 2021-12-21 RX ADMIN — SODIUM CHLORIDE, PRESERVATIVE FREE 10 ML: 5 INJECTION INTRAVENOUS at 02:36

## 2021-12-21 RX ADMIN — PANTOPRAZOLE SODIUM 40 MG: 40 TABLET, DELAYED RELEASE ORAL at 05:47

## 2021-12-21 RX ADMIN — SODIUM CHLORIDE, PRESERVATIVE FREE 10 ML: 5 INJECTION INTRAVENOUS at 04:31

## 2021-12-21 ASSESSMENT — PAIN SCALES - GENERAL
PAINLEVEL_OUTOF10: 0

## 2021-12-21 ASSESSMENT — ENCOUNTER SYMPTOMS
SHORTNESS OF BREATH: 0
SORE THROAT: 0
COUGH: 0
VOMITING: 0
SINUS PAIN: 0
NAUSEA: 1

## 2021-12-21 NOTE — ED PROVIDER NOTES
8.60 (*)     All other components within normal limits   LIPASE - Abnormal; Notable for the following components:    Lipase 78 (*)     All other components within normal limits   TROPONIN - Abnormal; Notable for the following components:    Troponin, High Sensitivity 27 (*)     All other components within normal limits   TROPONIN - Abnormal; Notable for the following components:    Troponin, High Sensitivity 26 (*)     All other components within normal limits   MAGNESIUM - Abnormal; Notable for the following components:    Magnesium 1.3 (*)     All other components within normal limits   D-DIMER, QUANTITATIVE - Abnormal; Notable for the following components:    D-Dimer, Quant 0.73 (*)     All other components within normal limits   FERRITIN - Abnormal; Notable for the following components:    Ferritin 509 (*)     All other components within normal limits   LACTATE DEHYDROGENASE - Abnormal; Notable for the following components:     (*)     All other components within normal limits   COVID-19, RAPID   PROCALCITONIN       EMERGENCY DEPARTMENTCOURSE:         Vitals:    Vitals:    12/21/21 0219 12/21/21 0230 12/21/21 0255 12/21/21 0300   BP: (!) 114/48 (!) 111/45 (!) 111/92 104/63   Pulse: 88 87 94 89   Resp: 19 17 19 17   Temp:       TempSrc:       SpO2: 95% 95% 93% 93%   Weight:       Height:           The patient was given the following medications while in the emergency department:  Orders Placed This Encounter   Medications    aspirin chewable tablet 324 mg    nitroGLYCERIN (NITROSTAT) SL tablet 0.4 mg    ondansetron (ZOFRAN) injection 4 mg    0.9 % sodium chloride bolus    morphine (PF) injection 2 mg    aluminum & magnesium hydroxide-simethicone (MAALOX) 30 mL, lidocaine viscous hcl (XYLOCAINE) 5 mL (GI COCKTAIL)    0.9 % sodium chloride bolus    iopamidol (ISOVUE-370) 76 % injection 75 mL    sodium chloride flush 0.9 % injection 10 mL     CONSULTS:  IP CONSULT TO INTERNAL MEDICINE    FINAL IMPRESSION      1. Hypoxia    2. Atypical chest pain          DISPOSITION/PLAN   DISPOSITION Decision To Admit 12/21/2021 03:13:41 AM      PATIENT REFERRED TO:  No follow-up provider specified.   DISCHARGE MEDICATIONS:  New Prescriptions    No medications on file     Jael Rabago MD  Attending Emergency Physician                    Kathleen Peng MD  12/21/21 [id number]

## 2021-12-21 NOTE — PROGRESS NOTES
Physical Therapy    Facility/Department: [facility] MED SURG  Initial Assessment    NAME: Maia Simons  : 1945  MRN: [id number]    Date of Service: 2021    Discharge Recommendations:  Home with assist PRN,Home with Home health PT        Assessment   Assessment: Decreased activity tolerance  Prognosis: Good  Decision Making: High Complexity  PT Education: PT Role;Plan of Care;General Safety  Patient Education: Energy conservation handout  REQUIRES PT FOLLOW UP: Yes  Activity Tolerance  Activity Tolerance: Patient Tolerated treatment well       Patient Diagnosis(es): The primary encounter diagnosis was Hypoxia. A diagnosis of Atypical chest pain was also pertinent to this visit. has a past medical history of Arthritis, CAD (coronary artery disease), Elevated liver enzymes, Esophageal reflux, Foot drop, right foot, Heart attack ([facility] [address] History of Clostridium difficile infection, HL (hearing loss), Hyperlipidemia, Hypertension, Hypothyroidism, Lumbar radiculopathy, Osteoarthritis, Osteopenia, Psychiatric problem, and Tremor. has a past surgical history that includes Cardiac catheterization (2015, 2017); bladder suspension; Tonsillectomy and adenoidectomy; Dilation and curettage of uterus; Tooth Extraction; Total hip arthroplasty (Left, 2017); joint replacement (Right, ); joint replacement (Left, ); Colonoscopy (); Colonoscopy (2017); Upper gastrointestinal endoscopy (2017); pr egd transoral biopsy single/multiple (N/A, 2017); and pr colonoscopy w/biopsy single/multiple (N/A, 2017).     Restrictions  Restrictions/Precautions  Restrictions/Precautions: Contact Precautions,Isolation,Fall Risk,Up as Tolerated  Required Braces or Orthoses?: No  Position Activity Restriction  Other position/activity restrictions: DROPLET plus isolation, 2 L O2 per NC, LUE IV, alarms  Vision/Hearing        Subjective  General  Chart Reviewed: Yes  Patient assessed for rehabilitation services?: Yes  Response To Previous Treatment: Not applicable  Family / Caregiver Present: No  Follows Commands: Within Functional Limits  General Comment  Comments: OK for PT per [facility] RN  Pain Screening  Patient Currently in Pain: Denies  Vital Signs  Patient Currently in Pain: Denies       Orientation  Orientation  Overall Orientation Status: Within Normal Limits  Social/Functional History  Social/Functional History  Lives With: Spouse (pt states spouse has MS and is mostly in bed)  Type of Home: Apartment  Home Layout: Two level (1st floor)  Home Access: Level entry,Ramped entrance  Bathroom Shower/Tub: Walk-in shower  Bathroom Toilet: Handicap height  Bathroom Equipment: Shower chair,Grab bars in [address] [place]: Rolling walker,4 wheeled [employer] aid  Receives Help From: Neighbor (pt states she has supportive neighbors)  ADL Assistance: Independent (pt states she uses sock aid PTA)  Homemaking Assistance: Independent  Homemaking Responsibilities: Yes  Ambulation Assistance: Independent (with RW)  Transfer Assistance: Independent  Active : Yes  Occupation: Retired  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   Cognition   Cognition  Overall Cognitive Status: WNL    Objective          AROM RLE (degrees)  RLE AROM: WNL  AROM LLE (degrees)  LLE AROM : WNL  AROM RUE (degrees)  RUE General AROM: See OT eval for B UE ROM  Strength RLE  Strength RLE:R ankle d/f 0/5,pf trace,knee/hip 4/5 to 4+/5  Strength LLE  Strength LLE: WFL  Strength RUE  Comment: See OT eval for B UE MMT  Tone RLE  RLE Tone: Normotonic  Tone LLE  LLE Tone: Normotonic  Motor Control  Gross Motor?: WNL     Bed mobility  Rolling to Right: Stand by assistance  Transfers  Sit to Stand: Contact guard assistance  Stand to sit: Contact guard assistance  Stand Pivot Transfers: Contact guard assistance  Ambulation  Ambulation?: Yes  Ambulation 1  Surface: level tile  Device: Rolling Walker  Other Apparatus: O2  Assistance: Contact guard assistance  Gait Deviations: Slow Mariana  Distance: 80' x 1  Comments: 97% SpO2 prior to ambulation, 96% s/p ambulation, no SOB  Stairs/Curb  Stairs?: No     Balance  Posture: Good  Sitting - Static: Good  Sitting - Dynamic: Good  Standing - Static: Good  Standing - Dynamic: Good        Plan   Plan  Times per week: Pt d/c'd  to home after PT eval this AM  Safety Devices  Type of devices: Left in chair,Chair alarm in place,Call light within reach,Gait belt,Nurse notified  Restraints  Initially in place: No    G-Code       OutComes Score  Balance Score: 12 (12/21/21 1319)  Gait Score: 9 (12/21/21 1319)        Tinetti Total Score: 21 (12/21/21 1319)                                   AM-PAC Score  AM-PAC Inpatient Mobility Raw Score : 20 (12/21/21 1319)  AM-PAC Inpatient T-Scale Score : 47.67 (12/21/21 1319)  Mobility Inpatient CMS 0-100% Score: 35.83 (12/21/21 1319)  Mobility Inpatient CMS G-Code Modifier : CJ (12/21/21 1319)          Goals  Short term goals  Time Frame for Short term goals:  Tolerate 30 min ther act  Patient Goals   Patient goals : Home       Therapy Time   Individual Concurrent Group Co-treatment   Time In 6104 (Treatment time 26 minutes)         Time Out 1026         Minutes [facility] Kam Miller

## 2021-12-21 NOTE — DISCHARGE SUMMARY
[facility]  Office: [address], DO, Julián Franky, DO, Hakeem Base, DO, Juarez Zamoramond Blood, DO, Connie Martinez MD, Dony Barba MD, Nestor Duvall MD, Ella Pace MD, Neville Freeman MD, Slick Decker MD, Ramila Fiore MD, Viktoriya Cagle, DO, Jennifer Bay, DO, Amelia Barrera MD,  Simeon Mills, DO, Zohra Ervin MD, Fermin Rousseau MD, Pamela Harrsi MD, Abimael Patel MD, Rolando Porter MD, Stanton Gutierrez MD, Ghazal Butcher MD, Roxanne Ocampo, [facility], [facility], [facility], Kraig Canas, CNP, Marino Coley, [facility], Mio Bustos, CNP, Antionette Fowler, CNP, Baron Lesch, CNP, Brennen Alvarez, [facility], Kourtney Lynn, CNP, Ruby Riddle PA-C, Denver Parks, [facility], Saurabh Bruner, [facility], Lydia Wallace, CNP, Gabo Mcgill, CNP, Stanton Rosario, CNP, Wilver Ramirez, CNP, Paula Allen, CNP, Eric Merritt, [facility]    Discharge Summary     Patient ID: Stefania Elizondo  :  1945   MRN: [id number]     ACCOUNT:  [de-identified]   Patient's PCP: Martha Riggs MD  Admit Date: 2021   Discharge Date: 2021 Length of Stay: 1  Code Status:  Prior  Admitting Physician: Neville Freeman MD  Discharge Physician: Neville Freeman MD     Active Discharge Diagnoses:     Hospital Problem Lists:  Principal Problem:    NSTEMI (non-ST elevated myocardial infarction) [facility])  Active Problems:    Essential hypertension    Acquired hypothyroidism    Mixed hyperlipidemia    Coronary artery disease involving native coronary artery of native heart with angina pectoris ([facility] [address]    Hypoxia  Resolved Problems:    * No resolved hospital problems. *      Admission Condition:  fair     Discharged Condition: unknown    Hospital Stay:   65 yo admitted with epigastric pain with mild nausea. Troponin 27-26-66. While being worked up for NSTEMI, patient left AMA. Radiology:  XR CHEST PORTABLE    Result Date: 2021  Mild pulmonary vascular congestive change. Cardiomegaly. No significant pleural effusion. CT CHEST PULMONARY EMBOLISM W CONTRAST    Result Date: 12/21/2021  No evidence of pulmonary embolism or acute pulmonary abnormality. RECOMMENDATIONS: Unavailable       Consultations:    Consults:     Final Specialist Recommendations/Findings:   IP CONSULT TO INTERNAL MEDICINE  IP CONSULT TO CARDIOLOGY      The patient was seen and examined on day of discharge and this discharge summary is in conjunction with any daily progress note from day of discharge. Discharge plan:     Jey Busch    Electronically signed by   Bouchra Guevara MD  12/21/2021  3:05 PM      Thank you Dr. Austin Jeronimo MD for the opportunity to be involved in this patient's care.

## 2021-12-21 NOTE — ED PROVIDER NOTES
EMERGENCY DEPARTMENT ENCOUNTER    Pt Name: Gatito Aggarwal  MRN: [id number]  [place] 1945  Date of evaluation: 12/20/21  CHIEF COMPLAINT       Chief Complaint   Patient presents with    Chest Pain     started 1pm today midsternal radiating to under right breast      HISTORY OF PRESENT ILLNESS   66-year-old female presents emergency room for epigastric chest discomfort that started this afternoon around 1 PM. Patient states that she is concerned about heart attack. She has had previous MI and reports similar symptoms. Patient does not have any stent she states that when she had a cath they told her that the stent could not be placed. She is on medical management. Cardiologist is Dr. Marylen Heater. She has some associated nausea. Pain is a dull pain in the epigastric region that moves down into the stomach. Pain has been constant in nature. REVIEW OF SYSTEMS     Review of Systems   Constitutional: Negative for activity change, chills and diaphoresis. HENT: Negative for congestion, sinus pain and tinnitus. Eyes: Negative. Respiratory: Negative for apnea, chest tightness and shortness of breath. Cardiovascular: Positive for chest pain. Gastrointestinal: Negative for abdominal distention, constipation, diarrhea and vomiting. Genitourinary: Negative for difficulty urinating and frequency. Musculoskeletal: Negative for arthralgias and myalgias. Skin: Negative for color change and rash. Neurological: Negative for dizziness. Hematological: Negative. Psychiatric/Behavioral: Negative.         PASTMEDICAL HISTORY     Past Medical History:   Diagnosis Date    Arthritis     CAD (coronary artery disease)     Elevated liver enzymes     Esophageal reflux     Foot drop, right foot     Heart attack [facility])     patient does not know when    History of Clostridium difficile infection     treated on 08/12/17    HL (hearing loss)     Hyperlipidemia     Hypertension     Hypothyroidism     Lumbar Details   HYDROcodone-acetaminophen (NORCO) 5-325 MG per tablet Take 1 tablet by mouth every 8 hours as needed for Pain for up to 10 days. , Disp-30 tablet, R-0Normal      bumetanide (BUMEX) 0.5 MG tablet Take 1 tablet by mouth daily, Disp-30 tablet, R-2Normal      atorvastatin (LIPITOR) 20 MG tablet Take 1 tablet by mouth daily, Disp-90 tablet, R-1Normal      omeprazole (PRILOSEC) 40 MG delayed release capsule TAKE ONE CAPSULE BY MOUTH DAILY, Disp-30 capsule, R-5Normal      tiZANidine (ZANAFLEX) 4 MG tablet TAKE ONE TABLET BY MOUTH FOUR TIMES A DAY AS NEEDED FOR MUSCLE SPASMS, Disp-40 tablet, R-0Normal      levothyroxine (SYNTHROID) 100 MCG tablet TAKE ONE TABLET BY MOUTH DAILY, Disp-90 tablet, R-3Normal      traZODone (DESYREL) 50 MG tablet TAKE ONE TABLET BY MOUTH ONCE NIGHTLY, Disp-30 tablet, R-5Normal      metoprolol succinate (TOPROL XL) 25 MG extended release tablet Take 25 mg by mouth dailyHistorical Med      spironolactone (ALDACTONE) 25 MG tablet Take 25 mg by mouth dailyHistorical Med      sacubitril-valsartan (ENTRESTO) 24-26 MG per tablet Take 1 tablet by mouth 2 times dailyHistorical Med      nitroGLYCERIN (NITROSTAT) 0.4 MG SL tablet up to max of 3 total doses.  If no relief after 1 dose, call 911., Disp-25 tablet, R-3Normal      atenolol (TENORMIN) 25 MG tablet TAKE ONE TABLET BY MOUTH DAILY, Disp-90 tablet, R-2Normal      aspirin 81 MG tablet Take 325 mg by mouth 2 times daily Historical Med      Multiple Vitamins-Minerals (CENTRUM SILVER PO) Take 1 tablet by mouth daily      gabapentin (NEURONTIN) 600 MG tablet TAKE ONE TABLET BY MOUTH THREE TIMES A DAY, Disp-270 tablet, R-1Normal      diclofenac sodium (VOLTAREN) 1 % GEL Apply 4 g topically every 4 hours as needed for Pain, Topical, EVERY 4 HOURS PRN Starting Wed 8/26/2020, Disp-1 Tube, R-5, Normal      triamcinolone (KENALOG) 0.025 % cream Apply 1 applicator topically 2 times daily Indications: on face Apply Topically, Topical, 2 TIMES DAILY, Until history plan is for admission and cardiac evaluation. Admission is pending CTA results to rule out pulmonary embolism. Case signed out to Dr. Martín Brantley at shift change. CRITICAL CARE:       PROCEDURES:    Procedures    DIAGNOSTIC RESULTS   EKG:All EKG's are interpreted by the Emergency Department Physician who either signs or Co-signs this chart in the absence of a cardiologist.    EKG sinus rate 51  Nonspecific T wave changes  No STEMI criteria    QTc 438  Nonspecific EKG  No significant change from previous EKG from 5/24/2021    RADIOLOGY:All plain film, CT, MRI, and formal ultrasound images (except ED bedside ultrasound) are read by the radiologist, see reports below, unless otherwisenoted in MDM or here. CT CHEST PULMONARY EMBOLISM W CONTRAST   Final Result   No evidence of pulmonary embolism or acute pulmonary abnormality. RECOMMENDATIONS:   Unavailable         XR CHEST PORTABLE   Final Result   Mild pulmonary vascular congestive change. Cardiomegaly. No significant   pleural effusion. LABS: All lab results were reviewed by myself, and all abnormals are listed below.   Labs Reviewed   COMPREHENSIVE METABOLIC PANEL W/ REFLEX TO MG FOR LOW K - Abnormal; Notable for the following components:       Result Value    Glucose 116 (*)     CREATININE 0.97 (*)     Potassium 3.2 (*)     Chloride 96 (*)     ALT 48 (*)     AST 86 (*)     Total Bilirubin 2.16 (*)     GFR Non- 56 (*)     All other components within normal limits   CBC WITH AUTO DIFFERENTIAL - Abnormal; Notable for the following components:    RBC 3.37 (*)     Hemoglobin 11.0 (*)     Hematocrit 33.2 (*)     Seg Neutrophils 86 (*)     Lymphocytes 11 (*)     Eosinophils % 0 (*)     Segs Absolute 8.60 (*)     All other components within normal limits   LIPASE - Abnormal; Notable for the following components:    Lipase 78 (*)     All other components within normal limits   TROPONIN - Abnormal; Notable for the following components:    Troponin, High Sensitivity 27 (*)     All other components within normal limits   TROPONIN - Abnormal; Notable for the following components:    Troponin, High Sensitivity 26 (*)     All other components within normal limits   MAGNESIUM - Abnormal; Notable for the following components:    Magnesium 1.3 (*)     All other components within normal limits   D-DIMER, QUANTITATIVE - Abnormal; Notable for the following components:    D-Dimer, Quant 0.73 (*)     All other components within normal limits   FERRITIN - Abnormal; Notable for the following components:    Ferritin 509 (*)     All other components within normal limits   LACTATE DEHYDROGENASE - Abnormal; Notable for the following components:     (*)     All other components within normal limits   BASIC METABOLIC PANEL W/ REFLEX TO MG FOR LOW K - Abnormal; Notable for the following components:    Glucose 117 (*)     CREATININE 0.93 (*)     GFR Non- 58 (*)     All other components within normal limits   CBC - Abnormal; Notable for the following components:    WBC 14.1 (*)     RBC 3.51 (*)     Hemoglobin 11.4 (*)     Hematocrit 34.9 (*)     All other components within normal limits   PROCALCITONIN - Abnormal; Notable for the following components:    Procalcitonin 0.14 (*)     All other components within normal limits   C-REACTIVE PROTEIN - Abnormal; Notable for the following components:    CRP 20.1 (*)     All other components within normal limits   D-DIMER, QUANTITATIVE - Abnormal; Notable for the following components:    D-Dimer, Quant 0.91 (*)     All other components within normal limits   TSH WITH REFLEX - Abnormal; Notable for the following components:    TSH 12.19 (*)     All other components within normal limits   TROP/MYOGLOBIN - Abnormal; Notable for the following components:    Troponin, High Sensitivity 66 (*)     Myoglobin 139 (*)     All other components within normal limits   T4, FREE - Abnormal; Notable for the following components:    Thyroxine, Free 0.50 (*)     All other components within normal limits   COVID-19, RAPID   RESPIRATORY PANEL, MOLECULAR, WITH COVID-19   PROCALCITONIN       EMERGENCY DEPARTMENTCOURSE:         Vitals:    Vitals:    12/21/21 0300 12/21/21 0415 12/21/21 0645 12/21/21 1108   BP: 104/63 (!) 107/44 (!) 116/44 (!) 112/47   Pulse: 89 60 59 68   Resp: 17  18 18   Temp:  97.9 °F (36.6 °C) 98.4 °F (36.9 °C) 97.5 °F (36.4 °C)   TempSrc:  Oral Oral Oral   SpO2: 93% 97% 100% 98%   Weight:       Height:           The patient was given the following medications while in the emergency department:  Orders Placed This Encounter   Medications    aspirin chewable tablet 324 mg    nitroGLYCERIN (NITROSTAT) SL tablet 0.4 mg    ondansetron (ZOFRAN) injection 4 mg    0.9 % sodium chloride bolus    morphine (PF) injection 2 mg    aluminum & magnesium hydroxide-simethicone (MAALOX) 30 mL, lidocaine viscous hcl (XYLOCAINE) 5 mL (GI COCKTAIL)    0.9 % sodium chloride bolus    iopamidol (ISOVUE-370) 76 % injection 75 mL    DISCONTD: sodium chloride flush 0.9 % injection 10 mL    DISCONTD: atenolol (TENORMIN) tablet 25 mg    DISCONTD: atorvastatin (LIPITOR) tablet 20 mg    DISCONTD: bumetanide (BUMEX) tablet 0.5 mg    DISCONTD: levothyroxine (SYNTHROID) tablet 100 mcg    DISCONTD: pantoprazole (PROTONIX) tablet 40 mg    DISCONTD: sodium chloride flush 0.9 % injection 5-40 mL    DISCONTD: sodium chloride flush 0.9 % injection 10 mL    DISCONTD: 0.9 % sodium chloride infusion    DISCONTD: potassium chloride (KLOR-CON M) extended release tablet 40 mEq    DISCONTD: potassium bicarb-citric acid (EFFER-K) effervescent tablet 40 mEq    DISCONTD: potassium chloride 10 mEq/100 mL IVPB (Peripheral Line)    DISCONTD: magnesium sulfate 1000 mg in dextrose 5% 100 mL IVPB    DISCONTD: acetaminophen (TYLENOL) tablet 650 mg    DISCONTD: acetaminophen (TYLENOL) suppository 650 mg    DISCONTD: enoxaparin (LOVENOX) injection 40 mg    DISCONTD: Vitamin D (CHOLECALCIFEROL) tablet 2,000 Units    DISCONTD: enoxaparin (LOVENOX) injection 60 mg    DISCONTD: ticagrelor (BRILINTA) tablet 90 mg     CONSULTS:  IP CONSULT TO INTERNAL MEDICINE  IP CONSULT TO CARDIOLOGY    FINAL IMPRESSION      1. Hypoxia    2. Atypical chest pain          DISPOSITION/PLAN   DISPOSITION Admitted 12/21/2021 03:33:08 AM      PATIENT REFERRED TO:  Mami Gaitan MD  [place] [address]  [phone]    Schedule an appointment as soon as possible for a visit      Jey Alfonso Elle [address] [place] [address]  [phone]    Schedule an appointment as soon as possible for a visit      DISCHARGE MEDICATIONS:  Discharge Medication List as of 12/21/2021 12:55 PM        Edu Hernandez MD  Attending Emergency Physician      Care during this encounter was due to an unprecedented national emergency due to COVID-19.            Pallavi Moore MD  12/27/21 5678

## 2021-12-21 NOTE — FLOWSHEET NOTE
Patient status is Droplet Plus   initiated phone contact, unsuccessfully   offered prayer of healing for patient   will remain available for emotional and spiritual care       12/21/21 0908   Encounter Summary   Services provided to: Patient   Referral/Consult From: Rounding   Continue Visiting   (12/21/21)   Complexity of Encounter Low   Length of Encounter 15 minutes   Routine   Type Initial   Assessment Unable to respond   Intervention Prayer   Outcome Did not respond

## 2021-12-21 NOTE — PLAN OF CARE
Problem: Falls - Risk of:  Goal: Will remain free from falls  Description: Will remain free from falls  Outcome: Ongoing  Goal: Absence of physical injury  Description: Absence of physical injury  Outcome: Ongoing     Problem: Pain:  Goal: Pain level will decrease  Description: Pain level will decrease  Outcome: Ongoing  Goal: Control of acute pain  Description: Control of acute pain  Outcome: Ongoing  Goal: Control of chronic pain  Description: Control of chronic pain  Outcome: Ongoing     Problem: Cardiac:  Goal: Ability to maintain an adequate cardiac output will improve  Description: Ability to maintain an adequate cardiac output will improve  Outcome: Ongoing  Goal: Complications related to the disease process, condition or treatment will be avoided or minimized  Description: Complications related to the disease process, condition or treatment will be avoided or minimized  Outcome: Ongoing  Goal: Hemodynamic stability will improve  Description: Hemodynamic stability will improve  Outcome: Ongoing  Goal: Risk factors for ineffective tissue perfusion will decrease  Description: Risk factors for ineffective tissue perfusion will decrease  Outcome: Ongoing     Problem: Fluid Volume:  Goal: Will show no signs or symptoms of fluid imbalance  Description: Will show no signs or symptoms of fluid imbalance  Outcome: Ongoing

## 2021-12-21 NOTE — PROGRESS NOTES
Pt transfered to room 2021 per bed in good condition from [facility]  Oriented to room and surroundings  Bed in lowest position, wheels locked, 2/4 side rails up  Call light in reach, room free of clutter, adequate lighting provided  Denies any further questions at this time  Instructed to call out with any questions/concerns/new onset of pain and/or n/v   White board updated  Continue to monitor with hourly rounding  Bed alarm on/Fall Risk signs in place/Fall risk sticker to wrist band  Non-skid socks on/at bedside  Patient denies pain at the moment

## 2021-12-21 NOTE — PROGRESS NOTES
Occupational Therapy   Occupational Therapy Initial Assessment  Date: 2021   Patient Name: Stefania Elizondo  MRN: [id number]     : 1945    RN Hector Theodore reports patient is medically stable for therapy treatment this date. Chart reviewed prior to treatment and patient is agreeable for therapy. All lines intact and patient positioned comfortably at end of treatment. All patient needs addressed prior to ending therapy session. Due to recent hospitalization and medical condition, pt would benefit from additional therapy at time of discharge to ensure safety. Please refer to the AM-PAC score for current functional status. Date of Service: 2021    Discharge Recommendations:  Patient would benefit from continued therapy after discharge  OT Equipment Recommendations  Equipment Needed: Yes  Mobility Devices: ADL Assistive Devices  ADL Assistive Devices: Long-handled Shoe Horn;Long-handled Sponge;Emergency Alert System    Assessment   Performance deficits / Impairments: Decreased functional mobility ; Decreased safe awareness;Decreased balance;Decreased ADL status; Decreased sensation;Decreased high-level IADLs;Decreased endurance  Assessment: Skilled OT is indicated to increase overall I and safety awareness with ADL and functional task performance to return home with assist as needed. Prognosis: Good  Decision Making: Medium Complexity  OT Education: OT Role;Plan of Care;Energy Conservation;Transfer Training  Patient Education: edema mgt tech, pursed lip breathing, safety in function, call light use/fall prevention, benefits of being up OOB as able, recommendations for continued therapy services  Barriers to Learning: memory deficits  REQUIRES OT FOLLOW UP: Yes  Activity Tolerance  Activity Tolerance: Patient limited by fatigue (limited by resp status)  Activity Tolerance: fair minus  Safety Devices  Safety Devices in place: Yes  Type of devices: Call light within reach; Chair alarm in place; Left in chair;Patient at risk for falls;Gait belt;Nurse notified (BLE's elevated on stool/pillow under. Pt was edu on benefits of elevation and B ankle AROM/pumping as able to reduce swelling. Pt with good understanding.)           Patient Diagnosis(es): The primary encounter diagnosis was Hypoxia. A diagnosis of Atypical chest pain was also pertinent to this visit. has a past medical history of Arthritis, CAD (coronary artery disease), Elevated liver enzymes, Esophageal reflux, Foot drop, right foot, Heart attack ([address] History of Clostridium difficile infection, HL (hearing loss), Hyperlipidemia, Hypertension, Hypothyroidism, Lumbar radiculopathy, Osteoarthritis, Osteopenia, Psychiatric problem, and Tremor. has a past surgical history that includes Cardiac catheterization (01/2015, 9/2017); bladder suspension; Tonsillectomy and adenoidectomy; Dilation and curettage of uterus; Tooth Extraction; Total hip arthroplasty (Left, 03/07/2017); joint replacement (Right, 2008); joint replacement (Left, 2017); Colonoscopy (2012); Colonoscopy (08/04/2017); Upper gastrointestinal endoscopy (08/04/2017); pr egd transoral biopsy single/multiple (N/A, 8/4/2017); and pr colonoscopy w/biopsy single/multiple (N/A, 8/4/2017). PER H&P: Ninoska Capellan is a 64 y.o.  Non- / non  female who presents with Chest Pain (started 1pm today midsternal radiating to under right breast )   and is admitted to the hospital for the management of Hypoxia    Restrictions  Restrictions/Precautions  Restrictions/Precautions: Contact Precautions,Isolation,Fall Risk,Up as Tolerated  Required Braces or Orthoses?: No  Position Activity Restriction  Other position/activity restrictions: DROPLET plus isolation, 2 L O2 per NC, LUE IV, alarms    Subjective   General  Chart Reviewed: Yes  Patient assessed for rehabilitation services?: Yes  Family / Caregiver Present: No  Patient Currently in Pain: Denies  Vital Signs  Patient Currently in cues/tactile assist for upright posture, scanning room, pacing, pursed lip breathing, and awareness/assist with lines to increase overall safety/reduce falls. Toilet Transfers  Toilet Transfers Comments: N/T and pt with no needs     ADL  Feeding: Independent  Grooming: Setup;Supervision (seated)  UE Bathing: Setup;Stand by assistance  LE Bathing: Setup; Moderate assistance  UE Dressing: Setup;Minimal assistance with hosp gown   LE Dressing: Setup;Maximum assistance (for B socks and pt used sock aid PTA and device not in room during eval)  Toileting: Minimal assistance with gown mgt   Tone RUE  RUE Tone: Normotonic  Tone LUE  LUE Tone: Normotonic     Bed mobility  Supine to Sit: Stand by assistance  Sit to Supine: Unable to assess (pt agreed to sit up in chair after edu on the benefits of being up OOB as able)  Comment: MIN cues needed for pursed lip breathing and pt with good use of rails as well as awareness/assist with lines to increase overall safety. Transfers  Stand Step Transfers: Contact guard assistance (with RW)  Sit to stand: Contact guard assistance  Stand to sit: Contact guard assistance  Transfer Comments: MOD cues/tactile assist for hand placement reaching back to surface, upright posture, pacing, scanning, pursed lip breathing, keeping AD with pt AAT's, and awareness/assist with lines to increase overall safety. Cognition  Overall Cognitive Status: Exceptions  Arousal/Alertness: Appropriate responses to stimuli  Following Commands: Follows multistep commands with increased time; Follows multistep commands with repitition  Attention Span: Attends with cues to redirect  Memory: Decreased short term memory  Safety Judgement: Decreased awareness of need for safety;Decreased awareness of need for assistance  Problem Solving: Assistance required to identify errors made;Assistance required to correct errors made;Decreased awareness of errors  Insights: Decreased awareness of deficits  Initiation: Does not require cues  Sequencing: Does not require cues  Cognition Comment: Pt can be impulsive. Perception  Overall Perceptual Status: WFL     Sensation  Overall Sensation Status: Impaired (pt c/o constant numbness in R foot; denies for BUE's)        LUE AROM (degrees)  LUE AROM : WFL  RUE AROM (degrees)  RUE AROM : WFL  LUE Strength  Gross LUE Strength: WFL  LUE Strength Comment: BUE strength grossly 4 plus/5  RUE Strength  Gross RUE Strength: WFL                   Plan   Plan  Times per week: 4-5x/week 1x/day as yaneli  Current Treatment Recommendations: Strengthening,Balance Training,Functional Mobility Training,Safety Education & Hosie Clap Re-education,Patient/Caregiver Education & Training,Equipment Evaluation, Education, & procurement,Self-Care / ADL,Home Management Training,Cognitive/Perceptual Training                                                    AM-PAC Score   17          Goals  Short term goals  Time Frame for Short term goals: by discharge, pt to demo  Short term goal 1: bed mob tasks with use of rail as needed to MOD I-I. Short term goal 2: I with BUE HEP with handouts as needed to maintain strength. Short term goal 3: UB ADL to set up and LB ADL to SBA with use of AD/AE. Short term goal 4: toileting tasks with use of grab bar/AD as needed to SUP. Short term goal 5: ADL transfers and functional mob with AD to SUP. Long term goals  Long term goal 1: Pt to stand with SUP with AD yaneli > 5 mins as able to reduce falls in function. Long term goal 2: Pt/caregivers to be I with pressure relief, EC/WS and fall prevention tech as well as DME/AE recommendations with use of handouts. Patient Goals   Patient goals : Get home today!        Therapy Time   Individual Concurrent Group Co-treatment   Time In 0952 (plus 10 min chart review/nursing communication)         Time Out 1036         Minutes 44=54 mins total          Timed Code Treatment Minutes: 31 Minutes Isabel Galicia, OT

## 2021-12-21 NOTE — PLAN OF CARE
Problem: Falls - Risk of:  Goal: Will remain free from falls  Description: Will remain free from falls  12/21/2021 1248 by Chris Solano RN  Outcome: Completed  12/21/2021 0502 by Sony Ann RN  Outcome: Ongoing  Goal: Absence of physical injury  Description: Absence of physical injury  12/21/2021 1248 by Chris Solano RN  Outcome: Completed  12/21/2021 0502 by Sony Ann RN  Outcome: Ongoing     Problem: Pain:  Goal: Pain level will decrease  Description: Pain level will decrease  12/21/2021 1248 by Chris Solano RN  Outcome: Completed  12/21/2021 0502 by Sony Ann RN  Outcome: Ongoing  Goal: Control of acute pain  Description: Control of acute pain  12/21/2021 1248 by Chris Solano RN  Outcome: Completed  12/21/2021 0502 by Sony Ann RN  Outcome: Ongoing  Goal: Control of chronic pain  Description: Control of chronic pain  12/21/2021 1248 by Chris Solano RN  Outcome: Completed  12/21/2021 0502 by Sony Ann RN  Outcome: Ongoing     Problem: Cardiac:  Goal: Ability to maintain an adequate cardiac output will improve  Description: Ability to maintain an adequate cardiac output will improve  12/21/2021 1248 by Chris Solano RN  Outcome: Completed  12/21/2021 0502 by Sony Ann RN  Outcome: Ongoing  Goal: Complications related to the disease process, condition or treatment will be avoided or minimized  Description: Complications related to the disease process, condition or treatment will be avoided or minimized  12/21/2021 1248 by Chris Solano RN  Outcome: Completed  12/21/2021 0502 by Sony Ann RN  Outcome: Ongoing  Goal: Hemodynamic stability will improve  Description: Hemodynamic stability will improve  12/21/2021 1248 by Chris Solano RN  Outcome: Completed  12/21/2021 0502 by Sony Ann RN  Outcome: Ongoing  Goal: Risk factors for ineffective tissue perfusion will decrease  Description: Risk factors for ineffective tissue perfusion will decrease  12/21/2021 1248 by Saskia Jaramillo RN  Outcome: Completed  12/21/2021 0502 by Kareen Nayak RN  Outcome: Ongoing     Problem: Fluid Volume:  Goal: Will show no signs or symptoms of fluid imbalance  Description: Will show no signs or symptoms of fluid imbalance  12/21/2021 1248 by Saskia Jaramillo RN  Outcome: Completed  12/21/2021 0502 by Kareen Nayak RN  Outcome: Ongoing

## 2021-12-21 NOTE — H&P
[facility]  Office: [address], DO, Gwen Nyhan, DO, Lydia Rodriguez, DO, Constantino Shad Haines, DO, Kalia Carbajal MD, Susi Moncada MD, Zander Wright MD, Edson Lopez MD, Edgar Acosta MD, John Snowden MD, Raven Wagoner MD, Joseph Srinivasan, DO, Audra Jacobson, DO, Randa Rodriguez MD,  Giovani Musa DO, Basil Byrnes MD, Gatito Antunez MD, Kyra Reynolds MD, Reshma Hamilton MD, Alfredo Ortiz MD, Criss Ferguson MD, Kee Carty MD, Myke Bonilla, [facility], [facility], CNP, Cindy Ndiaye, CNP, Chance Davidson, CNS, Sherman Brady, CNP, Genia Maharaj, CNP, Jennifer Hopper, CNP, Suhas Alomdovar, CNP, Radha Morrell, CNP, Yariel Lindsay PA-C, Lulu Cook, [facility], Vic Roa, [facility], Ranjith Mckenna, CNP, Kusum Mendiola, CNP, Radha Allen, CNP, Jeanette Rutherford, CNP, Melanie Morales, CNP, Kristen Myles, [facility]    HISTORY AND PHYSICAL EXAMINATION            Date:   12/21/2021  Patient name:  Rufus Contreras  Date of admission:  12/20/2021  9:16 PM  MRN:   [id number]  Account:  [de-identified]  YOB: 1945  PCP:    Debbie Edwards MD  Room:   2021/2021-01  Code Status:    Full Code    Chief Complaint:     Chief Complaint   Patient presents with    Chest Pain     started 1pm today midsternal radiating to under right breast        History Obtained From:     patient, electronic medical record    History of Present Illness:     Rufus Contreras is a 64 y.o. Non- / non  female who presents with Chest Pain (started 1pm today midsternal radiating to under right breast )   and is admitted to the hospital for the management of Hypoxia. She initially presented to the emergency room with complaints of epigastric pain that she describes as heartburn started at 1 PM yesterday. Mild nausea with the chest discomfort but denies vomiting. No dizziness, diaphoresis or shortness of breath.  According to ER records she had previous MI and states that the symptoms felt similar. She follows with Dr. Abel Maya. Echo 7/27/2018  Summary  Left ventricle is normal in size and function. Estimated LV EF 50-55%. Septal mild hypokinesis  Left atrium is at upper limits of normal.  No significant valvular regurgitation or stenosis seen. No pericardial effusion seen. Normal aortic root dimension.     Lexiscan stress test 7/27/2018  100% max predicted heart rate:  145%  85% max predicted heart rate:  123%  Resting heart rate:  68  Maximum heart rate achieved:  101  Duration:  0.40  Resting BP:  130/61  Peak BP:  140/72  Peak Double Product: 10,080  % of Age Predicted Maximum:  67  METS:  1.0  Medications Given: 0.4 mg Lexiscan  Reason for Termination:  Medication Infusion Complete. Heart Rate Response:  Normal.  Blood Pressure Response:  Normal response. Baseline EKG demonstrated:  Sinus rhythm, ST-T wave changes. The exercise study demonstrated a maximum of 1 mm of downsloping ST  depression in leads II, III, aVF, V4-V6.     Cardiac cath 10/11/2017  Cath EF calculated 55%  · Post trans radial cardiac catheterization    · Mild coronary disease affecting the mid LAD as well as mid circumflex     coronary artery and also mid proximal and mid right coronary artery with     an 80% ostial stenosis in the 2nd diagonal artery which is similar to what     was reported in 2015    · Normal left ventricular contractility    · No mitral regurgitation or aortic stenosis    · Retrospectively the chest pain and borderline troponin elevation may     have been caused by his severe hypertension the patient had when she     presented to the emergency room yesterday        Recommendations:  Conservative medical management, may discharge home in     few hours and follow up with me in the office next week.  Continue with     aggressive risk factor modification and optimal blood pressure control.    Past Medical History:     Past Medical History:   Diagnosis Date    Arthritis     CAD (coronary artery disease)     Elevated liver enzymes     Esophageal reflux     Foot drop, right foot     Heart attack [facility])     patient does not know when    History of Clostridium difficile infection     treated on 08/12/17    HL (hearing loss)     Hyperlipidemia     Hypertension     Hypothyroidism     Lumbar radiculopathy     Osteoarthritis     Osteopenia     Psychiatric problem     Tremor     hand        Past Surgical History:     Past Surgical History:   Procedure Laterality Date    BLADDER SUSPENSION      CARDIAC CATHETERIZATION  01/2015, 9/2017    COLONOSCOPY  2012    COLONOSCOPY  08/04/2017    PSEUDOMEMBRANOUS COLITIS WITH MUCOSAL ISCHEMIA, C DIFF POS    DILATION AND CURETTAGE OF UTERUS      JOINT REPLACEMENT Right [id number]    hip-complicated with rt drop foot-Dr. Savita Ashley JOINT REPLACEMENT Left 2017    hip-Dr. Thomas Lubin    [place] COLONOSCOPY W/BIOPSY SINGLE/MULTIPLE N/A 8/4/2017    COLONOSCOPY WITH BIOPSY performed by Chapito Obrien MD at [address] EGD TRANSORAL BIOPSY SINGLE/MULTIPLE N/A 8/4/2017    EGD BIOPSY performed by Chapito Obrien MD at [address] Left 03/07/2017    UPPER GASTROINTESTINAL ENDOSCOPY  08/04/2017    MILD CHRONIC GASTRITIS         Medications Prior to Admission:     Prior to Admission medications    Medication Sig Start Date End Date Taking? Authorizing Provider   HYDROcodone-acetaminophen (NORCO) 5-325 MG per tablet Take 1 tablet by mouth every 8 hours as needed for Pain for up to 10 days.  12/20/21 12/30/21 Yes Thi Mccauley MD   bumetanide (BUMEX) 0.5 MG tablet Take 1 tablet by mouth daily 12/3/21  Yes Thi Mccauley MD   atorvastatin (LIPITOR) 20 MG tablet Take 1 tablet by mouth daily 10/26/21  Yes Thi Mccauley MD   omeprazole (PRILOSEC) 40 MG delayed release capsule TAKE ONE CAPSULE BY MOUTH DAILY 8/25/21  Yes Thi Mccauley MD   tiZANidine (ZANAFLEX) 4 MG tablet TAKE ONE TABLET BY MOUTH FOUR TIMES A DAY AS NEEDED FOR MUSCLE SPASMS 8/19/21  Yes Gurwinder Malagon MD   levothyroxine (SYNTHROID) 100 MCG tablet TAKE ONE TABLET BY MOUTH DAILY 6/26/21  Yes Gurwinder Malagon MD   traZODone (DESYREL) 50 MG tablet TAKE ONE TABLET BY MOUTH ONCE NIGHTLY 4/16/21  Yes Gurwinder Malagon MD   metoprolol succinate (TOPROL XL) 25 MG extended release tablet Take 25 mg by mouth daily   Yes Historical Provider, MD   spironolactone (ALDACTONE) 25 MG tablet Take 25 mg by mouth daily   Yes Historical Provider, MD   sacubitril-valsartan (ENTRESTO) 24-26 MG per tablet Take 1 tablet by mouth 2 times daily   Yes Historical Provider, MD   nitroGLYCERIN (NITROSTAT) 0.4 MG SL tablet up to max of 3 total doses. If no relief after 1 dose, call 911. 1/3/21  Yes Erica Ojeda,    atenolol (TENORMIN) 25 MG tablet TAKE ONE TABLET BY MOUTH DAILY 8/21/20  Yes Gurwinder Malagon MD   aspirin 81 MG tablet Take 325 mg by mouth 2 times daily    Yes Historical Provider, MD   Multiple Vitamins-Minerals (CENTRUM SILVER PO) Take 1 tablet by mouth daily   Yes Historical Provider, MD   gabapentin (NEURONTIN) 600 MG tablet TAKE ONE TABLET BY MOUTH THREE TIMES A DAY 6/28/21 10/4/21  Gurwinder Malagon MD   diclofenac sodium (VOLTAREN) 1 % GEL Apply 4 g topically every 4 hours as needed for Pain 8/26/20   Gurwinder Malagon MD   triamcinolone (KENALOG) 0.025 % cream Apply 1 applicator topically 2 times daily Indications: on face Apply Topically    Historical Provider, MD        Allergies:     Patient has no known allergies. Social History:     Tobacco:    reports that she quit smoking about 14 years ago. She smoked 0.00 packs per day for 30.00 years. She has never used smokeless tobacco.  Alcohol:      reports current alcohol use. Drug Use:  reports no history of drug use.     Family History:     Family History   Problem Relation Age of Onset    Heart Disease Mother     Heart Disease Father     Arthritis Father     Heart Disease Brother        Review of Systems:     Positive and Negative as described in HPI. Review of Systems   Constitutional: Negative for activity change, diaphoresis and fever. HENT: Negative for congestion, sinus pain and sore throat. Respiratory: Negative for cough and shortness of breath. Cardiovascular: Positive for chest pain. Gastrointestinal: Positive for nausea. Negative for vomiting. Musculoskeletal: Negative for arthralgias and myalgias. Neurological: Negative for dizziness and headaches. Psychiatric/Behavioral: Negative for confusion and decreased concentration. Physical Exam:   BP (!) 116/44   Pulse 59   Temp 98.4 °F (36.9 °C) (Oral)   Resp 18   Ht 5' 5\" (1.651 m)   Wt 135 lb (61.2 kg)   SpO2 100%   BMI 22.47 kg/m²   Temp (24hrs), Av.1 °F (36.7 °C), Min:97.9 °F (36.6 °C), Max:98.4 °F (36.9 °C)    No results for input(s): POCGLU in the last 72 hours. Intake/Output Summary (Last 24 hours) at 2021 0835  Last data filed at 2021 0630  Gross per 24 hour   Intake    Output 450 ml   Net -450 ml       Physical Exam  Constitutional:       Appearance: She is not toxic-appearing. HENT:      Right Ear: External ear normal.      Left Ear: External ear normal.   Eyes:      General:         Right eye: No discharge. Left eye: No discharge. Conjunctiva/sclera: Conjunctivae normal.   Cardiovascular:      Rate and Rhythm: Normal rate and regular rhythm. Pulmonary:      Effort: No respiratory distress. Breath sounds: Normal breath sounds. Abdominal:      General: There is no distension. Palpations: Abdomen is soft. Tenderness: There is no abdominal tenderness. Musculoskeletal:      Right lower leg: No edema. Left lower leg: No edema. Skin:     General: Skin is warm and dry. Neurological:      General: No focal deficit present. Mental Status: She is alert and oriented to person, place, and time.    Psychiatric:         Mood and Affect: Mood normal.         Behavior: Behavior normal.         Investigations:      Laboratory Testing:  Recent Results (from the past 24 hour(s))   EKG 12 Lead    Collection Time: 12/20/21  9:00 PM   Result Value Ref Range    Ventricular Rate 51 BPM    Atrial Rate 51 BPM    P-R Interval 128 ms    QRS Duration 84 ms    Q-T Interval 476 ms    QTc Calculation (Bazett) 438 ms    P Axis 72 degrees    R Axis 42 degrees    T Axis 82 degrees   Comprehensive Metabolic Panel w/ Reflex to MG    Collection Time: 12/20/21 10:03 PM   Result Value Ref Range    Glucose 116 (H) 70 - 99 mg/dL    BUN 18 8 - 23 mg/dL    CREATININE 0.97 (H) 0.50 - 0.90 mg/dL    Bun/Cre Ratio 19 9 - 20    Calcium 9.7 8.6 - 10.4 mg/dL    Sodium 136 135 - 144 mmol/L    Potassium 3.2 (L) 3.7 - 5.3 mmol/L    Chloride 96 (L) 98 - 107 mmol/L    CO2 23 20 - 31 mmol/L    Anion Gap 17 9 - 17 mmol/L    Alkaline Phosphatase 103 35 - 104 U/L    ALT 48 (H) 5 - 33 U/L    AST 86 (H) <32 U/L    Total Bilirubin 2.16 (H) 0.3 - 1.2 mg/dL    Total Protein 7.4 6.4 - 8.3 g/dL    Albumin 4.5 3.5 - 5.2 g/dL    Albumin/Globulin Ratio NOT REPORTED 1.0 - 2.5    GFR Non- 56 (L) >60 mL/min    GFR African American >60 >60 mL/min    GFR Comment          GFR Staging NOT REPORTED    CBC Auto Differential    Collection Time: 12/20/21 10:03 PM   Result Value Ref Range    WBC 10.1 3.5 - 11.3 k/uL    RBC 3.37 (L) 3.95 - 5.11 m/uL    Hemoglobin 11.0 (L) 11.9 - 15.1 g/dL    Hematocrit 33.2 (L) 36.3 - 47.1 %    MCV 98.5 82.6 - 102.9 fL    MCH 32.6 25.2 - 33.5 pg    MCHC 33.1 28.4 - 34.8 g/dL    RDW 13.3 11.8 - 14.4 %    Platelets 514 610 - 805 k/uL    MPV 9.6 8.1 - 13.5 fL    NRBC Automated 0.0 0.0 per 100 WBC    Differential Type NOT REPORTED     Seg Neutrophils 86 (H) 36 - 65 %    Lymphocytes 11 (L) 24 - 43 %    Monocytes 3 3 - 12 %    Eosinophils % 0 (L) 1 - 4 %    Basophils 0 0 - 2 %    Immature Granulocytes 0 0 %    Segs Absolute 8.60 (H) 1.50 - 8.10 k/uL    Absolute Lymph # 1.12 1.10 - 3.70 k/uL    Absolute Mono # 0.26 0.10 - 1.20 k/uL    Absolute Eos # <0.03 0.00 - 0.44 k/uL    Basophils Absolute 0.04 0.00 - 0.20 k/uL    Absolute Immature Granulocyte 0.03 0.00 - 0.30 k/uL    WBC Morphology NOT REPORTED     RBC Morphology NOT REPORTED     Platelet Estimate NOT REPORTED    Lipase    Collection Time: 12/20/21 10:03 PM   Result Value Ref Range    Lipase 78 (H) 13 - 60 U/L   Troponin    Collection Time: 12/20/21 10:03 PM   Result Value Ref Range    Troponin, High Sensitivity 27 (H) 0 - 14 ng/L    Troponin T NOT REPORTED <0.03 ng/mL    Troponin Interp NOT REPORTED    Magnesium    Collection Time: 12/20/21 10:03 PM   Result Value Ref Range    Magnesium 1.3 (L) 1.6 - 2.6 mg/dL   EKG 12 Lead    Collection Time: 12/20/21 10:53 PM   Result Value Ref Range    Ventricular Rate 47 BPM    Atrial Rate 47 BPM    P-R Interval 132 ms    QRS Duration 86 ms    Q-T Interval 524 ms    QTc Calculation (Bazett) 463 ms    P Axis 80 degrees    R Axis 43 degrees    T Axis 86 degrees   Troponin    Collection Time: 12/21/21  1:45 AM   Result Value Ref Range    Troponin, High Sensitivity 26 (H) 0 - 14 ng/L    Troponin T NOT REPORTED <0.03 ng/mL    Troponin Interp NOT REPORTED    D-Dimer, Quantitative    Collection Time: 12/21/21  1:45 AM   Result Value Ref Range    D-Dimer, Quant 0.73 (H) 0.00 - 0.59 mg/L FEU   Ferritin    Collection Time: 12/21/21  1:45 AM   Result Value Ref Range    Ferritin 509 (H) 13 - 150 ug/L   Lactate Dehydrogenase    Collection Time: 12/21/21  1:45 AM   Result Value Ref Range     (H) 135 - 214 U/L   Basic Metabolic Panel w/ Reflex to MG    Collection Time: 12/21/21  7:20 AM   Result Value Ref Range    Glucose 117 (H) 70 - 99 mg/dL    BUN 16 8 - 23 mg/dL    CREATININE 0.93 (H) 0.50 - 0.90 mg/dL    Bun/Cre Ratio 17 9 - 20    Calcium 8.8 8.6 - 10.4 mg/dL    Sodium 140 135 - 144 mmol/L    Potassium 3.7 3.7 - 5.3 mmol/L    Chloride 101 98 - 107 mmol/L    CO2 23 20 - 31 mmol/L Anion Gap 16 9 - 17 mmol/L    GFR Non-African American 58 (L) >60 mL/min    GFR African American >60 >60 mL/min    GFR Comment          GFR Staging NOT REPORTED    CBC    Collection Time: 12/21/21  7:20 AM   Result Value Ref Range    WBC 14.1 (H) 3.5 - 11.3 k/uL    RBC 3.51 (L) 3.95 - 5.11 m/uL    Hemoglobin 11.4 (L) 11.9 - 15.1 g/dL    Hematocrit 34.9 (L) 36.3 - 47.1 %    MCV 99.4 82.6 - 102.9 fL    MCH 32.5 25.2 - 33.5 pg    MCHC 32.7 28.4 - 34.8 g/dL    RDW 13.8 11.8 - 14.4 %    Platelets 170 365 - 029 k/uL    MPV 9.5 8.1 - 13.5 fL    NRBC Automated 0.0 0.0 per 100 WBC   D-Dimer, Quantitative    Collection Time: 12/21/21  7:20 AM   Result Value Ref Range    D-Dimer, Quant 0.91 (H) 0.00 - 0.59 mg/L FEU       Imaging/Diagnostics:  XR CHEST PORTABLE    Result Date: 12/20/2021  Mild pulmonary vascular congestive change. Cardiomegaly. No significant pleural effusion. CT CHEST PULMONARY EMBOLISM W CONTRAST    Result Date: 12/21/2021  No evidence of pulmonary embolism or acute pulmonary abnormality. RECOMMENDATIONS: Unavailable       Assessment :      Hospital Problems           Last Modified POA    * (Principal) Hypoxia 12/21/2021 Yes    Essential hypertension 12/21/2021 Yes    Acquired hypothyroidism 12/21/2021 Yes    Mixed hyperlipidemia 12/21/2021 Yes    Coronary artery disease involving native coronary artery of native heart with angina pectoris ([facility] [address] 12/21/2021 Yes          Plan:     Patient status inpatient in the  Med/Surge    Inpatient admission on telemetry  Droplet precautions while awaiting Covid work-up  Trend troponin. Initially was 27 followed by 26. Due to her cardiac history she will likely need a stress test  Supplemental oxygen as needed  Check TSH. Continue levothyroxine  Check lipids. Continue atorvastatin  To do additional home medications  Monitor vital signs  Follow chemistry  DVT prophylaxis.   Lovenox    Consultations:   IP CONSULT TO INTERNAL MEDICINE    Patient is admitted as inpatient status because of co-morbidities listed above, severity of signs and symptoms as outlined, requirement for current medical therapies and most importantly because of direct risk to patient if care not provided in a hospital setting. Expected length of stay > 48 hours.     SHANELLE Daigle - CNP  12/21/2021  8:35 AM    Copy sent to Dr. Jeb Keane MD

## 2021-12-21 NOTE — FLOWSHEET NOTE
Pt admitted to room 2108 per good condition from ED   Oriented to room and surroundings  Bed in lowest position, wheels locked, 2/4 side rails up  Call light in reach, room free of clutter, adequate lighting provided  Denies any further questions at this time  Instructed to call out with any questions/concerns/new onset of pain and/or n/v   White board updated  Continue to monitor with hourly rounding  STAY WITH ME protocol initiated   Bed alarm on/Fall Risk signs in place/Fall risk sticker to wrist band  Non-skid socks on.

## 2021-12-21 NOTE — PLAN OF CARE
Late entry:   11.40am Received call from RN: patient wants to go home. Patient's latest troponin elevated at 66 and plan was to switch to full dose lovenox and cardiology evaluation. Patient apparently wanting to go home to take care of her . She left AMA before I could talk to her personally.      Rafal Valencia MD

## 2021-12-21 NOTE — CONSULTS
[facility] Cardiology   Consult Note             Date:   12/21/2021  Patient name: Kane Jaramillo  Date of admission:  12/20/2021  9:16 PM  MRN:   [id number]  YOB: 1945    Reason for Admission:  Chest pain    CHIEF COMPLAINT:  Chest pain and hypoxia    History Obtained From:  patient, electronic medical record    HISTORY OF PRESENT ILLNESS:      The patient is a 64 y.o.  female who is admitted to the hospital for chest pain and hypoxia. She is well known to our cardiology service. She had cardiac catheterization in 2017 which showed significant disease involving the optuse marginal artery. This has been stable for years and is medically managed. She presented with midsternal pain radiating to the left breast. Troponins were negative then became elevated. She also was found to be profoundly hypoxic. Cardiology was consulted to help manage nstemi. Past Medical History:   has a past medical history of Arthritis, CAD (coronary artery disease), Elevated liver enzymes, Esophageal reflux, Foot drop, right foot, Heart attack ([address] History of Clostridium difficile infection, HL (hearing loss), Hyperlipidemia, Hypertension, Hypothyroidism, Lumbar radiculopathy, Osteoarthritis, Osteopenia, Psychiatric problem, and Tremor. Past Surgical History:   has a past surgical history that includes Cardiac catheterization (01/2015, 9/2017); bladder suspension; Tonsillectomy and adenoidectomy; Dilation and curettage of uterus; Tooth Extraction; Total hip arthroplasty (Left, 03/07/2017); joint replacement (Right, 2008); joint replacement (Left, 2017); Colonoscopy (2012); Colonoscopy (08/04/2017); Upper gastrointestinal endoscopy (08/04/2017); pr egd transoral biopsy single/multiple (N/A, 8/4/2017); and pr colonoscopy w/biopsy single/multiple (N/A, 8/4/2017). Home Medications:    Prior to Admission medications    Medication Sig Start Date End Date Taking?  Authorizing Provider   HYDROcodone-acetaminophen (NORCO) 5-325 MG per tablet Take 1 tablet by mouth every 8 hours as needed for Pain for up to 10 days. 12/20/21 12/30/21 Yes Don Londono MD   bumetanide (BUMEX) 0.5 MG tablet Take 1 tablet by mouth daily 12/3/21  Yes Don Londono MD   atorvastatin (LIPITOR) 20 MG tablet Take 1 tablet by mouth daily 10/26/21  Yes Don Londono MD   omeprazole (PRILOSEC) 40 MG delayed release capsule TAKE ONE CAPSULE BY MOUTH DAILY 8/25/21  Yes Don Londono MD   tiZANidine (ZANAFLEX) 4 MG tablet TAKE ONE TABLET BY MOUTH FOUR TIMES A DAY AS NEEDED FOR MUSCLE SPASMS 8/19/21  Yes Don Londono MD   levothyroxine (SYNTHROID) 100 MCG tablet TAKE ONE TABLET BY MOUTH DAILY 6/26/21  Yes Don Londono MD   traZODone (DESYREL) 50 MG tablet TAKE ONE TABLET BY MOUTH ONCE NIGHTLY 4/16/21  Yes Don Londono MD   metoprolol succinate (TOPROL XL) 25 MG extended release tablet Take 25 mg by mouth daily   Yes Historical Provider, MD   spironolactone (ALDACTONE) 25 MG tablet Take 25 mg by mouth daily   Yes Historical Provider, MD   sacubitril-valsartan (ENTRESTO) 24-26 MG per tablet Take 1 tablet by mouth 2 times daily   Yes Historical Provider, MD   nitroGLYCERIN (NITROSTAT) 0.4 MG SL tablet up to max of 3 total doses.  If no relief after 1 dose, call 911. 1/3/21  Yes Manuelito Ricks DO   atenolol (TENORMIN) 25 MG tablet TAKE ONE TABLET BY MOUTH DAILY 8/21/20  Yes Don Londono MD   aspirin 81 MG tablet Take 325 mg by mouth 2 times daily    Yes Historical Provider, MD   Multiple Vitamins-Minerals (CENTRUM SILVER PO) Take 1 tablet by mouth daily   Yes Historical Provider, MD   gabapentin (NEURONTIN) 600 MG tablet TAKE ONE TABLET BY MOUTH THREE TIMES A DAY 6/28/21 10/4/21  Don Londono MD   diclofenac sodium (VOLTAREN) 1 % GEL Apply 4 g topically every 4 hours as needed for Pain 8/26/20   Don Londono MD   triamcinolone (KENALOG) 0.025 % cream Apply 1 applicator topically 2 times daily Indications: on face Apply Topically Historical Provider, MD       Allergies:  Patient has no known allergies. Social History:   reports that she quit smoking about 14 years ago. She smoked 0.00 packs per day for 30.00 years. She has never used smokeless tobacco. She reports current alcohol use. She reports that she does not use drugs. Family History: family history includes Arthritis in her father; Heart Disease in her brother, father, and mother. No for premature CAD. No for h/o sudden cardiac death    REVIEW OF SYSTEMS:    · Constitutional: there has been no unanticipated weight loss. There's been No change in energy level, No change in activity level. · Eyes: No visual changes or diplopia. No scleral icterus. · ENT: No Headaches, hearing loss or vertigo. No mouth sores or sore throat. · Cardiovascular: Yes chest pain, Yes dyspnea on exertion, No palpitations or No loss of consciousness. No cough, hemoptysis, No pleuritic pain, or phlebitis. · Respiratory: No cough or wheezing, no sputum production. No hematemesis. · Gastrointestinal: No abdominal pain, appetite loss, blood in stools. No change in bowel or bladder habits. · Genitourinary: No dysuria, trouble voiding, or hematuria. · Musculoskeletal:  No gait disturbance, No weakness or joint complaints. · Integumentary: No rash or pruritis. · Neurological: No headache, diplopia, change in muscle strength, numbness or tingling. No change in gait, balance, coordination, mood, affect, memory, mentation, behavior. · Psychiatric: No anxiety, or depression. · Endocrine: No temperature intolerance. No excessive thirst, fluid intake, or urination. No tremor. · Hematologic/Lymphatic: No abnormal bruising or bleeding, blood clots or swollen lymph nodes. · Allergic/Immunologic: No nasal congestion or hives.     PHYSICAL EXAM:    Physical Examination:    BP (!) 112/47   Pulse 68   Temp 97.5 °F (36.4 °C) (Oral)   Resp 18   Ht 5' 5\" (1.651 m)   Wt 135 lb (61.2 kg)   SpO2 98%   BMI 22.47 kg/m²    Constitutional and General Appearance: alert, cooperative, no distress and appears stated age  HEENT: PERRL, no cervical lymphadenopathy. No masses palpable. Normal oral mucosa  Respiratory:  · Normal excursion and expansion without use of accessory muscles  · Resp Auscultation: Good respiratory effort. No for increased work of breathing. On auscultation: clear to auscultation bilaterally  Cardiovascular:  · The apical impulse is not displaced  · Heart tones are crisp and normal. regular S1 and S2.  · Jugular venous pulsation Normal  · The carotid upstroke is normal in amplitude and contour without delay or bruit  · Peripheral pulses are symmetrical and full   Abdomen:  · No masses or tenderness  · Bowel sounds present  Extremities:  ·  No Cyanosis or Clubbing  ·  Lower extremity edema: No  ·  Skin: Warm and dry  Neurological:  · Alert and oriented. · Moves all extremities well  · No abnormalities of mood, affect, memory, mentation, or behavior are noted    DATA:    Diagnostics:      EKG: normal sinus rhythm  ECHO: Left ventricle is normal in size and function. Estimated LV EF 50-55%. Septal mild hypokinesis  Left atrium is at upper limits of normal.  No significant valvular regurgitation or stenosis seen. No pericardial effusion seen. Normal aortic root dimension. Stress test in 2018  FINDINGS:   Images interpreted utilizing [employer] system and [employer].              Small mild reversible anterior wall perfusion defect resolving on prone   imaging compatible with breast attenuation artifact.  No significant fixed or   reversible perfusion defect. Cath.   Mild coronary disease affecting the mid LAD as well as mid circumflex     coronary artery and also mid proximal and mid right coronary artery with     an 80% ostial stenosis in the 2nd diagonal artery which is similar to what     was reported in 2015    Labs:     CBC:   Recent Labs     12/20/21  2203 12/21/21  0720   WBC 10.1 14.1* HGB 11.0* 11.4*   HCT 33.2* 34.9*    222     BMP:   Recent Labs     12/20/21 2203 12/21/21  0720    140   K 3.2* 3.7   CO2 23 23   BUN 18 16   CREATININE 0.97* 0.93*   LABGLOM 56* 58*   GLUCOSE 116* 117*     BNP: No results for input(s): BNP in the last 72 hours. PT/INR: No results for input(s): PROTIME, INR in the last 72 hours. APTT:No results for input(s): APTT in the last 72 hours. CARDIAC ENZYMES:No results for input(s): CKTOTAL, CKMB, CKMBINDEX, TROPONINI in the last 72 hours. FASTING LIPID PANEL:  Lab Results   Component Value Date    HDL 55 01/03/2021    TRIG 92 01/03/2021     LIVER PROFILE:  Recent Labs     12/20/21 2203   AST 86*   ALT 48*   LABALBU 4.5         IMPRESSION:    Patient Active Problem List   Diagnosis    Essential hypertension    Fatigue    Acquired hypothyroidism    Right foot drop    NSVT (nonsustained ventricular tachycardia) (HCC)    Sigmoid diverticulosis    Primary osteoarthritis involving multiple joints    Mixed hyperlipidemia    Coronary artery disease involving native coronary artery of native heart with angina pectoris (HCC)    Esophageal reflux    Elevated liver enzymes    Chest pain    Troponin level elevated    Transaminasemia    Anemia, normocytic normochromic    Action tremor    Bilateral hearing loss    Gait disturbance    Lumbar facet joint syndrome    NSTEMI (non-ST elevated myocardial infarction) [address] [address]    Hypoxia       RECOMMENDATIONS:  1. nstemi  2. Cad   3. Hypoxia  Patient has significant history of cad. I will manage her as nstemi due to symptoms and abnormal troponin. Start full dose lovenox. Start brilinta 90mg po bid with aspirin for anticoagulation  Obtain echocardiogram  Further management depends on how she responds to initial medical therapy. Discussed with patient and nursing.     Tal Birch MD, MD  [facility] cardiology

## 2021-12-21 NOTE — PROGRESS NOTES
Patient left the unit with a disposition of home via black cab on 12/21/21 . Patient cited her  needed her as reason. Advised patient to follow up with a primary care physician or return to the Emergency Department if symptoms worsen. Explained the AMA process and risks. She verbalized understanding. Patient advised to wear mask until covid testing results and to follow up with primary MD and cardiologist. Patient verbalized understanding.    Francis Peterson RN

## 2021-12-22 ENCOUNTER — CARE COORDINATION (OUTPATIENT)
Dept: CASE MANAGEMENT | Age: 76
End: 2021-12-22

## 2021-12-22 LAB
EKG ATRIAL RATE: 47 BPM
EKG ATRIAL RATE: 51 BPM
EKG P AXIS: 72 DEGREES
EKG P AXIS: 80 DEGREES
EKG P-R INTERVAL: 128 MS
EKG P-R INTERVAL: 132 MS
EKG Q-T INTERVAL: 476 MS
EKG Q-T INTERVAL: 524 MS
EKG QRS DURATION: 84 MS
EKG QRS DURATION: 86 MS
EKG QTC CALCULATION (BAZETT): 438 MS
EKG QTC CALCULATION (BAZETT): 463 MS
EKG R AXIS: 42 DEGREES
EKG R AXIS: 43 DEGREES
EKG T AXIS: 82 DEGREES
EKG T AXIS: 86 DEGREES
EKG VENTRICULAR RATE: 47 BPM
EKG VENTRICULAR RATE: 51 BPM

## 2021-12-22 NOTE — CARE COORDINATION
well at home?: Yes  Care Transitions Interventions         Follow Up  Future Appointments   Date Time Provider Jovany Chandler   4/5/2022  1:00 PM Regla Rocha MD [facility] Jayant Brown RN

## 2021-12-28 NOTE — ADT AUTH CERT
Myocardial Infarction - Care Day 1 (12/21/2021) by Susy Flanagan RN       Review Status Review Entered   Completed 12/27/2021 15:10      Criteria Review      Care Day: 1 Care Date: 12/21/2021 Level of Care:    Guideline Day 1    Level Of Care    (X) ICU or intermediate care after emergency treatment    12/27/2021 3:03 PM EST by Tim      M/S    Clinical Status    (X) * Clinical Indications met    Routes    ( ) Parenteral and oral medications    12/27/2021 3:03 PM EST by Tim      Maalox 30 ml x1 in ED, IV zofran 4 mg x1    ( ) Oral hydration    12/27/2021 3:03 PM EST by Tim      IVF bolus 1000 ml x1    Interventions    (X) ECG, cardiac biomarkers    12/27/2021 3:10 PM EST by Tim      troponin HS 27..26..66    12/27/2021 3:03 PM [facility]      troponin HS 66    EKG-Sinus bradycardia  Possible Left atrial enlargement  Nonspecific ST and T wave abnormality  Abnormal ECG  No previous ECGs available    (X) CXR    [facility]2021 [facility]      CXR-Mild pulmonary vascular congestive change.  Cardiomegaly. No significant pleural effusion    (X) Oxygen    12/27/2021 3:03 PM EST by Tim      88% on RA --placed on 2 liter n/c    Medications    (X) Possible IV morphine    12/27/2021 3:03 PM EST by [facility]      IV morphine 2 mg x1    (X) Antiplatelet agents (eg, aspirin, clopidogrel, ticagrelor, IV cangrelor)    12/27/2021 3:03 PM EST by Tim      asa 324 mg x1 in ED    (X) Possible nitroglycerin    12/27/2021 3:03 PM EST by Melinda Ayoub 0.4 mg x1    * Milestone   Additional Notes   Presented to the ED 12/20 @ 2116--Inpatient 12/21 @ 650      56-year-old female presents emergency room for epigastric chest discomfort that started this afternoon around 1 PM. Patient states that she is concerned about heart attack. She has had previous MI and reports similar symptoms.  Patient does not have any stent she states that when she had a cath they told her that the stent could not be placed. She is on medical management. Cardiologist is Dr. Nerissa Aguilera. She has some associated nausea. Pain is a dull pain in the epigastric region that moves down into the stomach. Pain has been constant in nature. VS:97.9-68-/40      Glucose 116   Creat 0.97   K+3.2   Hgb 11.0, hct 33.2   Lipase 78   Troponin 27..26..66. .   Wbc 14.1   Mag 1.3       Physical Exam   Constitutional:        General: She is not in acute distress.      Appearance: She is well-developed. HENT: Head: Normocephalic. Eyes: Pupils: Pupils are equal, round, and reactive to light. Cardiovascular: Rate and Rhythm: Normal rate and regular rhythm.       Heart sounds: Normal heart sounds. Pulmonary: Effort: Pulmonary effort is normal. No respiratory distress.       Breath sounds: Normal breath sounds. Abdominal: General: Bowel sounds are normal.       Palpations: Abdomen is soft.       Tenderness: There is no abdominal tenderness. Musculoskeletal:          General: Normal range of motion. Skin: General: Skin is warm and dry. Neurological:  Mental Status: She is alert and oriented to person, place, and time. H&P       77 y.o. Non- / non  female who presents with Chest Pain (started 1pm today midsternal radiating to under right breast )    and is admitted to the hospital for the management of Hypoxia.       She initially presented to the emergency room with complaints of epigastric pain that she describes as heartburn started at 1 PM yesterday.  Mild nausea with the chest discomfort but denies vomiting.           Hypoxia    Essential hypertension    Acquired hypothyroidism    Mixed hyperlipidemia    Coronary artery disease involving native coronary artery of native heart with angina pectoris             Plan: Patient status inpatient in the  [facility]       1.  Inpatient admission on telemetry 2. Droplet precautions while awaiting Covid work-up   3. Trend troponin.  Initially was 27 followed by 26.   4. Due to her cardiac history she will likely need a stress test   5. Supplemental oxygen as needed   6. Check TSH.  Continue levothyroxine   7. Check lipids.  Continue atorvastatin   8. To do additional home medications   9. Monitor vital signs   10. Follow chemistry   11.  DVT prophylaxis.  Lovenox       Consultations: IP CONSULT TO INTERNAL MEDICINE           Myocardial Infarction - Clinical Indications for Admission to Inpatient Care by Renetta Bauer RN       Review Status Review Entered   Completed 12/27/2021 15:00      Criteria Review      Clinical Indications for Admission to Inpatient Care    Most Recent : Thomas Buck Most Recent Date: 12/27/2021 3:00 PM EST    (X) Admission is indicated for  1 or more  of the following  (1) (2) (3) (4) (5) (6) (7) (8) (9):       (X) Acute myocardial infarction (MI) [A] (not in context of cardiac procedure within last 48 hours),       as indicated by  ALL  of the following  (11):          (X) Elevated cardiac troponin level, as indicated by  1 or more  of the following :             (X) New or presumed new elevation of cardiac troponin level (ie, elevation clearly not due to             chronic condition, see footnotes) [B] [C] [D]             12/27/2021 3:00 PM EST by Thomas Buck               51          (X) Myocardial injury due to acute ischemia, as indicated by  1 or more  of the following :             (X) New or presumed new ECG changes consistent with ischemia (eg, ST-segment change, left bundle             branch block)             12/27/2021 3:00 PM EST by Thomas Buck               Sinus bradycardia  Possible Left atrial enlargement  Nonspecific ST and T wave abnormality  Abnormal ECG  No previous ECGs available

## 2021-12-29 ENCOUNTER — CARE COORDINATION (OUTPATIENT)
Dept: CASE MANAGEMENT | Age: 76
End: 2021-12-29

## 2022-01-06 ENCOUNTER — CARE COORDINATION (OUTPATIENT)
Dept: CASE MANAGEMENT | Age: 77
End: 2022-01-06

## 2022-01-06 NOTE — CARE COORDINATION
[facility] Transitions Follow Up Call- final call     2022    Patient: Roselyn Maya  Patient : 1945   MRN: [id number]  Reason for Admission: chest pain (pt left AMA to go home and take care of bedridden )  Discharge Date: 21 RARS: Readmission Risk Score: 9 ( )         Spoke with: Anne Irby    Call to pt who states she is doing fine  Denies any further chest pain or shortness of breath. States understanding to call 911 or return to the hospital if the chest pain returns. States her  is bedridden now- she has his daughter that can help him if she needs to return to hospital   She has not scheduled with Dr Roselyn Costello (cardioloy) yet  Denies needs  Writer informs this is final ([facility]) phone call- v/u. Encouraged call writer/ [facility] or providers if questions or concerns- v/u. Episode closed      Care Transitions Subsequent and Final Call    Subsequent and Final Calls  Do you have any ongoing symptoms?: No  Have your medications changed?: No  Do you have any questions related to your medications?: No  Do you currently have any active services?: No  Do you have any needs or concerns that I can assist you with?: No  Identified Barriers: Lack of Education  Care Transitions Interventions  Other Interventions:            Follow Up  Future Appointments   Date Time Provider Jovany Chandler   2022  1:00 PM Adri Truong MD [facility] Bell Baker RN

## 2022-01-08 DIAGNOSIS — M15.9 PRIMARY OSTEOARTHRITIS INVOLVING MULTIPLE JOINTS: ICD-10-CM

## 2022-01-08 DIAGNOSIS — M51.36 DDD (DEGENERATIVE DISC DISEASE), LUMBAR: ICD-10-CM

## 2022-01-10 RX ORDER — GABAPENTIN 600 MG/1
TABLET ORAL
Qty: 270 TABLET | Refills: 1 | Status: SHIPPED | OUTPATIENT
Start: 2022-01-10 | End: 2022-04-05 | Stop reason: ALTCHOICE

## 2022-01-10 NOTE — TELEPHONE ENCOUNTER
Pharm requested    Health Maintenance   Topic Date Due    Annual Wellness Visit (AWV)  Never done    DTaP/Tdap/Td vaccine (2 - Td or Tdap) 04/04/2021    COVID-19 Vaccine (3 - Booster for Moderna series) 09/03/2021    Lipid screen  01/03/2022    Shingles Vaccine (1 of 2) 10/10/2024 (Originally 3/21/1993)    Depression Screen  01/29/2022    TSH testing  12/21/2022    Potassium monitoring  12/21/2022    Creatinine monitoring  12/21/2022    DEXA (modify frequency per FRAX score)  Completed    Flu vaccine  Completed    Pneumococcal 65+ years Vaccine  Completed    Hepatitis C screen  Completed    Hepatitis A vaccine  Aged Out    Hepatitis B vaccine  Aged Out    Hib vaccine  Aged Out    Meningococcal (ACWY) vaccine  Aged Out             (applicable per patient's age: Cancer Screenings, Depression Screening, Fall Risk Screening, Immunizations)    LDL Cholesterol (mg/dL)   Date Value   01/03/2021 65     AST (U/L)   Date Value   12/20/2021 86 (H)     ALT (U/L)   Date Value   12/20/2021 48 (H)     BUN (mg/dL)   Date Value   12/21/2021 16      (goal A1C is < 7)   (goal LDL is <100) need 30-50% reduction from baseline     BP Readings from Last 3 Encounters:   12/21/21 (!) 112/47   10/04/21 130/60   05/25/21 (!) 118/51    (goal /80)      All Future Testing planned in [facility]:  Lab Frequency Next Occurrence       Next Visit Date:  Future Appointments   Date Time Provider Jovany Chandler   4/5/2022  1:00 PM MD [facility] KARY Gupta            Patient Active Problem List:     Essential hypertension     Fatigue     Acquired hypothyroidism     Right foot drop     NSVT (nonsustained ventricular tachycardia) (HCC)     Sigmoid diverticulosis     Primary osteoarthritis involving multiple joints     Mixed hyperlipidemia     Coronary artery disease involving native coronary artery of native heart with angina pectoris (HCC)     Esophageal reflux     Elevated liver enzymes     Chest pain     Troponin level elevated     Transaminasemia     Anemia, normocytic normochromic     Action tremor     Bilateral hearing loss     Gait disturbance     Lumbar facet joint syndrome     NSTEMI (non-ST elevated myocardial infarction) ([facility] [address]     Hypoxia

## 2022-01-15 DIAGNOSIS — F51.01 PRIMARY INSOMNIA: ICD-10-CM

## 2022-01-17 RX ORDER — TRAZODONE HYDROCHLORIDE 50 MG/1
TABLET ORAL
Qty: 60 TABLET | Refills: 5 | Status: SHIPPED | OUTPATIENT
Start: 2022-01-17

## 2022-01-17 NOTE — TELEPHONE ENCOUNTER
Pharm requested    Health Maintenance   Topic Date Due    Annual Wellness Visit (AWV)  Never done    DTaP/Tdap/Td vaccine (2 - Td or Tdap) 04/04/2021    COVID-19 Vaccine (3 - Booster for Moderna series) 09/03/2021    Lipid screen  01/03/2022    Depression Screen  01/29/2022    Shingles Vaccine (1 of 2) 10/10/2024 (Originally 3/21/1993)    TSH testing  12/21/2022    Potassium monitoring  12/21/2022    Creatinine monitoring  12/21/2022    DEXA (modify frequency per FRAX score)  Completed    Flu vaccine  Completed    Pneumococcal 65+ years Vaccine  Completed    Hepatitis C screen  Completed    Hepatitis A vaccine  Aged Out    Hepatitis B vaccine  Aged Out    Hib vaccine  Aged Out    Meningococcal (ACWY) vaccine  Aged Out             (applicable per patient's age: Cancer Screenings, Depression Screening, Fall Risk Screening, Immunizations)    LDL Cholesterol (mg/dL)   Date Value   01/03/2021 65     AST (U/L)   Date Value   12/20/2021 86 (H)     ALT (U/L)   Date Value   12/20/2021 48 (H)     BUN (mg/dL)   Date Value   12/21/2021 16      (goal A1C is < 7)   (goal LDL is <100) need 30-50% reduction from baseline     BP Readings from Last 3 Encounters:   12/21/21 (!) 112/47   10/04/21 130/60   05/25/21 (!) 118/51    (goal /80)      All Future Testing planned in [facility]:  Lab Frequency Next Occurrence       Next Visit Date:  Future Appointments   Date Time Provider Jovany Chandler   4/5/2022  1:00 PM [facility]            Patient Active Problem List:     Essential hypertension     Fatigue     Acquired hypothyroidism     Right foot drop     NSVT (nonsustained ventricular tachycardia) (HCC)     Sigmoid diverticulosis     Primary osteoarthritis involving multiple joints     Mixed hyperlipidemia     Coronary artery disease involving native coronary artery of native heart with angina pectoris (HCC)     Esophageal reflux     Elevated liver enzymes     Chest pain     Troponin level elevated     Transaminasemia     Anemia, normocytic normochromic     Action tremor     Bilateral hearing loss     Gait disturbance     Lumbar facet joint syndrome     NSTEMI (non-ST elevated myocardial infarction) ([facility] [address]     Hypoxia

## 2022-01-20 DIAGNOSIS — M51.36 DDD (DEGENERATIVE DISC DISEASE), LUMBAR: ICD-10-CM

## 2022-01-20 DIAGNOSIS — M15.9 PRIMARY OSTEOARTHRITIS INVOLVING MULTIPLE JOINTS: Primary | ICD-10-CM

## 2022-01-20 RX ORDER — HYDROCODONE BITARTRATE AND ACETAMINOPHEN 5; 325 MG/1; MG/1
1 TABLET ORAL EVERY 4 HOURS PRN
Qty: 30 TABLET | Refills: 0 | Status: SHIPPED | OUTPATIENT
Start: 2022-01-20 | End: 2022-02-10 | Stop reason: SDUPTHER

## 2022-01-20 NOTE — TELEPHONE ENCOUNTER
Fransisco Tracy is calling to request a refill on the following medication(s):    Last Visit Date (If Applicable):  95/6/2838    Next Visit Date:    4/5/2022    Medication Request:  Requested Prescriptions     Pending Prescriptions Disp Refills    HYDROcodone-acetaminophen (NORCO) 5-325 MG per tablet 18 tablet 0     Sig: Take 1 tablet by mouth every 4 hours as needed for Pain for up to 3 days. Intended supply: 3 days.  Take lowest dose possible to manage pain

## 2022-02-09 RX ORDER — OMEPRAZOLE 40 MG/1
CAPSULE, DELAYED RELEASE ORAL
Qty: 90 CAPSULE | Refills: 0 | Status: SHIPPED
Start: 2022-02-09 | End: 2022-04-05 | Stop reason: ALTCHOICE

## 2022-02-10 DIAGNOSIS — M15.9 PRIMARY OSTEOARTHRITIS INVOLVING MULTIPLE JOINTS: ICD-10-CM

## 2022-02-10 DIAGNOSIS — M51.36 DDD (DEGENERATIVE DISC DISEASE), LUMBAR: ICD-10-CM

## 2022-02-10 RX ORDER — HYDROCODONE BITARTRATE AND ACETAMINOPHEN 5; 325 MG/1; MG/1
1 TABLET ORAL EVERY 4 HOURS PRN
Qty: 30 TABLET | Refills: 0 | Status: SHIPPED | OUTPATIENT
Start: 2022-02-10 | End: 2022-03-02 | Stop reason: SDUPTHER

## 2022-02-10 NOTE — TELEPHONE ENCOUNTER
Roselyn Maya is calling to request a refill on the following medication(s):    Last Visit Date (If Applicable):  10/2/3131    Next Visit Date:    4/5/2022    Medication Request:  Requested Prescriptions     Pending Prescriptions Disp Refills    HYDROcodone-acetaminophen (NORCO) 5-325 MG per tablet 30 tablet 0     Sig: Take 1 tablet by mouth every 4 hours as needed for Pain for up to 10 days.

## 2022-02-25 ENCOUNTER — TELEPHONE (OUTPATIENT)
Dept: FAMILY MEDICINE CLINIC | Age: 77
End: 2022-02-25

## 2022-02-25 NOTE — TELEPHONE ENCOUNTER
If this has been going on for a couple of months and does not seem acutely worse then she needs to be seen in person to discuss this further. She has an appointment with Dr. Pavel Echeverria in April.   Is there any way to get her in sooner for a sick call with Dr. Pavel Echeverria or myself

## 2022-02-25 NOTE — TELEPHONE ENCOUNTER
Pt called in stating that she is very fatigue and she does not have a appetite she also mentioned that she is also having a hard time writing. I did advise pt to go to the hospital because of her sx she state she does not want to go to the hospital because she also have to take care of her . She stated that thsi has been going on for a couple of months but she does not really no about going to the hospital just yet.     Please advise

## 2022-03-02 DIAGNOSIS — M51.36 DDD (DEGENERATIVE DISC DISEASE), LUMBAR: ICD-10-CM

## 2022-03-02 DIAGNOSIS — M15.9 PRIMARY OSTEOARTHRITIS INVOLVING MULTIPLE JOINTS: ICD-10-CM

## 2022-03-02 RX ORDER — HYDROCODONE BITARTRATE AND ACETAMINOPHEN 5; 325 MG/1; MG/1
1 TABLET ORAL EVERY 4 HOURS PRN
Qty: 30 TABLET | Refills: 0 | Status: SHIPPED | OUTPATIENT
Start: 2022-03-02 | End: 2022-03-21 | Stop reason: SDUPTHER

## 2022-03-02 NOTE — TELEPHONE ENCOUNTER
[facility] Last is calling to request a refill on the following medication(s):    Last Visit Date (If Applicable):  87/1/7590    Next Visit Date:    4/5/2022    Medication Request:  Requested Prescriptions     Pending Prescriptions Disp Refills    HYDROcodone-acetaminophen (NORCO) 5-325 MG per tablet 30 tablet 0     Sig: Take 1 tablet by mouth every 4 hours as needed for Pain for up to 10 days.

## 2022-03-17 NOTE — TELEPHONE ENCOUNTER
----- Message from Devon Giles sent at 3/17/2022 12:49 PM EDT -----  Subject: Message to Provider    QUESTIONS  Information for Provider? Pt called in to ask if a nurse or Jose Antonio Mijares, could call her back. She was seen this morning by her heart doctor   and her heart doctor told her to reach out to her PCP in regards to her   Levothyroxine.  ---------------------------------------------------------------------------  --------------  CALL BACK INFO  What is the best way for the office to contact you? OK to leave message on   voicemail  Preferred Call Back Phone Number? [phone]  ---------------------------------------------------------------------------  --------------  SCRIPT ANSWERS  Relationship to Patient?  Self

## 2022-03-17 NOTE — TELEPHONE ENCOUNTER
Pt stated she stop taking her levothyroxine because it was expensive is asking if there is a cheaper medication please advise as pt stated her cardiology dr asked her to call pcp for thyroid medication

## 2022-03-18 NOTE — TELEPHONE ENCOUNTER
This is a generic medication. She needs to ask her pharmacist if they are giving her generic. Does she need a new rx sent?  If so, please pend

## 2022-03-21 DIAGNOSIS — M15.9 PRIMARY OSTEOARTHRITIS INVOLVING MULTIPLE JOINTS: ICD-10-CM

## 2022-03-21 DIAGNOSIS — M51.36 DDD (DEGENERATIVE DISC DISEASE), LUMBAR: ICD-10-CM

## 2022-03-21 RX ORDER — HYDROCODONE BITARTRATE AND ACETAMINOPHEN 5; 325 MG/1; MG/1
1 TABLET ORAL EVERY 4 HOURS PRN
Qty: 30 TABLET | Refills: 0 | Status: SHIPPED | OUTPATIENT
Start: 2022-03-21 | End: 2022-04-05 | Stop reason: SDUPTHER

## 2022-03-21 NOTE — TELEPHONE ENCOUNTER
Vena Last is calling to request a refill on the following medication(s):    Last Visit Date (If Applicable):  89/8/4719    Next Visit Date:    4/5/2022    Medication Request:  Requested Prescriptions     Pending Prescriptions Disp Refills    HYDROcodone-acetaminophen (NORCO) 5-325 MG per tablet 30 tablet 0     Sig: Take 1 tablet by mouth every 4 hours as needed for Pain for up to 10 days.

## 2022-03-24 DIAGNOSIS — M51.36 DDD (DEGENERATIVE DISC DISEASE), LUMBAR: ICD-10-CM

## 2022-03-24 DIAGNOSIS — M15.9 PRIMARY OSTEOARTHRITIS INVOLVING MULTIPLE JOINTS: ICD-10-CM

## 2022-03-24 RX ORDER — HYDROCODONE BITARTRATE AND ACETAMINOPHEN 5; 325 MG/1; MG/1
1 TABLET ORAL EVERY 4 HOURS PRN
Qty: 30 TABLET | Refills: 0 | OUTPATIENT
Start: 2022-03-24 | End: 2022-04-03

## 2022-03-24 NOTE — TELEPHONE ENCOUNTER
----- Message from Radha Raymond sent at 3/24/2022  1:01 PM EDT -----  Subject: Refill Request    QUESTIONS  Name of Medication? HYDROcodone-acetaminophen (NORCO) 5-325 MG per tablet  Patient-reported dosage and instructions? As needed. once or twice a day  How many days do you have left? 0  Preferred Pharmacy? [facility]  Pharmacy phone number (if available)? [phone]  ---------------------------------------------------------------------------  --------------  CALL BACK INFO  What is the best way for the office to contact you? OK to leave message on   voicemail  Preferred Call Back Phone Number?  [phone]

## 2022-04-01 ENCOUNTER — APPOINTMENT (OUTPATIENT)
Dept: GENERAL RADIOLOGY | Age: 77
End: 2022-04-01
Payer: COMMERCIAL

## 2022-04-01 ENCOUNTER — HOSPITAL ENCOUNTER (EMERGENCY)
Age: 77
Discharge: HOME | End: 2022-04-01
Attending: EMERGENCY MEDICINE
Payer: COMMERCIAL

## 2022-04-01 VITALS
DIASTOLIC BLOOD PRESSURE: 66 MMHG | SYSTOLIC BLOOD PRESSURE: 130 MMHG | WEIGHT: 110 LBS | BODY MASS INDEX: 21.6 KG/M2 | HEART RATE: 71 BPM | HEIGHT: 60 IN | TEMPERATURE: 98.1 F | OXYGEN SATURATION: 92 % | RESPIRATION RATE: 19 BRPM

## 2022-04-01 DIAGNOSIS — R07.89 ATYPICAL CHEST PAIN: Primary | ICD-10-CM

## 2022-04-01 LAB
ABSOLUTE EOS #: 0.09 K/UL (ref 0–0.44)
ABSOLUTE IMMATURE GRANULOCYTE: 0.03 K/UL (ref 0–0.3)
ABSOLUTE LYMPH #: 1.47 K/UL (ref 1.1–3.7)
ABSOLUTE MONO #: 0.46 K/UL (ref 0.1–1.2)
ANION GAP SERPL CALCULATED.3IONS-SCNC: 13 MMOL/L (ref 9–17)
BASOPHILS # BLD: 1 % (ref 0–2)
BASOPHILS ABSOLUTE: 0.05 K/UL (ref 0–0.2)
BUN BLDV-MCNC: 16 MG/DL (ref 8–23)
BUN/CREAT BLD: 24 (ref 9–20)
CALCIUM SERPL-MCNC: 9.3 MG/DL (ref 8.6–10.4)
CHLORIDE BLD-SCNC: 102 MMOL/L (ref 98–107)
CO2: 22 MMOL/L (ref 20–31)
CREAT SERPL-MCNC: 0.67 MG/DL (ref 0.5–0.9)
EOSINOPHILS RELATIVE PERCENT: 1 % (ref 1–4)
GFR AFRICAN AMERICAN: >60 ML/MIN
GFR NON-AFRICAN AMERICAN: >60 ML/MIN
GFR SERPL CREATININE-BSD FRML MDRD: ABNORMAL ML/MIN/{1.73_M2}
GLUCOSE BLD-MCNC: 113 MG/DL (ref 70–99)
HCT VFR BLD CALC: 34.7 % (ref 36.3–47.1)
HEMOGLOBIN: 11.7 G/DL (ref 11.9–15.1)
IMMATURE GRANULOCYTES: 0 %
LYMPHOCYTES # BLD: 17 % (ref 24–43)
MAGNESIUM: 1.4 MG/DL (ref 1.6–2.6)
MCH RBC QN AUTO: 33.3 PG (ref 25.2–33.5)
MCHC RBC AUTO-ENTMCNC: 33.7 G/DL (ref 28.4–34.8)
MCV RBC AUTO: 98.9 FL (ref 82.6–102.9)
MONOCYTES # BLD: 6 % (ref 3–12)
NRBC AUTOMATED: 0 PER 100 WBC
PDW BLD-RTO: 12.9 % (ref 11.8–14.4)
PLATELET # BLD: 275 K/UL (ref 138–453)
PMV BLD AUTO: 9.5 FL (ref 8.1–13.5)
POTASSIUM SERPL-SCNC: 3.5 MMOL/L (ref 3.7–5.3)
RBC # BLD: 3.51 M/UL (ref 3.95–5.11)
SEG NEUTROPHILS: 75 % (ref 36–65)
SEGMENTED NEUTROPHILS ABSOLUTE COUNT: 6.34 K/UL (ref 1.5–8.1)
SODIUM BLD-SCNC: 137 MMOL/L (ref 135–144)
TROPONIN, HIGH SENSITIVITY: 21 NG/L (ref 0–14)
TROPONIN, HIGH SENSITIVITY: 23 NG/L (ref 0–14)
WBC # BLD: 8.4 K/UL (ref 3.5–11.3)

## 2022-04-01 PROCEDURE — 84484 ASSAY OF TROPONIN QUANT: CPT

## 2022-04-01 PROCEDURE — 83735 ASSAY OF MAGNESIUM: CPT

## 2022-04-01 PROCEDURE — 93005 ELECTROCARDIOGRAM TRACING: CPT | Performed by: EMERGENCY MEDICINE

## 2022-04-01 PROCEDURE — 85025 COMPLETE CBC W/AUTO DIFF WBC: CPT

## 2022-04-01 PROCEDURE — 71046 X-RAY EXAM CHEST 2 VIEWS: CPT

## 2022-04-01 PROCEDURE — 99283 EMERGENCY DEPT VISIT LOW MDM: CPT

## 2022-04-01 PROCEDURE — 80048 BASIC METABOLIC PNL TOTAL CA: CPT

## 2022-04-01 ASSESSMENT — ENCOUNTER SYMPTOMS
ABDOMINAL PAIN: 1
NAUSEA: 1
SINUS PAIN: 0
ABDOMINAL DISTENTION: 0
VOMITING: 0
COLOR CHANGE: 0
APNEA: 0
DIARRHEA: 0
CONSTIPATION: 0
SHORTNESS OF BREATH: 1
CHEST TIGHTNESS: 0
EYES NEGATIVE: 1

## 2022-04-01 ASSESSMENT — PAIN SCALES - GENERAL: PAINLEVEL_OUTOF10: 10

## 2022-04-01 NOTE — ED PROVIDER NOTES
EMERGENCY DEPARTMENT ENCOUNTER    Pt Name: Trey Sanchez  MRN: [id number]  [place] 1945  Date of evaluation: 4/1/22  CHIEF COMPLAINT       Chief Complaint   Patient presents with    Chest Pain    Back Pain     HISTORY OF PRESENT ILLNESS   42-year-old female presents emergency room for which she reports as heartburn pain. She reports around 11 AM this morning she started feeling a sharp pain in her epigastric area with nausea. She reports a similar feeling back in December when she was here in the hospital and was admitted for elevated cardiac enzymes. Patient ended up leaving [address] but reports she has not had any issues since then. She is not having any pain at this time. REVIEW OF SYSTEMS     Review of Systems   Constitutional: Negative for activity change, chills and diaphoresis. HENT: Negative for congestion, sinus pain and tinnitus. Eyes: Negative. Respiratory: Positive for shortness of breath. Negative for apnea and chest tightness. Gastrointestinal: Positive for abdominal pain and nausea. Negative for abdominal distention, constipation, diarrhea and vomiting. Genitourinary: Negative for difficulty urinating and frequency. Musculoskeletal: Negative for arthralgias and myalgias. Skin: Negative for color change and rash. Neurological: Negative for dizziness. Hematological: Negative. Psychiatric/Behavioral: Negative.         PASTMEDICAL HISTORY     Past Medical History:   Diagnosis Date    Arthritis     CAD (coronary artery disease)     Elevated liver enzymes     Esophageal reflux     Foot drop, right foot     Heart attack [facility])     patient does not know when    History of Clostridium difficile infection     treated on 08/12/17    HL (hearing loss)     Hyperlipidemia     Hypertension     Hypothyroidism     Lumbar radiculopathy     Osteoarthritis     Osteopenia     Psychiatric problem     Tremor     hand     Past Problem List  Patient Active Problem List   Diagnosis Code    Essential hypertension I10    Fatigue R53.83    Acquired hypothyroidism E03.9    Right foot drop M21.371    NSVT (nonsustained ventricular tachycardia) ([facility]) I47.2    Sigmoid diverticulosis K57.30    Primary osteoarthritis involving multiple joints M89.49    Mixed hyperlipidemia E78.2    Coronary artery disease involving native coronary artery of native heart with angina pectoris ([facility] [address] I25.119    Esophageal reflux K21.9    Elevated liver enzymes R74.8    Chest pain R07.9    Troponin level elevated R77.8    Transaminasemia R74.01    Anemia, normocytic normochromic D64.9    Action tremor G25.2    Bilateral hearing loss H91.93    Gait disturbance R26.9    Lumbar facet joint syndrome M47.816    NSTEMI (non-ST elevated myocardial infarction) ([facility] [address] I21.4    Hypoxia R09.02     SURGICAL HISTORY       Past Surgical History:   Procedure Laterality Date    BLADDER SUSPENSION      CARDIAC CATHETERIZATION  01/2015, 9/2017    COLONOSCOPY  2012    COLONOSCOPY  08/04/2017    PSEUDOMEMBRANOUS COLITIS WITH MUCOSAL ISCHEMIA, C DIFF POS    DILATION AND CURETTAGE OF UTERUS      JOINT REPLACEMENT Right 1266    hip-complicated with rt drop foot-Dr. Borja Liming JOINT REPLACEMENT Left 2017    hip-Dr. Darcy Jade    OR COLONOSCOPY W/BIOPSY SINGLE/MULTIPLE N/A 8/4/2017    COLONOSCOPY WITH BIOPSY performed by Onofre Cavazos MD at [address] [place] [address] EGD TRANSORAL BIOPSY SINGLE/MULTIPLE N/A 8/4/2017    EGD BIOPSY performed by Onofre Cavazos MD at [address] [place] [address] Left 03/07/2017    UPPER GASTROINTESTINAL ENDOSCOPY  08/04/2017    MILD CHRONIC GASTRITIS      CURRENT MEDICATIONS       Current Discharge Medication List      CONTINUE these medications which have NOT CHANGED    Details   omeprazole (PRILOSEC) 40 MG delayed release capsule TAKE ONE CAPSULE BY MOUTH DAILY  Qty: 90 capsule, Refills: 0      traZODone (DESYREL) 50 MG tablet TAKE ONE TABLET BY MOUTH ONCE NIGHTLY  Qty: 60 tablet, Refills: 5    Associated Diagnoses: Primary insomnia      gabapentin (NEURONTIN) 600 MG tablet TAKE ONE TABLET BY MOUTH THREE TIMES A DAY  Qty: 270 tablet, Refills: 1    Associated Diagnoses: Primary osteoarthritis involving multiple joints; DDD (degenerative disc disease), lumbar      bumetanide (BUMEX) 0.5 MG tablet Take 1 tablet by mouth daily  Qty: 30 tablet, Refills: 2      atorvastatin (LIPITOR) 20 MG tablet Take 1 tablet by mouth daily  Qty: 90 tablet, Refills: 1    Associated Diagnoses: Mixed hyperlipidemia      tiZANidine (ZANAFLEX) 4 MG tablet TAKE ONE TABLET BY MOUTH FOUR TIMES A DAY AS NEEDED FOR MUSCLE SPASMS  Qty: 40 tablet, Refills: 0    Associated Diagnoses: Neck pain      levothyroxine (SYNTHROID) 100 MCG tablet TAKE ONE TABLET BY MOUTH DAILY  Qty: 90 tablet, Refills: 3    Associated Diagnoses: Hypothyroidism, unspecified type      metoprolol succinate (TOPROL XL) 25 MG extended release tablet Take 25 mg by mouth daily      spironolactone (ALDACTONE) 25 MG tablet Take 25 mg by mouth daily      sacubitril-valsartan (ENTRESTO) 24-26 MG per tablet Take 1 tablet by mouth 2 times daily      nitroGLYCERIN (NITROSTAT) 0.4 MG SL tablet up to max of 3 total doses. If no relief after 1 dose, call 911. Qty: 25 tablet, Refills: 3      diclofenac sodium (VOLTAREN) 1 % GEL Apply 4 g topically every 4 hours as needed for Pain  Qty: 1 Tube, Refills: 5    Associated Diagnoses: Primary osteoarthritis involving multiple joints      atenolol (TENORMIN) 25 MG tablet TAKE ONE TABLET BY MOUTH DAILY  Qty: 90 tablet, Refills: 2      triamcinolone (KENALOG) 0.025 % cream Apply 1 applicator topically 2 times daily Indications: on face Apply Topically      aspirin 81 MG tablet Take 325 mg by mouth 2 times daily       Multiple Vitamins-Minerals (CENTRUM SILVER PO) Take 1 tablet by mouth daily           ALLERGIES     has No Known Allergies.   FAMILY HISTORY She indicated that her mother is . She indicated that her father is . She indicated that her sister is alive. She indicated that her brother is alive. She indicated that both of her sons are alive. SOCIAL HISTORY       Social History     Tobacco Use    Smoking status: Former Smoker     Packs/day: 0.00     Years: 30.00     Pack years: 0.00     Quit date: 2/10/2007     Years since quitting: 15.1    Smokeless tobacco: Never Used   Vaping Use    Vaping Use: Never used   Substance Use Topics    Alcohol use: Yes     Comment: occas    Drug use: No     PHYSICAL EXAM     INITIAL VITALS: /60   Pulse 70   Temp 98.1 °F (36.7 °C) (Oral)   Resp 19   Ht 5' (1.524 m)   Wt 110 lb (49.9 kg)   SpO2 95%   BMI 21.48 kg/m²    Physical Exam  Constitutional:       General: She is not in acute distress. Appearance: She is well-developed. HENT:      Head: Normocephalic. Eyes:      Pupils: Pupils are equal, round, and reactive to light. Cardiovascular:      Rate and Rhythm: Normal rate and regular rhythm. Heart sounds: Normal heart sounds. Pulmonary:      Effort: Pulmonary effort is normal. No respiratory distress. Breath sounds: Normal breath sounds. Abdominal:      General: Bowel sounds are normal.      Palpations: Abdomen is soft. Tenderness: There is no abdominal tenderness. Musculoskeletal:         General: Normal range of motion. Skin:     General: Skin is warm and dry. Neurological:      Mental Status: She is alert and oriented to person, place, and time. MEDICAL DECISION MAKIN-year-old female presenting to the emergency room for atypical chest pain that started around 11 AM this morning. Patient is pain-free here in the ED. Patient does not have any significant EKG changes. She is not in any distress here in the ED. Labs are within acceptable limits. Patient has nondiagnostic troponin.     ED Course as of 22 1648    [phone] Discussed with Dr. Carlos Grewal; plan at this time is for second EKG and troponin  [EG]      ED Course User Index  [EG] Keshav Robledo MD     49-year-old female presenting to the emergency room with episode of epigastric discomfort that was severe in nature starting around 11 PM and ending just prior to arrival.  Patient does not have any significant EKG changes from her first EKG. No change in cardiac enzymes. She is pain-free during her ED stay. Given that she has appropriate cardiac follow-up and does not want to stay we made combined decision that she can be discharged today. Patient given strict return precautions. CRITICAL CARE:       PROCEDURES:    Procedures    DIAGNOSTIC RESULTS   EKG:All EKG's are interpreted by the Emergency Department Physician who either signs or Co-signs this chart in the absence of a cardiologist.    EKG sinus rhythm ventricular rate 67   Nonspecific ST changes V4 V5  No T wave changes    QTc 467  Overall unchanged appearance from 12/20/2021    RADIOLOGY:All plain film, CT, MRI, and formal ultrasound images (except ED bedside ultrasound) are read by the radiologist, see reports below, unless otherwisenoted in MDM or here. XR CHEST (2 VW)   Final Result   Some hyperinflation. No focal consolidation. LABS: All lab results were reviewed by myself, and all abnormals are listed below.   Labs Reviewed   CBC WITH AUTO DIFFERENTIAL - Abnormal; Notable for the following components:       Result Value    RBC 3.51 (*)     Hemoglobin 11.7 (*)     Hematocrit 34.7 (*)     Seg Neutrophils 75 (*)     Lymphocytes 17 (*)     All other components within normal limits   BASIC METABOLIC PANEL W/ REFLEX TO MG FOR LOW K - Abnormal; Notable for the following components:    Glucose 113 (*)     Bun/Cre Ratio 24 (*)     Potassium 3.5 (*)     All other components within normal limits   TROPONIN - Abnormal; Notable for the following components:    Troponin, High Sensitivity 21 (*) All other components within normal limits   MAGNESIUM - Abnormal; Notable for the following components:    Magnesium 1.4 (*)     All other components within normal limits   TROPONIN - Abnormal; Notable for the following components:    Troponin, High Sensitivity 23 (*)     All other components within normal limits       EMERGENCY DEPARTMENTCOURSE:         Vitals:    Vitals:    04/01/22 1437 04/01/22 1447 04/01/22 1517 04/01/22 1547   BP: 133/76 132/65 (!) 120/54 135/60   Pulse: 74 71 67 70   Resp: 18 23 19 19   Temp:       TempSrc:       SpO2: 95% 95% 94% 95%   Weight:       Height:           The patient was given the following medications while in the emergency department:  No orders of the defined types were placed in this encounter. CONSULTS:  IP CONSULT TO CARDIOLOGY    FINAL IMPRESSION      1. Atypical chest pain          DISPOSITION/PLAN   DISPOSITION        PATIENT REFERRED TO:  Barry Varner MD  [address]  [phone]    Schedule an appointment as soon as possible for a visit   call Monday    DISCHARGE MEDICATIONS:  Current Discharge Medication List        Amanda Byrd MD  Attending Emergency Physician      Care during this encounter was due to an unprecedented national emergency due to COVID-19.            Clinton Buck MD  04/01/22 5862

## 2022-04-02 LAB
EKG ATRIAL RATE: 67 BPM
EKG ATRIAL RATE: 71 BPM
EKG P AXIS: 66 DEGREES
EKG P AXIS: 69 DEGREES
EKG P-R INTERVAL: 122 MS
EKG P-R INTERVAL: 124 MS
EKG Q-T INTERVAL: 420 MS
EKG Q-T INTERVAL: 438 MS
EKG QRS DURATION: 80 MS
EKG QRS DURATION: 80 MS
EKG QTC CALCULATION (BAZETT): 456 MS
EKG QTC CALCULATION (BAZETT): 462 MS
EKG R AXIS: 26 DEGREES
EKG R AXIS: 47 DEGREES
EKG T AXIS: 76 DEGREES
EKG T AXIS: 77 DEGREES
EKG VENTRICULAR RATE: 67 BPM
EKG VENTRICULAR RATE: 71 BPM

## 2022-04-03 DIAGNOSIS — M54.2 NECK PAIN: ICD-10-CM

## 2022-04-04 RX ORDER — TIZANIDINE 4 MG/1
TABLET ORAL
Qty: 40 TABLET | Refills: 0 | Status: SHIPPED | OUTPATIENT
Start: 2022-04-04

## 2022-04-05 ENCOUNTER — OFFICE VISIT (OUTPATIENT)
Dept: FAMILY MEDICINE CLINIC | Age: 77
End: 2022-04-05
Payer: COMMERCIAL

## 2022-04-05 VITALS
SYSTOLIC BLOOD PRESSURE: 137 MMHG | DIASTOLIC BLOOD PRESSURE: 76 MMHG | BODY MASS INDEX: 20.59 KG/M2 | HEART RATE: 73 BPM | OXYGEN SATURATION: 94 % | WEIGHT: 123.6 LBS | RESPIRATION RATE: 16 BRPM | HEIGHT: 65 IN

## 2022-04-05 DIAGNOSIS — I47.29 NSVT (NONSUSTAINED VENTRICULAR TACHYCARDIA): ICD-10-CM

## 2022-04-05 DIAGNOSIS — M15.9 PRIMARY OSTEOARTHRITIS INVOLVING MULTIPLE JOINTS: ICD-10-CM

## 2022-04-05 DIAGNOSIS — K21.9 GASTROESOPHAGEAL REFLUX DISEASE, UNSPECIFIED WHETHER ESOPHAGITIS PRESENT: Primary | ICD-10-CM

## 2022-04-05 DIAGNOSIS — E78.2 MIXED HYPERLIPIDEMIA: ICD-10-CM

## 2022-04-05 DIAGNOSIS — M51.36 DDD (DEGENERATIVE DISC DISEASE), LUMBAR: ICD-10-CM

## 2022-04-05 DIAGNOSIS — I50.32 CHRONIC DIASTOLIC CONGESTIVE HEART FAILURE (HCC): ICD-10-CM

## 2022-04-05 DIAGNOSIS — R25.1 TREMOR OF RIGHT HAND: ICD-10-CM

## 2022-04-05 DIAGNOSIS — I25.119 CORONARY ARTERY DISEASE INVOLVING NATIVE CORONARY ARTERY OF NATIVE HEART WITH ANGINA PECTORIS (HCC): ICD-10-CM

## 2022-04-05 DIAGNOSIS — R26.9 GAIT DISTURBANCE: ICD-10-CM

## 2022-04-05 PROCEDURE — 99214 OFFICE O/P EST MOD 30 MIN: CPT | Performed by: FAMILY MEDICINE

## 2022-04-05 RX ORDER — PANTOPRAZOLE SODIUM 40 MG/1
40 TABLET, DELAYED RELEASE ORAL
Qty: 90 TABLET | Refills: 1 | Status: SHIPPED | OUTPATIENT
Start: 2022-04-05 | End: 2022-09-28

## 2022-04-05 RX ORDER — PRIMIDONE 50 MG/1
50 TABLET ORAL 3 TIMES DAILY
Qty: 90 TABLET | Refills: 3 | Status: SHIPPED | OUTPATIENT
Start: 2022-04-05 | End: 2022-07-07

## 2022-04-05 RX ORDER — METOPROLOL SUCCINATE 50 MG/1
50 TABLET, EXTENDED RELEASE ORAL DAILY
Qty: 30 TABLET | Refills: 5 | Status: SHIPPED
Start: 2022-04-05

## 2022-04-05 RX ORDER — HYDROCODONE BITARTRATE AND ACETAMINOPHEN 5; 325 MG/1; MG/1
1 TABLET ORAL EVERY 8 HOURS PRN
Qty: 60 TABLET | Refills: 0 | Status: SHIPPED | OUTPATIENT
Start: 2022-04-05 | End: 2022-05-25 | Stop reason: SDUPTHER

## 2022-04-05 RX ORDER — ATORVASTATIN CALCIUM 40 MG/1
40 TABLET, FILM COATED ORAL DAILY
Qty: 30 TABLET | Refills: 5
Start: 2022-04-05

## 2022-04-05 RX ORDER — FAMOTIDINE 20 MG/1
20 TABLET, FILM COATED ORAL 2 TIMES DAILY
Qty: 60 TABLET | Refills: 3
Start: 2022-04-05

## 2022-04-05 RX ORDER — NITROGLYCERIN 0.4 MG/1
TABLET SUBLINGUAL
Qty: 25 TABLET | Refills: 3
Start: 2022-04-05

## 2022-04-05 ASSESSMENT — PATIENT HEALTH QUESTIONNAIRE - PHQ9
SUM OF ALL RESPONSES TO PHQ9 QUESTIONS 1 & 2: 0
SUM OF ALL RESPONSES TO PHQ QUESTIONS 1-9: 0
2. FEELING DOWN, DEPRESSED OR HOPELESS: 0
SUM OF ALL RESPONSES TO PHQ QUESTIONS 1-9: 0
SUM OF ALL RESPONSES TO PHQ QUESTIONS 1-9: 0
1. LITTLE INTEREST OR PLEASURE IN DOING THINGS: 0
SUM OF ALL RESPONSES TO PHQ QUESTIONS 1-9: 0

## 2022-04-05 ASSESSMENT — ENCOUNTER SYMPTOMS
COUGH: 0
BLOOD IN STOOL: 0
BACK PAIN: 1
ALLERGIC/IMMUNOLOGIC NEGATIVE: 1
DIARRHEA: 1
ABDOMINAL PAIN: 0
SHORTNESS OF BREATH: 0
EYES NEGATIVE: 1
CONSTIPATION: 0

## 2022-04-05 NOTE — PROGRESS NOTES
[address] [place] [address]  [address]  Dept: [phone]    4/5/2022    CHIEF COMPLAINT    Chief Complaint   Patient presents with    Chronic Pain    Heartburn       HPI    Mickey Lara is a 76 y.o. female who presents   Chief Complaint   Patient presents with    Chronic Pain    Heartburn   . Recheck after ED visit for extreme heartburn. She was concerned that it could have been cardiac. She left the ED before evaluation was completed. Pain has improved. She is taking omeprazole 40 mg daily and Pepcid as needed. She says the pain is not always associated with certain foods. Pain usually comes on after some type of meal.      Vitals:    04/05/22 1301   BP: 137/76   Pulse: 73   Resp: 16   SpO2: 94%   Weight: 123 lb 9.6 oz (56.1 kg)   Height: 5' 5\" (1.651 m)       REVIEW OF SYSTEMS    Review of Systems   Constitutional: Positive for fatigue. Negative for fever and unexpected weight change. HENT: Positive for hearing loss. Eyes: Negative. Respiratory: Negative for cough and shortness of breath. Cardiovascular: Negative for chest pain and leg swelling. Gastrointestinal: Positive for diarrhea (for 3 weeks, gone now). Negative for abdominal pain, blood in stool and constipation. Heartburn   Endocrine: Negative. Genitourinary: Negative for frequency and urgency. Musculoskeletal: Positive for arthralgias (hips, rt lower leg, low back), back pain and gait problem. Skin: Negative. Allergic/Immunologic: Negative. Neurological: Positive for tremors (rt hand, trouble writing). Negative for dizziness and headaches. Rt drop foot   Hematological: Negative. Psychiatric/Behavioral: Negative for sleep disturbance. The patient is not nervous/anxious.         PAST MEDICAL HISTORY    Past Medical History:   Diagnosis Date    Arthritis     CAD (coronary artery disease)     Elevated liver enzymes     Esophageal reflux     Foot drop, right foot     Heart attack ([facility] [address]     patient does not know when    History of Clostridium difficile infection     treated on 08/12/17    HL (hearing loss)     Hyperlipidemia     Hypertension     Hypothyroidism     Lumbar radiculopathy     Osteoarthritis     Osteopenia     Psychiatric problem     Tremor     hand       FAMILY HISTORY    Family History   Problem Relation Age of Onset    Heart Disease Mother     Heart Disease Father     Arthritis Father     Heart Disease Brother        SOCIAL HISTORY    Social History     Socioeconomic History    Marital status:      Spouse name: None    Number of children: 2    Years of education: None    Highest education level: None   Occupational History    None   Tobacco Use    Smoking status: Former Smoker     Packs/day: 0.00     Years: 30.00     Pack years: 0.00     Quit date: 2/10/2007     Years since quitting: 15.1    Smokeless tobacco: Never Used   Vaping Use    Vaping Use: Never used   Substance and Sexual Activity    Alcohol use: Yes     Comment: occas    Drug use: No    Sexual activity: None   Other Topics Concern    None   Social History Narrative    None     Social Determinants of Health     Financial Resource Strain: Low Risk     Difficulty of Paying Living Expenses: Not hard at all   Food Insecurity: No Food Insecurity    Worried About Running Out of Food in the Last Year: Never true    Joy of Food in the Last Year: Never true   Transportation Needs:     Lack of Transportation (Medical): Not on file    Lack of Transportation (Non-Medical):  Not on file   Physical Activity:     Days of Exercise per Week: Not on file    Minutes of Exercise per Session: Not on file   Stress:     Feeling of Stress : Not on file   Social Connections:     Frequency of Communication with Friends and Family: Not on file    Frequency of Social Gatherings with Friends and Family: Not on file    Attends [employer] Services: Not on file    Active Member of Clubs or Organizations: Not on file    Attends Club or Organization Meetings: Not on file    Marital Status: Not on file   Intimate Partner Violence:     Fear of Current or Ex-Partner: Not on file    Emotionally Abused: Not on file    Physically Abused: Not on file    Sexually Abused: Not on file   Housing Stability:     Unable to Pay for Housing in the Last Year: Not on file    Number of [place] in the Last Year: Not on file    Unstable Housing in the Last Year: Not on file       SURGICAL HISTORY    Past Surgical History:   Procedure Laterality Date   [address]  01/2015, 9/2017    COLONOSCOPY  2012    COLONOSCOPY  08/04/2017    PSEUDOMEMBRANOUS COLITIS WITH MUCOSAL ISCHEMIA, C DIFF POS    DILATION AND CURETTAGE OF UTERUS      JOINT REPLACEMENT Right [address]74    hip-complicated with rt drop foot-Dr. Toya Glynn JOINT REPLACEMENT Left 2017    hip-Dr. Alex Hernandez    TN COLONOSCOPY W/BIOPSY SINGLE/MULTIPLE N/A 8/4/2017    COLONOSCOPY WITH BIOPSY performed by Kelvin Nguyen MD at [address] EGD TRANSORAL BIOPSY SINGLE/MULTIPLE N/A 8/4/2017    EGD BIOPSY performed by Kelvin Nguyen MD at [address] [facility] [address] Left 03/07/2017    UPPER GASTROINTESTINAL ENDOSCOPY  08/04/2017    MILD CHRONIC GASTRITIS        CURRENT MEDICATIONS    Current Outpatient Medications   Medication Sig Dispense Refill    HYDROcodone-acetaminophen (NORCO) 5-325 MG per tablet Take 1 tablet by mouth every 8 hours as needed for Pain for up to 20 days.  60 tablet 0    famotidine (PEPCID) 20 MG tablet Take 1 tablet by mouth 2 times daily 60 tablet 3    pantoprazole (PROTONIX) 40 MG tablet Take 1 tablet by mouth every morning (before breakfast) 90 tablet 1    metoprolol succinate (TOPROL XL) 50 MG extended release tablet Take 1 tablet by mouth daily 30 tablet 5    atorvastatin (LIPITOR) 40 MG tablet Take 1 tablet by mouth daily 30 tablet 5    nitroGLYCERIN (NITROSTAT) 0.4 MG SL tablet up to max of 3 total doses. If no relief after 1 dose, call 911. 25 tablet 3    primidone (MYSOLINE) 50 MG tablet Take 1 tablet by mouth 3 times daily 90 tablet 3    tiZANidine (ZANAFLEX) 4 MG tablet TAKE ONE TABLET BY MOUTH FOUR TIMES A DAY AS NEEDED FOR MUSCLE SPASMS 40 tablet 0    traZODone (DESYREL) 50 MG tablet TAKE ONE TABLET BY MOUTH ONCE NIGHTLY 60 tablet 5    bumetanide (BUMEX) 0.5 MG tablet Take 1 tablet by mouth daily 30 tablet 2    levothyroxine (SYNTHROID) 100 MCG tablet TAKE ONE TABLET BY MOUTH DAILY 90 tablet 3    spironolactone (ALDACTONE) 25 MG tablet Take 25 mg by mouth daily      sacubitril-valsartan (ENTRESTO) 24-26 MG per tablet Take 1 tablet by mouth 2 times daily      diclofenac sodium (VOLTAREN) 1 % GEL Apply 4 g topically every 4 hours as needed for Pain 1 Tube 5    triamcinolone (KENALOG) 0.025 % cream Apply 1 applicator topically 2 times daily Indications: on face Apply Topically      aspirin 81 MG tablet Take 325 mg by mouth 2 times daily       Multiple Vitamins-Minerals (CENTRUM SILVER PO) Take 1 tablet by mouth daily       No current facility-administered medications for this visit. ALLERGIES    No Known Allergies    PHYSICAL EXAM   Physical Exam  Vitals reviewed. Constitutional:       Appearance: She is well-developed. HENT:      Head: Normocephalic. Eyes:      Conjunctiva/sclera: Conjunctivae normal.      Pupils: Pupils are equal, round, and reactive to light. Neck:      Thyroid: No thyromegaly. Cardiovascular:      Rate and Rhythm: Normal rate and regular rhythm. Heart sounds: Normal heart sounds. No murmur heard. Pulmonary:      Effort: Pulmonary effort is normal.      Breath sounds: Normal breath sounds. No wheezing or rales. Abdominal:      Palpations: Abdomen is soft. Tenderness: There is no abdominal tenderness.  There is no guarding or rebound. Musculoskeletal:         General: Deformity (oa changes) present. No tenderness. Normal range of motion. Cervical back: Normal range of motion and neck supple. Lymphadenopathy:      Cervical: No cervical adenopathy. Skin:     General: Skin is warm and dry. Neurological:      Mental Status: She is alert and oriented to person, place, and time. Gait: Gait abnormal.      Comments: Rt foot drop, fine tremor with writing   Psychiatric:         Mood and Affect: Mood normal.         Behavior: Behavior normal.         Thought Content: Thought content normal.         Judgment: Judgment normal.         ASSESSMENT/PLAN  1. Gastroesophageal reflux disease, unspecified whether esophagitis present  Stop omeprazole and start pantoprazole. She may take Pepcid or other over-the-counter antiacid if needed during the day. Discussed that she may need an EGD but she was concerned that she would not be able to do that as she has no one to take her.  - famotidine (PEPCID) 20 MG tablet; Take 1 tablet by mouth 2 times daily  Dispense: 60 tablet; Refill: 3  - pantoprazole (PROTONIX) 40 MG tablet; Take 1 tablet by mouth every morning (before breakfast)  Dispense: 90 tablet; Refill: 1    2. Primary osteoarthritis involving multiple joints  Continue chronic pain medication as prescribed. - HYDROcodone-acetaminophen (NORCO) 5-325 MG per tablet; Take 1 tablet by mouth every 8 hours as needed for Pain for up to 20 days. Dispense: 60 tablet; Refill: 0    3. DDD (degenerative disc disease), lumbar  Controlled substances monitoring: possible medication side effects, risk of tolerance and/or dependence, and alternative treatments discussed, no signs of potential drug abuse or diversion identified, [place]AR[place] report reviewed today- activity consistent with treatment plan and medication contract signed today.    - HYDROcodone-acetaminophen ([address]) 5-325 MG per tablet;  Take 1 tablet by mouth every 8 hours as needed for Pain for up to 20 days. Dispense: 60 tablet; Refill: 0    4. Chronic diastolic congestive heart failure (HCC)  Chronic and stable. Continue to follow-up with cardiologist    5. NSVT (nonsustained ventricular tachycardia) ([facility])  Chronic, no current symptoms. Continue beta-blocker and follow with cardiologist    6. Coronary artery disease involving native coronary artery of native heart with angina pectoris ([facility] [address]  Discussed using nitro if chest pain occurs if it does not resolve then she can try antacid. If still having chest pain go to ED  - nitroGLYCERIN (NITROSTAT) 0.4 MG SL tablet; up to max of 3 total doses. If no relief after 1 dose, call 911. Dispense: 25 tablet; Refill: 3    7. Gait disturbance  Continue using walker for stability    8. Mixed hyperlipidemia  Continue statin  - atorvastatin (LIPITOR) 40 MG tablet; Take 1 tablet by mouth daily  Dispense: 30 tablet; Refill: 5    9. Tremor of right hand  Trial of medication. Could refer to neuro if not improving or if worsening.  - primidone (MYSOLINE) 50 MG tablet; Take 1 tablet by mouth 3 times daily  Dispense: 90 tablet; Refill: 3       Ally received counseling on the following healthy behaviors: nutrition, exercise and medication adherence  Reviewed prior labs and health maintenance. Continue current medications, diet and exercise. Discussed use, benefit, and side effects of prescribed medications. Barriers to medication compliance addressed. Patient given educational materials - see patient instructions. All patient questions answered. Patient voiced understanding. Return for chronic pain.         Electronically signed by Angel Dickey MD on 4/5/22 at 1:11 PM EDT

## 2022-04-06 ENCOUNTER — TELEPHONE (OUTPATIENT)
Dept: FAMILY MEDICINE CLINIC | Age: 77
End: 2022-04-06

## 2022-04-06 NOTE — TELEPHONE ENCOUNTER
Patient called in stating that she received some Norco for muscle spasms and she stated that it is making her dizzy and is making her feel as if she is about to vomit. I did advise the patient that dizziness is a side effect and she states that she will not take the medication again.       Please advise

## 2022-04-06 NOTE — TELEPHONE ENCOUNTER
Pt has been informed to stop medication pt stated she feels drunk.  Pt stated she took the Norco around 7 am and still can not lay down is still feeling dizzy

## 2022-04-11 NOTE — TELEPHONE ENCOUNTER
Patient called in stating that she is taking the trazodone and it is causing her to have some severe diarrhea.     Please advise

## 2022-05-25 DIAGNOSIS — M15.9 PRIMARY OSTEOARTHRITIS INVOLVING MULTIPLE JOINTS: ICD-10-CM

## 2022-05-25 DIAGNOSIS — M51.36 DDD (DEGENERATIVE DISC DISEASE), LUMBAR: ICD-10-CM

## 2022-05-25 RX ORDER — HYDROCODONE BITARTRATE AND ACETAMINOPHEN 5; 325 MG/1; MG/1
1 TABLET ORAL EVERY 8 HOURS PRN
Qty: 60 TABLET | Refills: 0 | Status: SHIPPED | OUTPATIENT
Start: 2022-05-25 | End: 2022-07-07 | Stop reason: SDUPTHER

## 2022-05-25 NOTE — TELEPHONE ENCOUNTER
Last visit: 4/5/2022  Last Med refill: 4/5/2022  Does patient have enough medication for 72 hours:     Next Visit Date:10/4/2022  Future Appointments   Date Time Provider Jovany Lucioi   10/4/2022  1:30 PM Twan Couch MD [facility] 30 Maintenance   Topic Date Due    Annual Wellness Visit (AWV)  Never done    DTaP/Tdap/Td vaccine (2 - Td or Tdap) 04/04/2021    COVID-19 Vaccine (3 - Booster for Moderna series) 08/03/2021    Lipids  01/03/2022    Shingles vaccine (1 of 2) 10/10/2024 (Originally 3/21/1995)    Depression Screen  04/05/2023    DEXA (modify frequency per FRAX score)  Completed    Flu vaccine  Completed    Pneumococcal 65+ years Vaccine  Completed    Hepatitis C screen  Completed    Hepatitis A vaccine  Aged Out    Hepatitis B vaccine  Aged Out    Hib vaccine  Aged Out    Meningococcal (ACWY) vaccine  Aged Out       No results found for: LABA1C          ( goal A1C is < 7)   No results found for: LABMICR  LDL Cholesterol (mg/dL)   Date Value   01/03/2021 65   08/24/2019 84       (goal LDL is <100)   AST (U/L)   Date Value   12/20/2021 86 (H)     ALT (U/L)   Date Value   12/20/2021 48 (H)     BUN (mg/dL)   Date Value   04/01/2022 16     BP Readings from Last 3 Encounters:   04/05/22 137/76   04/01/22 130/66   12/21/21 (!) 112/47          (goal 120/80)    All Future Testing planned in [facility]  Lab Frequency Next Occurrence               Patient Active Problem List:     Essential hypertension     Fatigue     Acquired hypothyroidism     Right foot drop     NSVT (nonsustained ventricular tachycardia) (HCC)     Sigmoid diverticulosis     Primary osteoarthritis involving multiple joints     Mixed hyperlipidemia     Coronary artery disease involving native coronary artery of native heart with angina pectoris (HCC)     Esophageal reflux     Elevated liver enzymes     Chest pain     Troponin level elevated     Transaminasemia     Anemia, normocytic normochromic     Action tremor     Bilateral hearing loss     Gait disturbance     Lumbar facet joint syndrome     NSTEMI (non-ST elevated myocardial infarction) (HCC)     Hypoxia     Chronic diastolic congestive heart failure ([facility] [address]

## 2022-06-22 DIAGNOSIS — E03.9 HYPOTHYROIDISM, UNSPECIFIED TYPE: ICD-10-CM

## 2022-06-22 NOTE — TELEPHONE ENCOUNTER
Milka Vigil is calling to request a refill on the following medication(s):    Last Visit Date (If Applicable):  0/3/0392    Next Visit Date:    10/4/2022    Medication Request:  Requested Prescriptions     Pending Prescriptions Disp Refills    levothyroxine (SYNTHROID) 100 MCG tablet [Pharmacy Med Name: LEVOTHYROXINE 100 MCG TABLET] 90 tablet 3     Sig: TAKE ONE TABLET BY MOUTH DAILY

## 2022-06-23 RX ORDER — LEVOTHYROXINE SODIUM 0.1 MG/1
TABLET ORAL
Qty: 90 TABLET | Refills: 3 | Status: SHIPPED | OUTPATIENT
Start: 2022-06-23

## 2022-07-07 DIAGNOSIS — M51.36 DDD (DEGENERATIVE DISC DISEASE), LUMBAR: ICD-10-CM

## 2022-07-07 DIAGNOSIS — R25.1 TREMOR OF RIGHT HAND: ICD-10-CM

## 2022-07-07 DIAGNOSIS — M15.9 PRIMARY OSTEOARTHRITIS INVOLVING MULTIPLE JOINTS: ICD-10-CM

## 2022-07-07 RX ORDER — PRIMIDONE 50 MG/1
TABLET ORAL
Qty: 90 TABLET | Refills: 3 | Status: SHIPPED | OUTPATIENT
Start: 2022-07-07

## 2022-07-07 RX ORDER — HYDROCODONE BITARTRATE AND ACETAMINOPHEN 5; 325 MG/1; MG/1
1 TABLET ORAL EVERY 8 HOURS PRN
Qty: 60 TABLET | Refills: 0 | Status: SHIPPED | OUTPATIENT
Start: 2022-07-07 | End: 2022-07-27

## 2022-07-07 NOTE — PROGRESS NOTES
[facility]    Office: [address], DO, Jazz Lancaster, DO, Richard Murrell, DO, Elisabeth Levineangela Blood, DO, Blane Patterson MD, Ana Melchor MD, Maged Riley MD, Landy Mallory MD, Cheryl Martinez MD, Maegan Riggs MD, Elin Cox MD, Renetta Murillo MD, Jeremy House MD, Regulo Benitez DO, Kunal Funk MD, Charlene Gonzalez MD, Marquis Cruz DO, Mario Myers MD,  Aure Desai DO, Haily Cuba MD, Anne Pfeiffer MD, Caio Watters, RONIT, [facility] En[facility]quez, CNP, Finesse Hernandez CNP, Paula Larson, CNS, Anastacio Jean, CNP, Dhruv Jang, CNP, Suman Razo, CNP, Smiley Manning, CNP, Chadd Smith, CNP, Joseph Perkins PA-C, Ellen Clemente, [facility], Isai Townsend, CNP, Bree Armendariz, CNP, Sameer Curtis, CNP, Adela Glover, CNP, Yulia May, [address]    Progress Note    1/3/2021    11:23 AM    Name:   Yunior Gardner  MRN:     [id number]     Acct:      [de-identified]   Room:   2024/2024-01   Day:  0  Admit Date:  1/2/2021  4:27 PM    PCP:   Yaneli Landry MD  Code Status:  Full Code    Subjective:     C/C:   Chief Complaint   Patient presents with    Chest Pain    Gastroesophageal Reflux     Interval History Status: The patient reports that her symptoms have resolved completely  She feels that she is back to baseline  The patient is requesting discharge    Data Base Updates:  Troponin, High [id number]     WBC10.0k/uL RBC3. 68Low m/uL Gukkpfgtfm68.6Low       Brief History:     As documented in the medical record:  \"Ally Freitas is a 66 y.o. Non-/non  female who presents with Chest Pain and Gastroesophageal Reflux   and is admitted to the hospital for the management of Chest pain. The patient reports to the hospital with complaint of epigastric pain and chest pain. She states her pain started this afternoon after she ate sushi and has persisted throughout the day.  She also reports that she woke up this morning with right arm pain that has resolved. She reports her pain was originally 10/10, severe and dull. She states her pain is 3/10 at time of assessment and that the morphine she was given helped alleviated her pain. She was given GI cocktail and sublingual nitroglycerine in the ER without relief. She does endorse some chronic shortness of breath with activity. She denies fever, chills, nausea or vomiting. No additional symptomology or modifying factors. She has past medical history that includes MI, hypertension, dyslipidemia, hypothyroidism, right foot drop and neuropathy. She sees Dr. Willow Agudelo with cardiology outpatient. High sensitivity troponin 29, 24, 24. EKG interpretation per ER physician: \" EKG on my interpretation shows no acute finding\"     CXR shows stable chest x-ray including biapical pleuroparenchymal scarring from prior inflammatory/granulomatous disease. No acute cardiopulmonary disease. Echo 7/27/2018  Summary  Left ventricle is normal in size and function. Estimated LV EF 50-55%. Septal mild hypokinesis  Left atrium is at upper limits of normal.  No significant valvular regurgitation or stenosis seen. No pericardial effusion seen. Normal aortic root dimension. Lexiscan stress test 7/27/2018  100% max predicted heart rate:  145%  85% max predicted heart rate:  123%  Resting heart rate:  68  Maximum heart rate achieved:  101  Duration:  0.40  Resting BP:  130/61  Peak BP:  140/72  Peak Double Product: 10,080  % of Age Predicted Maximum:  67  METS:  1.0  Medications Given: 0.4 mg Lexiscan  Reason for Termination:  Medication Infusion Complete. Heart Rate Response:  Normal.  Blood Pressure Response:  Normal response. Baseline EKG demonstrated:  Sinus rhythm, ST-T wave changes. The exercise study demonstrated a maximum of 1 mm of downsloping ST  depression in leads II, III, aVF, V4-V6. ECG IMPRESSION:  Positive. FINAL IMPRESSION:  Positive.    Nuclear medicine report to follow. EV[facility] ROMERO     Cardiac cath 10/11/2017  Cath EF calculated 55%  · Post trans radial cardiac catheterization    · Mild coronary disease affecting the mid LAD as well as mid circumflex     coronary artery and also mid proximal and mid right coronary artery with     an 80% ostial stenosis in the 2nd diagonal artery which is similar to what     was reported in 2015    · Normal left ventricular contractility    · No mitral regurgitation or aortic stenosis    · Retrospectively the chest pain and borderline troponin elevation may     have been caused by his severe hypertension the patient had when she     presented to the emergency room yesterday        Recommendations:  Conservative medical management, may discharge home in     few hours and follow up with me in the office next week. Continue with     aggressive risk factor modification and optimal blood pressure control. The initial plan has included:  Past medical records reviewed. Nuclear stress test.  Repeat EKG in AM.  Pain control. Supplemental oxygen as needed. Check UA and urine culture. Hypothyroidism- check TSH, continue levothyroxine. Hyperlipidemia- check lipids, continue atorvastatin. Continue additional home medications. Monitor vital signs. Telemetry. Follow chemistries. Replete electrolytes as needed. DVT prophylaxis. NPO after midnight. Activity as tolerated with assist. \"     Subsequent database has included:  Stress test 2018: No stress-induced ischemia.  No infarct.  Breast attenuation artifact.       Risk stratification: Intermediate risk.  LVEF 41%   The patient reports that her \"stress test is always abnormal \"    Results for Silvino Guard (MRN [id number]) as of 1/3/2021 11:04   Ref.  Range 1/2/2021 16:47 1/2/2021 18:00 1/2/2021 20:56 1/3/2021 00:43   Troponin, High Sensitivity Latest Ref Range: 0 - 14 ng/L 29 (H) 24 (H) 24 (H) 22 (H)     EKG:  Sinus bradycardia with occasional Premature ventricular complexes  Left ventricular hypertrophy with repolarization abnormality  Abnormal ECG  When compared with ECG of 03-JAN-2021 01:37, (unconfirmed)  No significant change was found     Abdominal ultrasound 2018:  FINDINGS:   Study somewhat limited technically due to patient inability to suspend   respirations.       LIVER:  The liver demonstrates normal echogenicity without evidence of   intrahepatic biliary ductal dilatation.       BILIARY SYSTEM:  Gallbladder is unremarkable without evidence of   pericholecystic fluid, wall thickening or stones.  Negative sonographic   Prakash's sign.       Common bile duct is within normal limits measuring 4 mm.       RIGHT KIDNEY: The right kidney is grossly unremarkable without evidence of   hydronephrosis.       PANCREAS:  Visualized portions of the pancreas are unremarkable.             Medications:      Allergies:  No Known Allergies    Current Meds:   Scheduled Meds:    aspirin  325 mg Oral BID    atenolol  1 tablet Oral Daily    gabapentin  600 mg Oral TID    levothyroxine  1 tablet Oral Daily    pantoprazole  40 mg Oral QAM AC    atorvastatin  20 mg Oral Daily    sodium chloride flush  10 mL Intravenous 2 times per day    aspirin  325 mg Oral Daily    enoxaparin  40 mg Subcutaneous Daily     Continuous Infusions:   PRN Meds: sodium chloride flush, promethazine **OR** ondansetron, acetaminophen **OR** acetaminophen, magnesium hydroxide, potassium chloride **OR** potassium alternative oral replacement **OR** potassium chloride, potassium chloride, magnesium sulfate, nitroGLYCERIN, morphine **OR** morphine    Data:     Past Medical History:   has a past medical history of Arthritis, CAD (coronary artery disease), Esophageal reflux, Foot drop, right foot, Heart attack ([address] History of Clostridium difficile infection, HL (hearing loss), Hyperlipidemia, Hypertension, Hypothyroidism, Lumbar radiculopathy, Osteoarthritis, Osteopenia, Psychiatric problem, and Tremor. Social History:   reports that she quit smoking about 13 years ago. She smoked 0.00 packs per day for 30.00 years. She has never used smokeless tobacco. She reports current alcohol use. She reports that she does not use drugs. Family History:   Family History   Problem Relation Age of Onset    Heart Disease Mother     Heart Disease Father     Arthritis Father     Heart Disease Brother        Review of Systems:     Review of Systems   Respiratory: Negative for cough and shortness of breath. Cardiovascular: Negative for chest pain and palpitations. Gastrointestinal: Negative for abdominal pain, nausea and vomiting. No further dyspepsia or reflux symptoms   Genitourinary: Negative for flank pain and hematuria. Physical Examination:        Physical Exam  Vitals signs reviewed. Constitutional:       General: She is not in acute distress. Appearance: She is not diaphoretic. HENT:      Head: Normocephalic. Nose: Nose normal.   Eyes:      General: No scleral icterus. Conjunctiva/sclera: Conjunctivae normal.   Neck:      Musculoskeletal: Neck supple. Trachea: No tracheal deviation. Cardiovascular:      Rate and Rhythm: Normal rate and regular rhythm. Pulmonary:      Effort: Pulmonary effort is normal. No respiratory distress. Breath sounds: Normal breath sounds. No wheezing or rales. Chest:      Chest wall: No tenderness. Abdominal:      General: Bowel sounds are normal. There is no distension. Palpations: Abdomen is soft. Tenderness: There is no abdominal tenderness. There is no guarding. Comments: Negative Prakash sign   Musculoskeletal:         General: No tenderness. Skin:     General: Skin is warm and dry. Neurological:      Mental Status: She is alert and oriented to person, place, and time.          Vitals:  BP (!) 123/45   Pulse 58   Temp 97.9 °F (36.6 °C) (Oral)   Resp 16   Wt 138 lb 14.2 oz (63 kg)   SpO2 92%   BMI 23.11 kg/m²   Temp (24hrs), Av °F (36.7 °C), Min:97.9 °F (36.6 °C), Max:98.2 °F (36.8 °C)    No results for input(s): POCGLU in the last 72 hours. I/O (24Hr):     Intake/Output Summary (Last 24 hours) at 1/3/2021 1123  Last data filed at 1/3/2021 0707  Gross per 24 hour   Intake --   Output 100 ml   Net -100 ml       Labs:  Hematology:  Recent Labs     21  0633   WBC 14.7* 10.0   RBC 4.00 3.68*   HGB 12.7 11.6*   HCT 39.5 36.8   MCV 98.8 100.0   MCH 31.8 31.5   MCHC 32.2 31.5   RDW 14.0 14.1    274   MPV 9.1 9.3   INR 1.0  --      Chemistry:  Recent Labs     21  1800 21  0043 21  0633     --   --   --  138   K 3.8  --   --  3.8 3.7     --   --   --  103   CO2 25  --   --   --  26   GLUCOSE 138*  --   --   --  92   BUN 20  --   --   --  18   CREATININE 0.66  --   --   --  0.55   MG  --   --   --  1.8 1.9   ANIONGAP 11  --   --   --  9   LABGLOM >60  --   --   --  >60   GFRAA >60  --   --   --  >60   CALCIUM 9.4  --   --   --  9.2   TROPHS 29* 24* 24* 22*  --    MYOGLOBIN 41 43  --   --   --      Recent Labs     21  0633   PROT 7.0   LABALBU 3.7   TSH 1.27   AST 51*   ALT 39*   ALKPHOS 86   BILITOT 1.25*   CHOL 138   HDL 55   LDLCHOLESTEROL 65   CHOLHDLRATIO 2.5   TRIG 92   VLDL NOT REPORTED     ABG:No results found for: POCPH, PHART, PH, POCPCO2, AES5OHU, PCO2, POCPO2, PO2ART, PO2, POCHCO3, IOK0MHU, HCO3, NBEA, PBEA, BEART, BE, THGBART, THB, SCM5YAU, IFBL2XZQ, V7FIFAMP, O2SAT, FIO2  Lab Results   Component Value Date/Time    SPECIAL NOT REPORTED 2018 03:01 PM     Lab Results   Component Value Date/Time    CULTURE NO SIGNIFICANT GROWTH 2018 03:01 PM    CULTURE  2018 03:01 PM     [address] [phone]       Radiology:  Xr Chest Portable    Result Date: 2021  Stable chest x-ray including biapical pleuroparenchymal scarring from prior inflammatory/granulomatous disease. No acute cardiopulmonary disease. Assessment:        Principal Problem:    Chest pain  Active Problems:    Essential hypertension    Acquired hypothyroidism    Mixed hyperlipidemia    Coronary artery disease involving native coronary artery of native heart with angina pectoris (HCC)    Esophageal reflux    Elevated liver enzymes    Troponin level elevated    Transaminasemia    Anemia, normocytic normochromic  Resolved Problems:    * No resolved hospital problems.  *      Plan:        Serial EKG and enzymes  Aspirin  Atenolol  Lipitor  Reflux precautions  PPI  Stress test    The patient's status and plan have been discussed with the patient and son at the bedside   They are both convinced that the patient's symptoms are due to GERD, precipitated by eating sushi, not Angina  They both are requesting discharge despite my concerns that underlying ischemia cannot be totally excluded (with Trop elevation)  They do not want to stay for stress test tomorrow  They will sign out AMA, understanding risks including death  I have asked them to F/U with Dr Candida Segura stress test ordered  Suggest outpatient work-up for abnormal LFTs, GERD  Anemia w/u on outpatient basis is suggested    Thyroid replacement titrated to normalize thyroid hormones    The patient was instructed to follow up with their PCP, Anthony Coates MD in one week     IP CONSULT TO HOSPITALIST    Meribeth Rinne, DO  1/3/2021  11:23 AM EMT/paramedic

## 2022-07-11 DIAGNOSIS — M51.36 DDD (DEGENERATIVE DISC DISEASE), LUMBAR: ICD-10-CM

## 2022-07-11 DIAGNOSIS — M15.9 PRIMARY OSTEOARTHRITIS INVOLVING MULTIPLE JOINTS: ICD-10-CM

## 2022-07-11 DIAGNOSIS — R25.1 TREMOR OF RIGHT HAND: ICD-10-CM

## 2022-07-11 RX ORDER — HYDROCODONE BITARTRATE AND ACETAMINOPHEN 5; 325 MG/1; MG/1
1 TABLET ORAL EVERY 8 HOURS PRN
Qty: 60 TABLET | Refills: 0 | OUTPATIENT
Start: 2022-07-11 | End: 2022-07-31

## 2022-07-11 RX ORDER — PRIMIDONE 50 MG/1
TABLET ORAL
Qty: 90 TABLET | Refills: 3 | OUTPATIENT
Start: 2022-07-11

## 2022-08-04 ENCOUNTER — TELEPHONE (OUTPATIENT)
Dept: FAMILY MEDICINE CLINIC | Age: 77
End: 2022-08-04

## 2022-08-04 DIAGNOSIS — U07.1 COVID-19: Primary | ICD-10-CM

## 2022-08-04 NOTE — TELEPHONE ENCOUNTER
Patient called in stating she tested positive for covid on Monday she is having the following sx she wants to know if a abx could be called in to her pharmacy    Lost of appetite  No fever   No energy  Body aches   Diarrhea  No SOB    Please advise

## 2022-09-28 DIAGNOSIS — K21.9 GASTROESOPHAGEAL REFLUX DISEASE, UNSPECIFIED WHETHER ESOPHAGITIS PRESENT: ICD-10-CM

## 2022-09-28 RX ORDER — PANTOPRAZOLE SODIUM 40 MG/1
TABLET, DELAYED RELEASE ORAL
Qty: 90 TABLET | Refills: 1 | Status: SHIPPED | OUTPATIENT
Start: 2022-09-28

## 2022-11-16 DIAGNOSIS — R25.1 TREMOR OF RIGHT HAND: ICD-10-CM

## 2022-11-16 RX ORDER — PRIMIDONE 50 MG/1
TABLET ORAL
Qty: 90 TABLET | Refills: 3 | Status: SHIPPED | OUTPATIENT
Start: 2022-11-16

## 2022-11-16 NOTE — TELEPHONE ENCOUNTER
Trinidad Bustos is calling to request a refill on the following medication(s):    Last Visit Date (If Applicable):  3/8/5751    Next Visit Date:    11/28/2022    Medication Request:  Requested Prescriptions     Pending Prescriptions Disp Refills    primidone (MYSOLINE) 50 MG tablet [Pharmacy Med Name: PRIMIDONE 50 MG TABLET] 90 tablet 3     Sig: TAKE ONE TABLET BY MOUTH THREE TIMES A DAY

## 2023-01-26 DIAGNOSIS — F51.01 PRIMARY INSOMNIA: ICD-10-CM

## 2023-01-26 RX ORDER — TRAZODONE HYDROCHLORIDE 50 MG/1
TABLET ORAL
Qty: 60 TABLET | Refills: 0 | Status: SHIPPED | OUTPATIENT
Start: 2023-01-26 | End: 2023-03-29 | Stop reason: SDUPTHER

## 2023-01-26 NOTE — TELEPHONE ENCOUNTER
Please call patient to schedule an appointment with PCP.  Needs in person as she hasn't seen since 4/2022

## 2023-01-26 NOTE — TELEPHONE ENCOUNTER
Angel Colunga is calling to request a refill on the following medication(s):    Last Visit Date (If Applicable):  0/6/0073    Next Visit Date:    Visit date not found    Medication Request:  Requested Prescriptions     Pending Prescriptions Disp Refills    traZODone (DESYREL) 50 MG tablet [Pharmacy Med Name: traZODone 50 MG TABLET] 60 tablet 5     Sig: TAKE ONE TABLET BY MOUTH ONCE NIGHTLY

## 2023-02-05 ENCOUNTER — HOSPITAL ENCOUNTER (INPATIENT)
Age: 78
LOS: 1 days | Discharge: HOME | DRG: 445 | End: 2023-02-06
Attending: INTERNAL MEDICINE | Admitting: INTERNAL MEDICINE
Payer: COMMERCIAL

## 2023-02-05 DIAGNOSIS — K21.9 GASTROESOPHAGEAL REFLUX DISEASE, UNSPECIFIED WHETHER ESOPHAGITIS PRESENT: ICD-10-CM

## 2023-02-05 PROBLEM — K81.0 ACUTE CHOLECYSTITIS: Status: ACTIVE | Noted: 2023-02-05

## 2023-02-05 PROCEDURE — 1200000000 HC SEMI PRIVATE

## 2023-02-05 PROCEDURE — 96366 THER/PROPH/DIAG IV INF ADDON: CPT

## 2023-02-05 PROCEDURE — 99221 1ST HOSP IP/OBS SF/LOW 40: CPT | Performed by: NURSE PRACTITIONER

## 2023-02-05 PROCEDURE — G0378 HOSPITAL OBSERVATION PER HR: HCPCS

## 2023-02-05 PROCEDURE — 2580000003 HC RX 258: Performed by: NURSE PRACTITIONER

## 2023-02-05 PROCEDURE — 6360000002 HC RX W HCPCS: Performed by: NURSE PRACTITIONER

## 2023-02-05 PROCEDURE — 96365 THER/PROPH/DIAG IV INF INIT: CPT

## 2023-02-05 PROCEDURE — G0379 DIRECT REFER HOSPITAL OBSERV: HCPCS

## 2023-02-05 PROCEDURE — 96375 TX/PRO/DX INJ NEW DRUG ADDON: CPT

## 2023-02-05 RX ORDER — SODIUM CHLORIDE 9 MG/ML
INJECTION, SOLUTION INTRAVENOUS CONTINUOUS
Status: DISCONTINUED | OUTPATIENT
Start: 2023-02-05 | End: 2023-02-06

## 2023-02-05 RX ORDER — SODIUM CHLORIDE 9 MG/ML
INJECTION, SOLUTION INTRAVENOUS PRN
Status: DISCONTINUED | OUTPATIENT
Start: 2023-02-05 | End: 2023-02-06 | Stop reason: HOSPADM

## 2023-02-05 RX ORDER — SODIUM CHLORIDE 0.9 % (FLUSH) 0.9 %
10 SYRINGE (ML) INJECTION PRN
Status: DISCONTINUED | OUTPATIENT
Start: 2023-02-05 | End: 2023-02-06 | Stop reason: HOSPADM

## 2023-02-05 RX ORDER — SODIUM CHLORIDE 0.9 % (FLUSH) 0.9 %
5-40 SYRINGE (ML) INJECTION EVERY 12 HOURS SCHEDULED
Status: DISCONTINUED | OUTPATIENT
Start: 2023-02-05 | End: 2023-02-06 | Stop reason: HOSPADM

## 2023-02-05 RX ORDER — ONDANSETRON 2 MG/ML
4 INJECTION INTRAMUSCULAR; INTRAVENOUS EVERY 6 HOURS PRN
Status: DISCONTINUED | OUTPATIENT
Start: 2023-02-05 | End: 2023-02-06 | Stop reason: HOSPADM

## 2023-02-05 RX ORDER — ACETAMINOPHEN 650 MG/1
650 SUPPOSITORY RECTAL EVERY 6 HOURS PRN
Status: DISCONTINUED | OUTPATIENT
Start: 2023-02-05 | End: 2023-02-06 | Stop reason: HOSPADM

## 2023-02-05 RX ORDER — POLYETHYLENE GLYCOL 3350 17 G/17G
17 POWDER, FOR SOLUTION ORAL DAILY PRN
Status: DISCONTINUED | OUTPATIENT
Start: 2023-02-05 | End: 2023-02-06 | Stop reason: HOSPADM

## 2023-02-05 RX ORDER — POTASSIUM CHLORIDE 20 MEQ/1
40 TABLET, EXTENDED RELEASE ORAL PRN
Status: DISCONTINUED | OUTPATIENT
Start: 2023-02-05 | End: 2023-02-06 | Stop reason: HOSPADM

## 2023-02-05 RX ORDER — LEVOTHYROXINE SODIUM 0.1 MG/1
100 TABLET ORAL DAILY
Status: DISCONTINUED | OUTPATIENT
Start: 2023-02-06 | End: 2023-02-06 | Stop reason: HOSPADM

## 2023-02-05 RX ORDER — MORPHINE SULFATE 2 MG/ML
2 INJECTION, SOLUTION INTRAMUSCULAR; INTRAVENOUS EVERY 4 HOURS PRN
Status: DISCONTINUED | OUTPATIENT
Start: 2023-02-05 | End: 2023-02-06 | Stop reason: HOSPADM

## 2023-02-05 RX ORDER — MAGNESIUM SULFATE 1 G/100ML
1000 INJECTION INTRAVENOUS PRN
Status: DISCONTINUED | OUTPATIENT
Start: 2023-02-05 | End: 2023-02-06 | Stop reason: HOSPADM

## 2023-02-05 RX ORDER — METOPROLOL SUCCINATE 50 MG/1
50 TABLET, EXTENDED RELEASE ORAL DAILY
Status: DISCONTINUED | OUTPATIENT
Start: 2023-02-05 | End: 2023-02-06 | Stop reason: HOSPADM

## 2023-02-05 RX ORDER — BUMETANIDE 1 MG/1
0.5 TABLET ORAL DAILY
Status: DISCONTINUED | OUTPATIENT
Start: 2023-02-05 | End: 2023-02-06 | Stop reason: HOSPADM

## 2023-02-05 RX ORDER — POTASSIUM CHLORIDE 7.45 MG/ML
10 INJECTION INTRAVENOUS PRN
Status: DISCONTINUED | OUTPATIENT
Start: 2023-02-05 | End: 2023-02-06 | Stop reason: HOSPADM

## 2023-02-05 RX ORDER — PANTOPRAZOLE SODIUM 40 MG/1
40 TABLET, DELAYED RELEASE ORAL
Status: DISCONTINUED | OUTPATIENT
Start: 2023-02-06 | End: 2023-02-06 | Stop reason: HOSPADM

## 2023-02-05 RX ORDER — ACETAMINOPHEN 325 MG/1
650 TABLET ORAL EVERY 6 HOURS PRN
Status: DISCONTINUED | OUTPATIENT
Start: 2023-02-05 | End: 2023-02-06 | Stop reason: HOSPADM

## 2023-02-05 RX ORDER — ONDANSETRON 4 MG/1
4 TABLET, ORALLY DISINTEGRATING ORAL EVERY 8 HOURS PRN
Status: DISCONTINUED | OUTPATIENT
Start: 2023-02-05 | End: 2023-02-06 | Stop reason: HOSPADM

## 2023-02-05 RX ADMIN — ONDANSETRON 4 MG: 2 INJECTION INTRAMUSCULAR; INTRAVENOUS at 20:49

## 2023-02-05 RX ADMIN — PIPERACILLIN AND TAZOBACTAM 3375 MG: 3; .375 INJECTION, POWDER, FOR SOLUTION INTRAVENOUS at 22:35

## 2023-02-05 RX ADMIN — SODIUM CHLORIDE: 9 INJECTION, SOLUTION INTRAVENOUS at 20:45

## 2023-02-05 ASSESSMENT — PAIN - FUNCTIONAL ASSESSMENT: PAIN_FUNCTIONAL_ASSESSMENT: PREVENTS OR INTERFERES SOME ACTIVE ACTIVITIES AND ADLS

## 2023-02-05 ASSESSMENT — ENCOUNTER SYMPTOMS
CHEST TIGHTNESS: 0
CONSTIPATION: 0
DIARRHEA: 0
SHORTNESS OF BREATH: 0
VOMITING: 1
NAUSEA: 1
COUGH: 0
ABDOMINAL PAIN: 1

## 2023-02-05 ASSESSMENT — PAIN DESCRIPTION - LOCATION
LOCATION: ABDOMEN
LOCATION: ABDOMEN

## 2023-02-05 ASSESSMENT — PAIN DESCRIPTION - ONSET: ONSET: ON-GOING

## 2023-02-05 ASSESSMENT — PAIN DESCRIPTION - FREQUENCY: FREQUENCY: INTERMITTENT

## 2023-02-05 ASSESSMENT — PAIN DESCRIPTION - ORIENTATION
ORIENTATION: RIGHT;UPPER
ORIENTATION: RIGHT;UPPER

## 2023-02-05 ASSESSMENT — PAIN SCALES - GENERAL: PAINLEVEL_OUTOF10: 3

## 2023-02-05 ASSESSMENT — PAIN DESCRIPTION - DESCRIPTORS: DESCRIPTORS: ACHING

## 2023-02-05 NOTE — CONSULTS
General Surgery:  Consult Note        PATIENT NAME: Jinny Dickson   YOB: 1945    ADMISSION DATE: No admission date for patient encounter. Admitting Provider: Dr. Noelle Harrell Physician: Dr. Georgiana Winn: 2/5/2023    Chief Complaint:  abdomilal pain  Consult Regarding:  cholecystitis    HISTORY OF PRESENT ILLNESS:  The patient is a 76 y.o. female  who presented to [facility] yesterday complaining of severe abdominal pain. Patient is a poor historian. She reports the pain began after eating cereal for breakfast and was sharp and severe in nature. This did not resolve after some hours so she went to [facility] for evaluation. CT abdomen/pelvis with IV contrast on there showed a very distended gallbladder measuring up to 10.8 cm. Total bilirubin of 1.5 and lipase of 95. Out of concern for cholecystitis versus symptomatic biliary colic, patient was transferred to [facility] for surgical management at the patient's request due to insurance compatibility. This morning, patient reports her abdominal pain is significantly improved. She does report she has had episodes similar to this of postprandial, severe abdominal pain in the RUQ. She reports it does not matter what she eats but that eating does seem to trigger the symptoms. These have always self resolved but yesterday's episode was the worst.  At times this is associated with nausea and emesis, and she did have 3 episodes of emesis since pain onset yesterday including after arriving at [facility]. Patient currently denies this morning CP, SOB, N/V, C/F. Patient is a former smoker with 20 pack years who quit many years ago. She sees a cardiologist for CAD. Denies history of heart attack or stroke, although her history does show an MI without specified date. She currently takes aspirin, no anticoagulation. She denies previous abdominal surgery.   She is tearful during examination and states her spouse recently passed away. Past Medical History:        Diagnosis Date    Arthritis     CAD (coronary artery disease)     Elevated liver enzymes     Esophageal reflux     Foot drop, right foot     Heart attack ([address]     patient does not know when    History of Clostridium difficile infection     treated on 08/12/17    HL (hearing loss)     Hyperlipidemia     Hypertension     Hypothyroidism     Lumbar radiculopathy     Osteoarthritis     Osteopenia     Psychiatric problem     Tremor     hand       Past Surgical History:        Procedure Laterality Date    BLADDER SUSPENSION      CARDIAC CATHETERIZATION  01/2015, 9/2017    COLONOSCOPY  2012    COLONOSCOPY  08/04/2017    PSEUDOMEMBRANOUS COLITIS WITH MUCOSAL ISCHEMIA, C DIFF POS    DILATION AND CURETTAGE OF UTERUS      JOINT REPLACEMENT Right 4207    hip-complicated with rt drop foot-     JOINT REPLACEMENT Left 2017    hip-Dr. Salinas Lunch    [place] COLONOSCOPY W/BIOPSY SINGLE/MULTIPLE N/A 8/4/2017    COLONOSCOPY WITH BIOPSY performed by Cristobal Barahona MD at [address] [place] [address] EGD TRANSORAL BIOPSY SINGLE/MULTIPLE N/A 8/4/2017    EGD BIOPSY performed by Cristobal Barahona MD at [facility] 03/07/2017    UPPER GASTROINTESTINAL ENDOSCOPY  08/04/2017    MILD CHRONIC GASTRITIS        Medications Prior to Admission:   No medications prior to admission. Allergies:  Patient has no known allergies.     Social History:   Social History     Socioeconomic History    Marital status:      Spouse name: Not on file    Number of children: 2    Years of education: Not on file    Highest education level: Not on file   Occupational History    Not on file   Tobacco Use    Smoking status: Former     Packs/day: 0.00     Years: 30.00     Pack years: 0.00     Types: Cigarettes     Quit date: 2/10/2007     Years since quitting: 15.9    Smokeless tobacco: Never   Vaping Use    Vaping Use: Never used Substance and Sexual Activity    Alcohol use: Yes     Comment: occas    Drug use: No    Sexual activity: Not on file   Other Topics Concern    Not on file   Social History Narrative    Not on file     Social Determinants of Health     Financial Resource Strain: Not on file   Food Insecurity: Not on file   Transportation Needs: Not on file   Physical Activity: Not on file   Stress: Not on file   Social Connections: Not on file   Intimate Partner Violence: Not on file   Housing Stability: Not on file       Family History:       Problem Relation Age of Onset    Heart Disease Mother     Heart Disease Father     Arthritis Father     Heart Disease Brother        REVIEW OF SYSTEMS:    CONSTITUTIONAL: Denies recent weight loss, fatigue, fevers, chills. HEENT: Denies rhinorrhea, dysphagia, odynphagia. CARDIOVASCULAR: Denies history of MI, recent chest pain. RESPIRATORY: Denies recent history of shortness of breath or history of PE. GASTROINTESTINAL: Admits to abdominal pain, nausea, emesis  GENITOURINARY: Denies increased frequency or dysuria. HEMATOLOGIC/LYMPHATIC: Denies history of anemia or DVTs. ENDOCRINE: Denies history of thyroid problems or diabetes. NEURO: Denies history of CVA, TIA. Review of systems negative unless listed above. PHYSICAL EXAM:    VITALS:  There were no vitals taken for this visit. INTAKE/OUTPUT:   No intake or output data in the 24 hours ending 02/05/23 1701    CONSTITUTIONAL:  awake, alert, tearful and normal weight  HEENT: Normocephalic/atraumatic, without obvious abnormality. NECK:  Supple, symmetrical, trachea midline   CARDIOVASCULAR: Regular rate and rhythm   LUNGS: Unlabored breathing on RA  ABDOMEN: Soft, nondistended, mild to moderate TTP RUQ without positive Prakash sign. Without rigidity/rebound  MUSCULOSKELETAL: Muscle strength intact in all extremities bilaterally. NEUROLOGIC: CN II- XII intact. Gross motor intact without focal weakness.   SKIN: No cyanosis, rashes, or edema noted.    Orientation:   oriented to person, place, and time      CBC with Differential:    Lab Results   Component Value Date/Time    WBC 8.9 02/06/2023 06:13 AM    RBC 3.35 02/06/2023 06:13 AM    HGB 10.6 02/06/2023 06:13 AM    HCT 32.3 02/06/2023 06:13 AM     02/06/2023 06:13 AM    MCV 96.4 02/06/2023 06:13 AM    MCH 31.6 02/06/2023 06:13 AM    MCHC 32.8 02/06/2023 06:13 AM    RDW 12.5 02/06/2023 06:13 AM    LYMPHOPCT 23 02/06/2023 06:13 AM    MONOPCT 8 02/06/2023 06:13 AM    BASOPCT 1 02/06/2023 06:13 AM    MONOSABS 0.67 02/06/2023 06:13 AM    LYMPHSABS 2.05 02/06/2023 06:13 AM    EOSABS 0.05 02/06/2023 06:13 AM    BASOSABS 0.06 02/06/2023 06:13 AM    DIFFTYPE NOT REPORTED 12/20/2021 10:03 PM     CMP:    Lab Results   Component Value Date/Time     02/06/2023 06:13 AM    K 3.9 02/06/2023 06:13 AM     02/06/2023 06:13 AM    CO2 25 02/06/2023 06:13 AM    BUN 17 02/06/2023 06:13 AM    CREATININE 1.15 02/06/2023 06:13 AM    GFRAA >60 04/01/2022 02:05 PM    LABGLOM 48 02/06/2023 06:13 AM    GLUCOSE 95 02/06/2023 06:13 AM    PROT 6.0 02/06/2023 06:13 AM    LABALBU 3.5 02/06/2023 06:13 AM    CALCIUM 8.7 02/06/2023 06:13 AM    BILITOT 1.0 02/06/2023 06:13 AM    ALKPHOS 81 02/06/2023 06:13 AM    AST 45 02/06/2023 06:13 AM    ALT 45 02/06/2023 06:13 AM     BMP:    Lab Results   Component Value Date/Time     02/06/2023 06:13 AM    K 3.9 02/06/2023 06:13 AM     02/06/2023 06:13 AM    CO2 25 02/06/2023 06:13 AM    BUN 17 02/06/2023 06:13 AM    LABALBU 3.5 02/06/2023 06:13 AM    CREATININE 1.15 02/06/2023 06:13 AM    CALCIUM 8.7 02/06/2023 06:13 AM    GFRAA >60 04/01/2022 02:05 PM    LABGLOM 48 02/06/2023 06:13 AM    GLUCOSE 95 02/06/2023 06:13 AM     Hepatic Function Panel:    Lab Results   Component Value Date/Time    ALKPHOS 81 02/06/2023 06:13 AM    ALT 45 02/06/2023 06:13 AM    AST 45 02/06/2023 06:13 AM    PROT 6.0 02/06/2023 06:13 AM    BILITOT 1.0 02/06/2023 06:13 AM    BILIDIR 0.13 08/09/2018 09:18 PM    IBILI 0.29 08/09/2018 09:18 PM    LABALBU 3.5 02/06/2023 06:13 AM       Pertinent Radiology:   XR CHEST (2 VW)    Result Date: 4/1/2022  EXAMINATION: TWO XRAY VIEWS OF THE CHEST 4/1/2022 2:28 pm COMPARISON: 12/20/2021 HISTORY: ORDERING SYSTEM PROVIDED HISTORY: chest pain TECHNOLOGIST PROVIDED HISTORY: chest pain Reason for Exam: Pt c/o chest pain, SOB. AP/LAT FINDINGS: Normal cardiomediastinal silhouette. There is some hyperinflation of the lungs. No focal consolidation. No pulmonary edema. No pleural effusion or pneumothorax. No acute process in the bones. Some hyperinflation. No focal consolidation. CT A/P with contrast [facility], 2/5/23  Narrative & Impression  CLINICAL HISTORY: Epigastric pain. CT ABDOMEN AND PELVIS WITH CONTRAST: 2/5/2023     PROCEDURE: Axial images were obtained through the abdomen and pelvis after oral contrast ingestion and 100 mL Omnipaque 300 intravenously. Coronal and sagittal reconstructions were performed. All CT scans at this facility use dose modulation, iterative reconstruction, and/or weight based dosing when   appropriate to reduce radiation dose to as low as reasonably achievable. FINDINGS :      Visualized lower lungs and pleural spaces are clear. No focal hepatic or splenic parenchymal abnormality is present. There is intra and extrahepatic biliary dilation with gallbladder dilation. The gallbladder measures over 10 cm in length. No focal pancreatic abnormality is present. The adrenal glands are within normal limits     The kidneys enhance symmetrically with no focal lesion. There is no pelvocaliectasis or ureteral dilation. Evaluation of bowel is limited by beam hardening artifact caused by the patient's hip arthroplasties. Diverticuli present to the sigmoid region with no definite inflammatory changes. There is   no extraluminal gas or definite extraluminal fluid. No dilated bowel loops are present.      Dense atherosclerotic calcification is evident within the aorta and its branches. There is no gross osseous abnormality acutely. Lumbar degenerative changes are present with levoconvex curvature. Hip arthroplasties are present. IMPRESSION:     Gallbladder distention. Correlate for signs of acute cholecystitis     Nonobstructive bowel gas pattern with no other acute intra-abdominal pelvic abnormality within the limitations of noncontrast CT evaluation in this patient. Workstation:[id number]     Finalized by Mi Toscano MD on 2/5/2023 2:35 PM              ASSESSMENT:    77F with acute cholecystitis    Plan:  Examined the patient in person. Discussed the patient, history, physical, labs, imaging with Dr. Kaushik Ann. Patient clinical history consistent with symptomatic biliary colic. Patient has a very dilated gallbladder up to 10.8 cm on CT. History consistent with postprandial pain every few weeks which is worsening. Patient with RUQ TTP. Plan for OR today for robotic, possible open, cholecystectomy and all indicated procedures. The procedure, alternatives, risks were discussed with the patient and all questions were answered. The consent was signed, witnessed, placed in the patient's chart. NPO, IVF with LR to 100/hr  Hold AC. Electronically signed by Tay Garcia DO  on 2/5/2023 at 5:01 PM     Attending Physician Statement  I have discussed the case with Dr Jean-Claude Roblero, including pertinent history and exam findings with the resident. I have seen and examined the patient and the key elements of the encounter have been performed by me. I agree with the assessment, plan and orders as documented by the resident.       Electronically signed by Erlinda Mendes IV, DO  on 2/6/2023 at 8:48 AM

## 2023-02-06 ENCOUNTER — ANESTHESIA EVENT (OUTPATIENT)
Dept: OPERATING ROOM | Age: 78
DRG: 445 | End: 2023-02-06
Payer: COMMERCIAL

## 2023-02-06 ENCOUNTER — ANESTHESIA (OUTPATIENT)
Dept: OPERATING ROOM | Age: 78
DRG: 445 | End: 2023-02-06
Payer: COMMERCIAL

## 2023-02-06 ENCOUNTER — APPOINTMENT (OUTPATIENT)
Dept: NUCLEAR MEDICINE | Age: 78
DRG: 445 | End: 2023-02-06
Attending: INTERNAL MEDICINE
Payer: COMMERCIAL

## 2023-02-06 VITALS
TEMPERATURE: 97.9 F | BODY MASS INDEX: 20.83 KG/M2 | WEIGHT: 125 LBS | RESPIRATION RATE: 16 BRPM | HEART RATE: 47 BPM | DIASTOLIC BLOOD PRESSURE: 40 MMHG | SYSTOLIC BLOOD PRESSURE: 116 MMHG | OXYGEN SATURATION: 98 % | HEIGHT: 65 IN

## 2023-02-06 PROBLEM — K81.0 ACUTE CHOLECYSTITIS: Status: ACTIVE | Noted: 2023-02-06

## 2023-02-06 PROBLEM — K81.1 CHRONIC CHOLECYSTITIS: Status: ACTIVE | Noted: 2023-02-05

## 2023-02-06 LAB
ABSOLUTE EOS #: 0.05 K/UL (ref 0–0.44)
ABSOLUTE IMMATURE GRANULOCYTE: 0.03 K/UL (ref 0–0.3)
ABSOLUTE LYMPH #: 2.05 K/UL (ref 1.1–3.7)
ABSOLUTE MONO #: 0.67 K/UL (ref 0.1–1.2)
ALBUMIN SERPL-MCNC: 3.5 G/DL (ref 3.5–5.2)
ALP SERPL-CCNC: 81 U/L (ref 35–104)
ALT SERPL-CCNC: 45 U/L (ref 5–33)
ANION GAP SERPL CALCULATED.3IONS-SCNC: 10 MMOL/L (ref 9–17)
AST SERPL-CCNC: 45 U/L
BASOPHILS # BLD: 1 % (ref 0–2)
BASOPHILS ABSOLUTE: 0.06 K/UL (ref 0–0.2)
BILIRUB SERPL-MCNC: 1 MG/DL (ref 0.3–1.2)
BUN SERPL-MCNC: 17 MG/DL (ref 8–23)
BUN/CREAT BLD: 15 (ref 9–20)
CALCIUM SERPL-MCNC: 8.7 MG/DL (ref 8.6–10.4)
CHLORIDE SERPL-SCNC: 100 MMOL/L (ref 98–107)
CO2 SERPL-SCNC: 25 MMOL/L (ref 20–31)
CREAT SERPL-MCNC: 1.15 MG/DL (ref 0.5–0.9)
EOSINOPHILS RELATIVE PERCENT: 1 % (ref 1–4)
GFR SERPL CREATININE-BSD FRML MDRD: 48 ML/MIN/1.73M2
GLUCOSE SERPL-MCNC: 95 MG/DL (ref 70–99)
HCT VFR BLD AUTO: 32.3 % (ref 36.3–47.1)
HGB BLD-MCNC: 10.6 G/DL (ref 11.9–15.1)
IMMATURE GRANULOCYTES: 0 %
LYMPHOCYTES # BLD: 23 % (ref 24–43)
MAGNESIUM SERPL-MCNC: 2 MG/DL (ref 1.6–2.6)
MCH RBC QN AUTO: 31.6 PG (ref 25.2–33.5)
MCHC RBC AUTO-ENTMCNC: 32.8 G/DL (ref 28.4–34.8)
MCV RBC AUTO: 96.4 FL (ref 82.6–102.9)
MONOCYTES # BLD: 8 % (ref 3–12)
NRBC AUTOMATED: 0 PER 100 WBC
PDW BLD-RTO: 12.5 % (ref 11.8–14.4)
PLATELET # BLD AUTO: 239 K/UL (ref 138–453)
PMV BLD AUTO: 8.8 FL (ref 8.1–13.5)
POTASSIUM SERPL-SCNC: 3.9 MMOL/L (ref 3.7–5.3)
PROT SERPL-MCNC: 6 G/DL (ref 6.4–8.3)
RBC # BLD: 3.35 M/UL (ref 3.95–5.11)
SEG NEUTROPHILS: 67 % (ref 36–65)
SEGMENTED NEUTROPHILS ABSOLUTE COUNT: 6 K/UL (ref 1.5–8.1)
SODIUM SERPL-SCNC: 135 MMOL/L (ref 135–144)
WBC # BLD AUTO: 8.9 K/UL (ref 3.5–11.3)

## 2023-02-06 PROCEDURE — 2500000003 HC RX 250 WO HCPCS

## 2023-02-06 PROCEDURE — 96375 TX/PRO/DX INJ NEW DRUG ADDON: CPT

## 2023-02-06 PROCEDURE — A9537 TC99M MEBROFENIN: HCPCS | Performed by: STUDENT IN AN ORGANIZED HEALTH CARE EDUCATION/TRAINING PROGRAM

## 2023-02-06 PROCEDURE — 3430000000 HC RX DIAGNOSTIC RADIOPHARMACEUTICAL: Performed by: STUDENT IN AN ORGANIZED HEALTH CARE EDUCATION/TRAINING PROGRAM

## 2023-02-06 PROCEDURE — 2580000003 HC RX 258: Performed by: STUDENT IN AN ORGANIZED HEALTH CARE EDUCATION/TRAINING PROGRAM

## 2023-02-06 PROCEDURE — 96366 THER/PROPH/DIAG IV INF ADDON: CPT

## 2023-02-06 PROCEDURE — 80053 COMPREHEN METABOLIC PANEL: CPT

## 2023-02-06 PROCEDURE — 85025 COMPLETE CBC W/AUTO DIFF WBC: CPT

## 2023-02-06 PROCEDURE — 36415 COLL VENOUS BLD VENIPUNCTURE: CPT

## 2023-02-06 PROCEDURE — 83735 ASSAY OF MAGNESIUM: CPT

## 2023-02-06 PROCEDURE — 6360000002 HC RX W HCPCS: Performed by: NURSE PRACTITIONER

## 2023-02-06 PROCEDURE — G0378 HOSPITAL OBSERVATION PER HR: HCPCS

## 2023-02-06 PROCEDURE — 99238 HOSP IP/OBS DSCHRG MGMT 30/<: CPT | Performed by: INTERNAL MEDICINE

## 2023-02-06 PROCEDURE — 2580000003 HC RX 258: Performed by: NURSE PRACTITIONER

## 2023-02-06 PROCEDURE — 6370000000 HC RX 637 (ALT 250 FOR IP): Performed by: NURSE PRACTITIONER

## 2023-02-06 PROCEDURE — 6360000004 HC RX CONTRAST MEDICATION: Performed by: STUDENT IN AN ORGANIZED HEALTH CARE EDUCATION/TRAINING PROGRAM

## 2023-02-06 PROCEDURE — 78227 HEPATOBIL SYST IMAGE W/DRUG: CPT

## 2023-02-06 PROCEDURE — 96367 TX/PROPH/DG ADDL SEQ IV INF: CPT

## 2023-02-06 RX ORDER — INDOCYANINE GREEN AND WATER 25 MG
5 KIT INJECTION ONCE
Status: COMPLETED | OUTPATIENT
Start: 2023-02-06 | End: 2023-02-06

## 2023-02-06 RX ORDER — FUROSEMIDE 20 MG/1
20 TABLET ORAL EVERY OTHER DAY
COMMUNITY

## 2023-02-06 RX ORDER — SODIUM CHLORIDE, SODIUM LACTATE, POTASSIUM CHLORIDE, CALCIUM CHLORIDE 600; 310; 30; 20 MG/100ML; MG/100ML; MG/100ML; MG/100ML
INJECTION, SOLUTION INTRAVENOUS CONTINUOUS
Status: DISCONTINUED | OUTPATIENT
Start: 2023-02-06 | End: 2023-02-06 | Stop reason: HOSPADM

## 2023-02-06 RX ORDER — FAMOTIDINE 20 MG/1
20 TABLET, FILM COATED ORAL DAILY
Qty: 60 TABLET | Refills: 3 | Status: SHIPPED
Start: 2023-02-06

## 2023-02-06 RX ADMIN — METOPROLOL SUCCINATE 50 MG: 50 TABLET, EXTENDED RELEASE ORAL at 08:25

## 2023-02-06 RX ADMIN — SODIUM CHLORIDE, POTASSIUM CHLORIDE, SODIUM LACTATE AND CALCIUM CHLORIDE: 600; 310; 30; 20 INJECTION, SOLUTION INTRAVENOUS at 07:58

## 2023-02-06 RX ADMIN — PIPERACILLIN AND TAZOBACTAM 3375 MG: 3; .375 INJECTION, POWDER, FOR SOLUTION INTRAVENOUS at 15:35

## 2023-02-06 RX ADMIN — SODIUM CHLORIDE 1.13 MCG: 9 INJECTION, SOLUTION INTRAVENOUS at 12:26

## 2023-02-06 RX ADMIN — SACUBITRIL AND VALSARTAN 1 TABLET: 24; 26 TABLET, FILM COATED ORAL at 08:25

## 2023-02-06 RX ADMIN — SODIUM CHLORIDE 1.13 MCG: 9 INJECTION, SOLUTION INTRAVENOUS at 14:20

## 2023-02-06 RX ADMIN — PIPERACILLIN AND TAZOBACTAM 3375 MG: 3; .375 INJECTION, POWDER, FOR SOLUTION INTRAVENOUS at 06:35

## 2023-02-06 RX ADMIN — INDOCYANINE GREEN AND WATER 5 MG: KIT at 10:44

## 2023-02-06 RX ADMIN — Medication 4 MILLICURIE: at 13:15

## 2023-02-06 NOTE — PROGRESS NOTES
[facility]  Office: [address], DO, Jossie Ewing, DO, Klaudia Olsen, DO, Edmond Butterfield Blood, DO, Herb Angelucci, MD, Luz Elena Sanders MD, Thomas Da Silva MD, Pushpa Mc MD,  Ebony Mckeon MD, Gualberto White MD, Meng Schwarz, DO, Pam Arreguin MD,  Liam Vela MD, Everton Kendall MD, Ethan Montaño, DO, Armond Salazar MD, Tato Jaquez MD, Lisa Stevens, DO, James Ng MD, Mitchell Weathers MD, Alice Urbano MD, Ruma Yung MD, Chetan Encarnacion, DO, Ashlie Arnold MD, Laureen Cabral MD, Phoenix Mahajan, CNP,  Roxanna Babinski, CNP, Celia Pate, CNP, Azam Gibbs, CNP,  Audra Bell, [facility], Marnie Diez, CNP, Jenny Delcid, CNP, Darrell Haynes, CNP, Jessica Jessica, CNP, Adam Pereira, CNP, Alex Munoz PAMinC, Vinay Rojas, CNS, Nancy Ayala, CNP, Maryana Busby, [facility]    Progress Note    2/6/2023    1:54 PM    Name:   Gabriela Mendez  MRN:     [id number]     Acct:      [de-identified]   Room:   2017/2017-02   Day:  1  Admit Date:  2/5/2023  7:15 PM    PCP:   Kylah Figueroa MD  Code Status:  Full Code    Subjective:     C/C: abd pain  Interval History Status: improved. Feels better today  Denies n/v/abd pain/cp/sob    Brief History:     Per my DWIGHT:  \"Patient presented to [facility] earlier today with complaints of epigastric pain/heartburn after eating breakfast this morning. Patient states her symptoms started around 8 AM.  Along with the epigastric pain patient was also experiencing nausea and vomiting. Denies any recent fevers, chills, chest pain, shortness of breath, and diarrhea. Patient has a significant past medical history of CAD, arthritis, hyperlipidemia, hypertension, hypothyroidism and is hard of hearing. Throughout her emergency room evaluation it was noted that her total bilirubin was 1.5. Troponin I was 0.12. Chloride 97. Glucose 132. WBC 13.1.      CT abdomen and pelvis shows: Gallbladder distention. Correlate for signs of acute cholecystitis\"    Review of Systems:     Constitutional:  negative for chills, fevers, sweats  Respiratory:  negative for cough, dyspnea on exertion, shortness of breath, wheezing  Cardiovascular:  negative for chest pain, chest pressure/discomfort, lower extremity edema, palpitations  Gastrointestinal:  negative for abdominal pain, constipation, diarrhea, nausea, vomiting  Neurological:  negative for dizziness, headache    Medications: Allergies:  No Known Allergies    Current Meds:   Scheduled Meds:    sacubitril-valsartan  1 tablet Oral BID    pantoprazole  40 mg Oral QAM AC    metoprolol succinate  50 mg Oral Daily    levothyroxine  100 mcg Oral Daily    bumetanide  0.5 mg Oral Daily    sodium chloride flush  5-40 mL IntraVENous 2 times per day    piperacillin-tazobactam  3,375 mg IntraVENous Q8H     Continuous Infusions:    lactated ringers IV soln 100 mL/hr at 02/06/23 0758    sodium chloride       PRN Meds: sodium chloride flush, sodium chloride, potassium chloride **OR** potassium alternative oral replacement **OR** potassium chloride, magnesium sulfate, ondansetron **OR** ondansetron, acetaminophen **OR** acetaminophen, polyethylene glycol, morphine    Data:     Past Medical History:   has a past medical history of Arthritis, CAD (coronary artery disease), Elevated liver enzymes, Esophageal reflux, Foot drop, right foot, Heart attack ([address] History of Clostridium difficile infection, HL (hearing loss), Hyperlipidemia, Hypertension, Hypothyroidism, Lumbar radiculopathy, Osteoarthritis, Osteopenia, Psychiatric problem, and Tremor. Social History:   reports that she quit smoking about 16 years ago. She has never used smokeless tobacco. She reports current alcohol use. She reports that she does not use drugs.      Family History:   Family History   Problem Relation Age of Onset    Heart Disease Mother     Heart Disease Father Arthritis Father     Heart Disease Brother        Vitals:  BP (!) 122/44   Pulse 57   Temp 98.1 °F (36.7 °C) (Oral)   Resp 16   Ht 5' 5\" (1.651 m)   Wt 125 lb (56.7 kg)   SpO2 97%   BMI 20.80 kg/m²   Temp (24hrs), Av.1 °F (36.7 °C), Min:97.8 °F (36.6 °C), Max:98.3 °F (36.8 °C)    No results for input(s): POCGLU in the last 72 hours. I/O (24Hr): Intake/Output Summary (Last 24 hours) at 2023 1354  Last data filed at 2023 0553  Gross per 24 hour   Intake 170.28 ml   Output --   Net 170.28 ml       Labs:  Hematology:  Recent Labs     23  0613   WBC 8.9   RBC 3.35*   HGB 10.6*   HCT 32.3*   MCV 96.4   MCH 31.6   MCHC 32.8   RDW 12.5      MPV 8.8     Chemistry:  Recent Labs     23  0613      K 3.9      CO2 25   GLUCOSE 95   BUN 17   CREATININE 1.15*   MG 2.0   ANIONGAP 10   LABGLOM 48*   CALCIUM 8.7     Recent Labs     23  0613   PROT 6.0*   LABALBU 3.5   AST 45*   ALT 45*   ALKPHOS 81   BILITOT 1.0     ABG:No results found for: POCPH, PHART, PH, POCPCO2, DMN6UZP, PCO2, POCPO2, PO2ART, PO2, POCHCO3, DAL7XOH, HCO3, NBEA, PBEA, BEART, BE, THGBART, THB, HIN9GPS, LOZS1YQH, X0CGTNIA, O2SAT, FIO2  Lab Results   Component Value Date/Time    SPECIAL NOT REPORTED 2021 11:31 PM     Lab Results   Component Value Date/Time    CULTURE NO SIGNIFICANT GROWTH 2021 11:31 PM       Radiology:  No results found.     Physical Examination:        General appearance:  alert, cooperative and no distress  Mental Status:  oriented to person, place and time and normal affect  Lungs:  clear to auscultation bilaterally, normal effort  Heart:  regular rate and rhythm, no murmur  Abdomen:  soft, minimally ttp with deep palpation to ruq, nondistended, normal bowel sounds, no masses, hepatomegaly, splenomegaly  Extremities:  no edema, redness, tenderness in the calves  Skin:  no gross lesions, rashes, induration    Assessment:        Hospital Problems             Last Modified POA    * (Principal) Acute cholecystitis 2/5/2023 Yes    Essential hypertension 2/5/2023 Yes    Acquired hypothyroidism 2/5/2023 Yes    Right foot drop 2/5/2023 Yes    Primary osteoarthritis involving multiple joints 2/5/2023 Yes    Troponin level elevated 2/5/2023 Yes    Chronic diastolic congestive heart failure ([facility] [address] 2/5/2023 Yes       Plan:         For hida scan today-if negative, dc home  If positive, would need deborah tube if she agrees to it  D/w son    Boubacar Haines, DO  2/6/2023  1:54 PM

## 2023-02-06 NOTE — CARE COORDINATION
Case Management Initial Discharge Plan  Ron Mullen,             Met with:patient to discuss discharge plans. Information verified: address, contacts, phone number, , insurance Yes  PCP: Pinky Law MD  Date of last visit: 2022    Insurance Provider: [facility]    Discharge Planning    Living Arrangements:  Alone   Support Systems:  Children, Family Members, Friends/Neighbors    Home has 1st story apt  2 stairs to climb to get into front door, 0 stairs to climb to reach second floor  Location of bedroom/bathroom in home  - main floor    Patient able to perform ADL's:Independent    Current Services (outpatient & in home) none  DME equipment: walker, shower chair  DME provider: N/A    Pharmacy: [facility] on [address]    Potential Assistance Needed:  Home Care    Patient agreeable to home care: Yes  Freedom of choice provided:  yes    Prior SNF/Rehab Placement and Facility: No  Agreeable to SNF/Rehab: No  [place] of choice provided: n/a   Evaluation: n/a    Expected Discharge date:     Patient expects to be discharged to:   home  Follow Up Appointment: Best Day/ Time:      Transportation provider: friend  Transportation arrangements needed for discharge: No    Readmission Risk              Risk of Unplanned Readmission:  10             Does patient have a readmission risk score greater than 14?: No  If yes, follow-up appointment must be made within 7 days of discharge. Goal of Care:       Discharge Plan: Met with patient at bedside. Lives alone in 1st story apt, independent and drives. Has walker and shower chair at home. Appears forgetful at times. Admitted for cholecystitis. Surgery and cardiology consulted for pre op evaluation. Scheduled for cholecystectomy today at 11:45. Follow post op for [facility].                    Electronically signed by Armand Riddle RN on 23 at 9:10 AM EST - Admitted to  nursery for routine  care  - Erythromycin eye drops, vitamin K, and hepatitis B vaccine  - CCHD screening & EOAE screening  - Encourage mother/baby interaction & breast feeding  - Monitor for jaundice; bilirubin prior to d/c or sooner if concerns

## 2023-02-06 NOTE — FLOWSHEET NOTE
Patient has $100 in cash and 6 rings and necklace in her purse placed in bedside dresser.  Per pt and son both declined to have security lock in safe at this time although writer encouraged this

## 2023-02-06 NOTE — PROGRESS NOTES
Patient and son decided for patient not to have [facility] scheduled for today.  Surgery cancelled

## 2023-02-06 NOTE — PROGRESS NOTES
Pt admitted to room #2017 from [facility] ER. Oriented to room and call light/tv controls. Bed in lowest position, wheels locked, 2/4 side rails up  Call light in reach, room free of clutter, adequate lighting provided.

## 2023-02-06 NOTE — PROGRESS NOTES
Transitions of Care Pharmacy Service   Medication Review    The patient's list of current home medications was reviewed with her earlier today prior to discharge; this note documents that encounter. The list below reflects what she was taking prior to admission and does not reflect any changes that were pending for discharge. Source(s) of information: patient, Epic, Surescripts refill report, cardiologist office (Dr Low Rm      Please feel free to call me with any questions about this encounter. Thank you.     Spenser Koenig, [facility]   Transitions of Care Pharmacy Service  Phone:  [phone]  Fax: [phone]      Electronically signed by Spenser Koenig [facility] on 2/6/2023 at 6:51 PM       Prior to Admission medications    Medication Sig       furosemide (LASIX) 20 MG tablet Take 20 mg by mouth every other day   traZODone (DESYREL) 50 MG tablet TAKE ONE TABLET BY MOUTH ONCE NIGHTLY   primidone (MYSOLINE) 50 MG tablet TAKE ONE TABLET BY MOUTH THREE TIMES A DAY   pantoprazole (PROTONIX) 40 MG tablet TAKE ONE TABLET BY MOUTH EVERY MORNING BEFORE BREAKFAST   levothyroxine (SYNTHROID) 100 MCG tablet TAKE ONE TABLET BY MOUTH DAILY   metoprolol succinate (TOPROL XL) 50 MG extended release tablet Take 1 tablet by mouth daily   atorvastatin (LIPITOR) 40 MG tablet Take 1 tablet by mouth daily                   spironolactone (ALDACTONE) 25 MG tablet Take 25 mg by mouth daily   sacubitril-valsartan (ENTRESTO) 24-26 MG per tablet Take 1 tablet by mouth 2 times daily   diclofenac sodium (VOLTAREN) 1 % GEL Apply 4 g topically every 4 hours as needed for Pain       aspirin 81 MG tablet Take 81 mg by mouth daily   Multiple Vitamins-Minerals (CENTRUM SILVER PO) Take 1 tablet by mouth daily

## 2023-02-06 NOTE — DISCHARGE SUMMARY
[facility]  Office: [address], DO, Yadi Gilmore, DO, Hue Calvillo, DO, Nomi Haines, DO, Skip Aggarwal MD, Daniel Lackey MD, Karley Bhatt MD, Mark Garvey MD,  Marycruz Andrea MD, Danielle Paul MD, Gerardo Rodriguez DO, Marsha Arnold MD,  Pramod Perkins MD, Caden Palma MD, Candice Ricks DO, Virginie Bellamy MD, Vania Hayes MD, Philippe Perry DO, Gina Parisi MD, Bear Jansen MD, Theresa Deutsch MD, Ronna Do MD, Kaleb Trimble DO, Beatris Valentine MD, Cheryl Curtis MD, Amarilys Ruano, [facility],  Abraham Guerrero, CNP, Lee Hunt, CNP, Jose Armando Brown, CNP,  Renee Monterroso, [facility], Mike Jones, CNP, Alix Gaxiola, CNP, Kathyrn Harada, CNP, Antonio Dubose, CNP, Chau Mendieta, CNP, Brent Crum PA-C, Vickie Hernandez, CNS, Maritza Crawford, CNP, Suzanne Maxwell, [facility]    Discharge Summary     Patient ID: Becky Resendiz  :  1945   MRN: [id number]     ACCOUNT:  [de-identified]   Patient's PCP: Jerelene Bosworth, MD  Admit Date: 2023   Discharge Date: 2023     Length of Stay: 1  Code Status:  Full Code  Admitting Physician: Sundeep Solorio DO  Discharge Physician: Shaka Haines DO     Active Discharge Diagnoses:     Hospital Problem Lists:  Principal Problem:    Chronic cholecystitis  Active Problems:    Acute cholecystitis    Essential hypertension    Acquired hypothyroidism    Right foot drop    Primary osteoarthritis involving multiple joints    Troponin level elevated    Chronic diastolic congestive heart failure [facility])  Resolved Problems:    * No resolved hospital problems. *      Admission Condition:  fair     Discharged Condition: stable    Hospital Stay:     Hospital Course:  Becky Resendiz is a 76 y.o. female who was admitted for the management of  Chronic cholecystitis , presented to ER with No chief complaint on file.     Admitted from outside hospital with concerns about acute cholecystitis based on ct abdomen. Seen by surgery, placed on iv zosyn. Symptoms mild at this hospital.  Seen by cardio for preop clearance for cholecystectomy. Deemed to be mod-high risk and based on this, patient/family wanted to avoid surgery if at all possible. Therefore, surgery ordered hida scan, which suggested chronic rather than acute cholecystitis. Since she did not have acute gb disease, surgery advanced diet and said to follow up prn. Should she become symptomatic again, would need deborah tube-indefinitely      Significant therapeutic interventions: see above    Significant Diagnostic Studies:   Labs / Micro:  CBC:   Lab Results   Component Value Date/Time    WBC 8.9 02/06/2023 06:13 AM    RBC 3.35 02/06/2023 06:13 AM    HGB 10.6 02/06/2023 06:13 AM    HCT 32.3 02/06/2023 06:13 AM    MCV 96.4 02/06/2023 06:13 AM    MCH 31.6 02/06/2023 06:13 AM    MCHC 32.8 02/06/2023 06:13 AM    RDW 12.5 02/06/2023 06:13 AM     02/06/2023 06:13 AM     CMP:    Lab Results   Component Value Date/Time    GLUCOSE 95 02/06/2023 06:13 AM     02/06/2023 06:13 AM    K 3.9 02/06/2023 06:13 AM     02/06/2023 06:13 AM    CO2 25 02/06/2023 06:13 AM    BUN 17 02/06/2023 06:13 AM    CREATININE 1.15 02/06/2023 06:13 AM    ANIONGAP 10 02/06/2023 06:13 AM    ALKPHOS 81 02/06/2023 06:13 AM    ALT 45 02/06/2023 06:13 AM    AST 45 02/06/2023 06:13 AM    BILITOT 1.0 02/06/2023 06:13 AM    LABALBU 3.5 02/06/2023 06:13 AM    ALBUMIN NOT REPORTED 12/20/2021 10:03 PM    LABGLOM 48 02/06/2023 06:13 AM    GFRAA >60 04/01/2022 02:05 PM    GFR      04/01/2022 02:05 PM    PROT 6.0 02/06/2023 06:13 AM    CALCIUM 8.7 02/06/2023 06:13 AM        Radiology:  NM HEPATOBILIARY SCAN W EJECTION FRACTION    Result Date: 2/6/2023  Gallbladder and small bowel are visualized in a timely fashion however abnormally low ejection fraction of 4% compared to 80% previously.        Consultations:    Consults:     Final Specialist Recommendations/Findings:   IP CONSULT TO GENERAL SURGERY  IP CONSULT TO CARDIOLOGY      The patient was seen and examined on day of discharge and this discharge summary is in conjunction with any daily progress note from day of discharge. Discharge plan:     Disposition: Home    Physician Follow Up:     MD Ninoska Pfeiffer [address] [id number] [facility] [address] [phone]    Follow up in 1 week(s)      [facility], DO  [address] [place] [id number] [phone]    Follow up  As needed       Requiring Further Evaluation/Follow Up POST HOSPITALIZATION/Incidental Findings: consider deborah tube if becomes symptomatic    Consider reducing aspirin dose-she takes 325mg bid    Diet: low fat, low cholesterol diet    Activity: As tolerated    Instructions to Patient: take medications as prescribed      Discharge Medications:      Medication List        CHANGE how you take these medications      famotidine 20 MG tablet  Commonly known as: PEPCID  Take 1 tablet by mouth daily  What changed: when to take this            CONTINUE taking these medications      aspirin 81 MG tablet     atorvastatin 40 MG tablet  Commonly known as: LIPITOR  Take 1 tablet by mouth daily     CENTRUM SILVER PO     diclofenac sodium 1 % Gel  Commonly known as: Voltaren  Apply 4 g topically every 4 hours as needed for Pain     Entresto 24-26 MG per tablet  Generic drug: sacubitril-valsartan     furosemide 20 MG tablet  Commonly known as: LASIX     levothyroxine 100 MCG tablet  Commonly known as: SYNTHROID  TAKE ONE TABLET BY MOUTH DAILY     metoprolol succinate 50 MG extended release tablet  Commonly known as: TOPROL XL  Take 1 tablet by mouth daily     nitroGLYCERIN 0.4 MG SL tablet  Commonly known as: NITROSTAT  up to max of 3 total doses. If no relief after 1 dose, call 911.      pantoprazole 40 MG tablet  Commonly known as: PROTONIX  TAKE ONE TABLET BY MOUTH EVERY MORNING BEFORE BREAKFAST     primidone 50 MG tablet  Commonly known as: MYSOLINE  TAKE ONE TABLET BY MOUTH THREE TIMES A DAY     spironolactone 25 MG tablet  Commonly known as: ALDACTONE     traZODone 50 MG tablet  Commonly known as: DESYREL  TAKE ONE TABLET BY MOUTH ONCE NIGHTLY            STOP taking these medications      bumetanide 0.5 MG tablet  Commonly known as: BUMEX     triamcinolone 0.025 % cream  Commonly known as: KENALOG               Where to Get Your Medications        Information about where to get these medications is not yet available    Ask your nurse or doctor about these medications  famotidine 20 MG tablet         No discharge procedures on file. Time Spent on discharge is  20 mins in patient examination, evaluation, counseling as well as medication reconciliation, prescriptions for required medications, discharge plan and follow up. Electronically signed by   Ranjeet Haines DO  2/6/2023  4:50 PM      Thank you Dr. Morenita Oconnor MD for the opportunity to be involved in this patient's care.

## 2023-02-06 NOTE — PROGRESS NOTES
Writer reviewed discharge instructions with patient and son. They verbalized understanding signature obtained.  Patient sent home with belongings, no scripts

## 2023-02-06 NOTE — PLAN OF CARE
Problem: Discharge Planning  Goal: Discharge to home or other facility with appropriate resources  Outcome: Progressing  Flowsheets (Taken 2/5/2023 2225)  Discharge to home or other facility with appropriate resources:   Identify barriers to discharge with patient and caregiver   Arrange for needed discharge resources and transportation as appropriate   Identify discharge learning needs (meds, wound care, etc)   Arrange for interpreters to assist at discharge as needed   Refer to discharge planning if patient needs post-hospital services based on physician order or complex needs related to functional status, cognitive ability or social support system     Problem: Chronic Conditions and Co-morbidities  Goal: Patient's chronic conditions and co-morbidity symptoms are monitored and maintained or improved  Outcome: Progressing  Flowsheets (Taken 2/5/2023 1945)  Care Plan - Patient's Chronic Conditions and Co-Morbidity Symptoms are Monitored and Maintained or Improved:   Monitor and assess patient's chronic conditions and comorbid symptoms for stability, deterioration, or improvement   Collaborate with multidisciplinary team to address chronic and comorbid conditions and prevent exacerbation or deterioration   Update acute care plan with appropriate goals if chronic or comorbid symptoms are exacerbated and prevent overall improvement and discharge     Problem: Skin/Tissue Integrity  Goal: Absence of new skin breakdown  Description: 1. Monitor for areas of redness and/or skin breakdown  2. Assess vascular access sites hourly  3. Every 4-6 hours minimum:  Change oxygen saturation probe site  4. Every 4-6 hours:  If on nasal continuous positive airway pressure, respiratory therapy assess nares and determine need for appliance change or resting period.   Outcome: Progressing     Problem: Safety - Adult  Goal: Free from fall injury  Outcome: Progressing     Problem: ABCDS Injury Assessment  Goal: Absence of physical injury  Outcome: Progressing     Problem: Pain  Goal: Verbalizes/displays adequate comfort level or baseline comfort level  Outcome: Progressing

## 2023-02-06 NOTE — H&P
[facility]  Office: [address], DO, Jessica Ramirez, DO, Esther Aragon, DO, Bibiana Cortez Blood, DO, Maggy Whatley MD, Frederick Rowan MD, Starr Rios MD, Howard Toscano MD,  Hailey Orozco MD, Naomi Hannah MD, Baldo Camp, DO, Jose Armando De La Rosa MD,  Yasmani Starr MD, Simran Irvin MD, Brandy Torres, DO, Charles Kiser MD, Andie Kaiser MD, Lydia Kelley, DO, Mehdi Johnson MD, Adam Black MD, Hans Bobby MD, Lulu Obrien MD, Vidhya Harper, DO, Alcira Griffin MD, Varinder Hsieh MD, Ninfa Rangel, CNP,  Cecilia Dandy, CNP, Evelyn Cisse, CNP, Bhavik Brown, CNP,  Akua Dill, [facility], Gerald Rodriges, CNP, Randy Morales, CNP, Daryle Jurist, CNP, Tenisha Blair, CNP, Miguelina Francisco, CNP, Bonifacio Hernandez PA-C, Ignacia Tony, CNS, Gray Bryson, CNP, Sabrina Lawson, [facility]    HISTORY AND PHYSICAL EXAMINATION            Date:   2/5/2023  Patient name:  Vy Brown  Date of admission:  2/5/2023  7:15 PM  MRN:   [id number]  Account:  [de-identified]  YOB: 1945  PCP:    Criss Rowell MD  Room:   2017/2017-02  Code Status:    Full Code    Chief Complaint:     Epigastric pain    History Obtained From:     patient, family member -son, electronic medical record    History of Present Illness:     Patient presented to [facility] earlier today with complaints of epigastric pain/heartburn after eating breakfast this morning. Patient states her symptoms started around 8 AM.  Along with the epigastric pain patient was also experiencing nausea and vomiting. Denies any recent fevers, chills, chest pain, shortness of breath, and diarrhea. Patient has a significant past medical history of CAD, arthritis, hyperlipidemia, hypertension, hypothyroidism and is hard of hearing. Throughout her emergency room evaluation it was noted that her total bilirubin was 1.5. Troponin I was 0.12. Chloride 97. Glucose 132. WBC 13.1. CT abdomen and pelvis shows: Gallbladder distention. Correlate for signs of acute cholecystitis    Nonobstructive bowel gas pattern with no other acute intra-abdominal pelvic abnormality within the limitations of noncontrast CT evaluation in this patient. Chest x-ray shows: No definitive acute pulmonary process. Patient was given 4.5 g of Zosyn in ED. Past Medical History:     Past Medical History:   Diagnosis Date    Arthritis     CAD (coronary artery disease)     Elevated liver enzymes     Esophageal reflux     Foot drop, right foot     Heart attack ([address]     patient does not know when    History of Clostridium difficile infection     treated on 08/12/17    HL (hearing loss)     Hyperlipidemia     Hypertension     Hypothyroidism     Lumbar radiculopathy     Osteoarthritis     Osteopenia     Psychiatric problem     Tremor     hand        Past Surgical History:     Past Surgical History:   Procedure Laterality Date    BLADDER SUSPENSION      CARDIAC CATHETERIZATION  01/2015, 9/2017    COLONOSCOPY  2012    COLONOSCOPY  08/04/2017    PSEUDOMEMBRANOUS COLITIS WITH MUCOSAL ISCHEMIA, C DIFF POS    DILATION AND CURETTAGE OF UTERUS      JOINT REPLACEMENT Right [id number]    hip-complicated with rt drop foot-     JOINT REPLACEMENT Left 2017    hip-Dr. Iraida Melchor    SC COLONOSCOPY W/BIOPSY SINGLE/MULTIPLE N/A 8/4/2017    COLONOSCOPY WITH BIOPSY performed by Jillian Redd MD at [address] [place] [address] EGD TRANSORAL BIOPSY SINGLE/MULTIPLE N/A 8/4/2017    EGD BIOPSY performed by Jillian Redd MD at [facility] 03/07/2017    UPPER GASTROINTESTINAL ENDOSCOPY  08/04/2017    MILD CHRONIC GASTRITIS         Medications Prior to Admission:     Prior to Admission medications    Medication Sig Start Date End Date Taking?  Authorizing Provider   traZODone (DESYREL) 50 MG tablet TAKE ONE TABLET BY MOUTH ONCE NIGHTLY 1/26/23   [facility], SHANELLE - CNP   primidone (MYSOLINE) 50 MG tablet TAKE ONE TABLET BY MOUTH THREE TIMES A DAY 11/16/22   Prince Javad MD   pantoprazole (PROTONIX) 40 MG tablet TAKE ONE TABLET BY MOUTH EVERY MORNING BEFORE BREAKFAST 9/28/22   Prince Javad MD   levothyroxine (SYNTHROID) 100 MCG tablet TAKE ONE TABLET BY MOUTH DAILY 6/23/22   Prince Javad MD   famotidine (PEPCID) 20 MG tablet Take 1 tablet by mouth 2 times daily 4/5/22   Prince Javad MD   metoprolol succinate (TOPROL XL) 50 MG extended release tablet Take 1 tablet by mouth daily 4/5/22   Prince Javad MD   atorvastatin (LIPITOR) 40 MG tablet Take 1 tablet by mouth daily 4/5/22   Prince Javad MD   nitroGLYCERIN (NITROSTAT) 0.4 MG SL tablet up to max of 3 total doses. If no relief after 1 dose, call 911. 4/5/22   Prince Javad MD   tiZANidine (ZANAFLEX) 4 MG tablet TAKE ONE TABLET BY MOUTH FOUR TIMES A DAY AS NEEDED FOR MUSCLE SPASMS 4/4/22   Prince Javad MD   bumetanide (BUMEX) 0.5 MG tablet Take 1 tablet by mouth daily 12/3/21   Prince Javad MD   spironolactone (ALDACTONE) 25 MG tablet Take 25 mg by mouth daily    Historical Provider, MD   sacubitril-valsartan (ENTRESTO) 24-26 MG per tablet Take 1 tablet by mouth 2 times daily    Historical Provider, MD   diclofenac sodium (VOLTAREN) 1 % GEL Apply 4 g topically every 4 hours as needed for Pain 8/26/20   Prince Javad MD   triamcinolone (KENALOG) 0.025 % cream Apply 1 applicator topically 2 times daily Indications: on face Apply Topically    Historical Provider, MD   aspirin 81 MG tablet Take 325 mg by mouth 2 times daily     Historical Provider, MD   Multiple Vitamins-Minerals (CENTRUM SILVER PO) Take 1 tablet by mouth daily    Historical Provider, MD        Allergies:     Patient has no known allergies. Social History:     Tobacco:    reports that she quit smoking about 15 years ago.  She has never used smokeless tobacco.  Alcohol:      reports current alcohol use.  Drug Use:  reports no history of drug use. Family History:     Family History   Problem Relation Age of Onset    Heart Disease Mother     Heart Disease Father     Arthritis Father     Heart Disease Brother        Review of Systems:     Positive and Negative as described in HPI. Review of Systems   Constitutional:  Negative for activity change, chills, fatigue and fever. HENT:  Negative for congestion. Respiratory:  Negative for cough, chest tightness and shortness of breath. Cardiovascular: Negative. Gastrointestinal:  Positive for abdominal pain, nausea and vomiting. Negative for constipation and diarrhea. Endocrine: Negative for cold intolerance and polyuria. Genitourinary:  Negative for difficulty urinating. Musculoskeletal:  Positive for arthralgias. Negative for myalgias. Skin:  Negative for wound. Neurological:  Negative for dizziness and numbness. Psychiatric/Behavioral:  Negative for confusion. Physical Exam:   BP (!) 120/51   Pulse 58   Temp 97.8 °F (36.6 °C) (Oral)   Resp 16   SpO2 99%   Temp (24hrs), Av.8 °F (36.6 °C), Min:97.8 °F (36.6 °C), Max:97.8 °F (36.6 °C)    No results for input(s): POCGLU in the last 72 hours. No intake or output data in the 24 hours ending 23    Physical Exam  Vitals and nursing note reviewed. Constitutional:       General: She is not in acute distress. Appearance: Normal appearance. She is not ill-appearing. HENT:      Head: Normocephalic. Right Ear: Decreased hearing noted. Left Ear: Decreased hearing noted. Mouth/Throat:      Mouth: Mucous membranes are moist.   Eyes:      Pupils: Pupils are equal, round, and reactive to light. Cardiovascular:      Rate and Rhythm: Regular rhythm. Bradycardia present. Pulses: Normal pulses. Heart sounds: Normal heart sounds. No murmur heard. No friction rub. No gallop. Pulmonary:      Effort: Pulmonary effort is normal. No respiratory distress. Breath sounds: Normal breath sounds. No wheezing, rhonchi or rales. Abdominal:      General: Bowel sounds are normal. There is no distension. Palpations: Abdomen is soft. Tenderness: There is abdominal tenderness in the right upper quadrant. There is no guarding. Musculoskeletal:         General: No swelling. Normal range of motion. Cervical back: Normal range of motion. Skin:     General: Skin is warm and dry. Capillary Refill: Capillary refill takes less than 2 seconds. Neurological:      General: No focal deficit present. Mental Status: She is alert and oriented to person, place, and time. Psychiatric:         Mood and Affect: Mood normal.         Behavior: Behavior normal.         Thought Content: Thought content normal.         Judgment: Judgment normal.       Investigations:      Laboratory Testing:  No results found for this or any previous visit (from the past 24 hour(s)). Imaging/Diagnostics:  No results found.     Assessment :      Hospital Problems             Last Modified POA    * (Principal) Acute cholecystitis 2/5/2023 Yes    Essential hypertension 2/5/2023 Yes    Acquired hypothyroidism 2/5/2023 Yes    Right foot drop 2/5/2023 Yes    Primary osteoarthritis involving multiple joints 2/5/2023 Yes    Troponin level elevated 2/5/2023 Yes    Chronic diastolic congestive heart failure ([address] 2/5/2023 Yes     Plan:     Patient status inpatient in the  Med/Surge    Acute cholecystitis  General surgery consulted  Gentle IV hydration  IV antibiotics  Hypertension  Continue home medications  Hypothyroidism  Continue home medications  Elevated troponin  Noted to be chronically elevated  Chronic diastolic heart failure  Continue home medication  N.p.o. after midnight  Monitor a.m. labs    Consultations:   Dylon Mas 149    Patient is admitted as inpatient status because of co-morbidities listed above, severity of signs and symptoms as outlined, requirement for current medical therapies and most importantly because of direct risk to patient if care not provided in a hospital setting. Expected length of stay > 48 hours. On this date 2/5/2023 I have spent 29 minutes reviewing previous notes, test results and face to face with the patient discussing the diagnosis and importance of compliance with the treatment plan as well as documenting on the day of the visit. At least 50% of the time documented was spent with the patient to provide counseling and/or coordination of care.     SHANELLE Livingston - CNP  2/5/2023  8:55 PM    Copy sent to Dr. Jasen Tubbs MD

## 2023-02-06 NOTE — PROGRESS NOTES
Patient seen and examined at bedside. Noted cardiology risk stratification - patient is moderate to high risk for cholecystectomy due to comorbidities. Discussed risks/benefits of surgery with patient, son Slick Zelaya at bedside, and other son via phone as they are concerned about the risks considering her cardiac history. We reviewed management options including proceeding with surgery, watch and wait, percutaneous cholecystostomy tube, which will remain however if patient does not want any surgery. Due to moderate to high risk for surgery, patient and family like to avoid surgery at all costs. Likely patient has chronic cholecystitis; however, due to patient and family wishes of avoiding surgery, we will proceed with HIDA to rule out element of acute cholecystitis. If there is acute cholecystitis, we would recommend percutaneous cholecystostomy tube, but again this would remain forever as patient does not want any surgery. If HIDA is negative, patient may be discharged home on a low-fat diet with precautions on symptomatic cholelithiasis and return to the emergency department where we can have another discussion regarding surgery. Will follow up HIDA. Discussed with RN. Surgery cancelled for now.     Sudheer Overton D.O. PGY-5  Department of Surgery  2/6/2023, 11:53 AM

## 2023-02-06 NOTE — DISCHARGE INSTR - DIET

## 2023-02-06 NOTE — CONSULTS
Cardiovascular Consult Note     TODAY'S DATE: 2/6/2023    Patient name: Aisha Patino   YOB: 1945  Date of admission:  2/5/2023       Patient seen, examined. Previous clinical entries reviewed. All available laboratory, imaging and ancillary data reviewed. Reason for Consult: Pre-operative clearance      History of present Illness:     Aisha Patino is a 76 y.o. female with past medical history significant for coronary artery disease with stents in the past and nonrevascularizable disease, ischemic cardiomyopathy moderate to severe left ventricular systolic dysfunction and chronic systolic heart failure on appropriate heart failure medications who has been having symptoms of gallbladder disease and is being evaluated for potential cholecystectomy. Cardiology was consulted for preoperative evaluation and recommendations. She has a known history of nonrevascularizable coronary artery disease and moderate to severe left ventricular systolic dysfunction and has refused ICD placement in the past.  She has chronic heart failure symptoms which has been appropriately controlled with diuretics, Entresto, and metoprolol extended release. She denies any acute anginal episodes. She has no new symptoms of worsening heart failure including lower extremity edema, orthopnea or proximal nocturnal dyspnea. She is a [place] Heart Association functional class IIIa at baseline. Past Medical History:    has a past medical history of Arthritis, CAD (coronary artery disease), Elevated liver enzymes, Esophageal reflux, Foot drop, right foot, Heart attack ([address] History of Clostridium difficile infection, HL (hearing loss), Hyperlipidemia, Hypertension, Hypothyroidism, Lumbar radiculopathy, Osteoarthritis, Osteopenia, Psychiatric problem, and Tremor.     Surgical History:     Past Surgical History:   Procedure Laterality Date    BLADDER SUSPENSION      CARDIAC CATHETERIZATION  01/2015, 9/2017    COLONOSCOPY 2012    COLONOSCOPY  08/04/2017    PSEUDOMEMBRANOUS COLITIS WITH MUCOSAL ISCHEMIA, C DIFF POS    DILATION AND CURETTAGE OF UTERUS      JOINT REPLACEMENT Right 5575    hip-complicated with rt drop foot-     JOINT REPLACEMENT Left 2017    hip-Dr. Chacon Seen    [place] COLONOSCOPY W/BIOPSY SINGLE/MULTIPLE N/A 8/4/2017    COLONOSCOPY WITH BIOPSY performed by Janette Dior MD at [address] [place] [address] EGD TRANSORAL BIOPSY SINGLE/MULTIPLE N/A 8/4/2017    EGD BIOPSY performed by Janette Dior MD at [facility] 03/07/2017    UPPER GASTROINTESTINAL ENDOSCOPY  08/04/2017    MILD CHRONIC GASTRITIS        Medications:   Scheduled Meds:   indocyanine green  5 mg IntraVENous Once    sacubitril-valsartan  1 tablet Oral BID    pantoprazole  40 mg Oral QAM AC    metoprolol succinate  50 mg Oral Daily    levothyroxine  100 mcg Oral Daily    bumetanide  0.5 mg Oral Daily    sodium chloride flush  5-40 mL IntraVENous 2 times per day    piperacillin-tazobactam  3,375 mg IntraVENous Q8H     Continuous Infusions:   lactated ringers IV soln 100 mL/hr at 02/06/23 0758    sodium chloride        No outpatient medications have been marked as taking for the 2/5/23 encounter [facility] Encounter). Allergies:   Patient has no known allergies. Social History:    reports that she quit smoking about 16 years ago. She has never used smokeless tobacco. She reports current alcohol use. She reports that she does not use drugs. Family History:    family history includes Arthritis in her father; Heart Disease in her brother, father, and mother. Review of Systems:     Constitutional: No fever/chills. HENT: No headache, neck pain or neck stiffnes. No sore throat or dysphagia. Eyes: No blurred vision. Positive for hearing loss respiratory: As above. Cardiovascular: As above. Gastrointestinal: Positive for right upper quadrant abdominal pain.   Genitourinary: Negative Endocrine: Negative. Musculoskeletal: Negative. Skin: Negative. Allergic/Immunologic: Negative. Neurologic: Positive for memory loss. hematological: Negative. Psychiatric: Negative. All other systems are are noted to be otherwise negative. Physical Exam:   BP (!) 136/52   Pulse 55   Temp 98.3 °F (36.8 °C) (Oral)   Resp 16   Ht 5' 5\" (1.651 m)   Wt 125 lb (56.7 kg)   SpO2 95%   BMI 20.80 kg/m²     Intake/Output Summary (Last 24 hours) at 2/6/2023 0850  Last data filed at 2/6/2023 0553  Gross per 24 hour   Intake 170.28 ml   Output --   Net 170.28 ml       GENERAL:  Alert, appropriate, oriented, in NAD. HEENT:  Head is atraumatic and normocephalic. No Pallor. No icterus. NECK: Supple without any thyromegaly. LUNGS: Generally clear to auscultation  CARDIAC: S1, S2, RRR. ABD:  Soft non-tender . EXT: No edema. MS: No obvious deformities. SKIN: No obvious skin rashes. NEURO: No focal neurologic deficits. Labs/ Ancillary data:     CBC:   Recent Labs     02/06/23 0613   WBC 8.9   HGB 10.6*        BMP:    Recent Labs     02/06/23 0613      K 3.9      CO2 25   BUN 17   CREATININE 1.15*   GLUCOSE 95     Hepatic:   Recent Labs     02/06/23 0613   AST 45*   ALT 45*   BILITOT 1.0   ALKPHOS 81     Imaging:    Her last echocardiogram had shown mildly improved left ventricular ejection fraction close to 40%. Impression :     Moderate to high risk for elective cholecystectomy due to  -Chronic systolic heart failure  -Coronary artery disease with known revascularizable disease.  - Cardiomyopathy moderate to severe systolic dysfunction   - Currently clinically stable with her heart failure symptoms on medical management. ---- No clear acute reversible causes to reduce her cardiac risk. Nonspecifically elevated troponin-probably secondary to chronic heart failure and mild renal insufficiency   --No evidence of acute coronary syndrome.     Plan :     Restart metoprolol and Entresto. Judicious use of fluids pre-, darryn and postoperatively. Clarification with pharmacy regarding her current diuretic regimen. We will follow-up    Thank you very much for allowing us to participate in the care of this patient. Please call us with any questions.

## 2023-02-06 NOTE — FLOWSHEET NOTE
Pharmacy medication reconciliation services requested via:    [x] Perfect Serve Message sent to: Ray Hough Pharmacist  [] Phone call/message to [phone]  [] Order placed for Pharmacy Consult with 'Med Rec' in the order comments  [] Other       To Assist with Medication Reconciliation:   [x] Request made to Family member to bring medications into the hospital  [] Request made to Family member to call hospital with medication list  [] Message left with physician office  [] Request for medical records made to   [] Other

## 2023-02-06 NOTE — PROGRESS NOTES
PRIMARY CARE PHYSICIAN:  Dr. Silvia Conn.

 

CONSULTANT:  Dr. Vicente Beltran.

 

FINAL DIAGNOSIS:  New onset atrial fibrillation, now sinus rhythm.

 

SUMMARY:  An 81-year-old female with new onset atrial fibrillation.  Echocardiogram

showed ejection fraction is slightly low at 40%.  The patient is stable.  Dr. Beltran has

cleared the patient to go home with amiodarone 200 mg twice a day and Eliquis 2.5 mg

b.i.d.  The patient will discontinue on Norvasc.  The patient is otherwise stable.  She

is feeling fine.  No chest pain.  No shortness of breath.  No abdominal pain.  The

patient is stable, discharged home.  Follow up with Dr. Beltran in 1 week and Dr. Conn, her primary care physician in approximately 1 week. 

 

 

 

 

______________________________

MD HERMAN Evans/MODL

D:  09/26/2019 10:04:34

T:  09/27/2019 07:55:49

Job #:  [id number] Patient tolerated low fat diet without nausea or abd pain

## 2023-02-06 NOTE — ACP (ADVANCE CARE PLANNING)
Advance Care Planning     Advance Care Planning Activator (Inpatient)  Conversation Note      Date of ACP Conversation: 2/6/2023     Conversation Conducted with: Patient with Decision Making Capacity    ACP Activator: Lita Rosa RN    {When Decision Maker makes decisions on behalf of the incapacitated patient: Decision Maker is asked to consider and make decisions based on patient values, known preferences, or best interests. Health Care Decision Maker:     Current Designated Health Care Decision Maker:     Primary Decision Maker: Deena Hunt - Child - [phone]  Click here to complete Healthcare Decision Makers including section of the Healthcare Decision Maker Relationship (ie \"Primary\")      Care Preferences    Ventilation: \"If you were in your present state of health and suddenly became very ill and were unable to breathe on your own, what would your preference be about the use of a ventilator (breathing machine) if it were available to you? \"      Would the patient desire the use of ventilator (breathing machine)?: yes    \"If your health worsens and it becomes clear that your chance of recovery is unlikely, what would your preference be about the use of a ventilator (breathing machine) if it were available to you? \"     Would the patient desire the use of ventilator (breathing machine)?: No      Resuscitation  \"CPR works best to restart the heart when there is a sudden event, like a heart attack, in someone who is otherwise healthy. Unfortunately, CPR does not typically restart the heart for people who have serious health conditions or who are very sick. \"    \"In the event your heart stopped as a result of an underlying serious health condition, would you want attempts to be made to restart your heart (answer \"yes\" for attempt to resuscitate) or would you prefer a natural death (answer \"no\" for do not attempt to resuscitate)? \" yes       [] Yes   [x] No   Educated Patient / Decision Maker regarding differences between Advance Directives and portable DNR orders.     Length of ACP Conversation in minutes:  5    Conversation Outcomes:  [x] ACP discussion completed  [] Existing advance directive reviewed with patient; no changes to patient's previously recorded wishes  [] New Advance Directive completed  [] Portable Do Not Rescitate prepared for Provider review and signature  [] POLST/POST/MOLST/MOST prepared for Provider review and signature      Follow-up plan:    [] Schedule follow-up conversation to continue planning  [] Referred individual to Provider for additional questions/concerns   [] Advised patient/agent/surrogate to review completed ACP document and update if needed with changes in condition, patient preferences or care setting    [] This note routed to one or more involved healthcare providers

## 2023-02-06 NOTE — PLAN OF CARE
Problem: Pain  Goal: Verbalizes/displays adequate comfort level or baseline comfort level  2/6/2023 1013 by Bob Verduzco RN  Outcome: Progressing  Flowsheets (Taken 2/6/2023 1013)  Verbalizes/displays adequate comfort level or baseline comfort level:   Encourage patient to monitor pain and request assistance   Assess pain using appropriate pain scale   Administer analgesics based on type and severity of pain and evaluate response   Implement non-pharmacological measures as appropriate and evaluate response  2/6/2023 0009 by Viki Yusuf RN  Outcome: Progressing

## 2023-02-07 ENCOUNTER — CARE COORDINATION (OUTPATIENT)
Dept: CASE MANAGEMENT | Age: 78
End: 2023-02-07

## 2023-02-07 DIAGNOSIS — K81.1 CHRONIC CHOLECYSTITIS: Primary | ICD-10-CM

## 2023-02-07 PROCEDURE — 1111F DSCHRG MED/CURRENT MED MERGE: CPT | Performed by: FAMILY MEDICINE

## 2023-02-07 NOTE — CARE COORDINATION
[facility] Care Transitions Initial Follow Up Call    Call within 2 business days of discharge: Yes    LPN Care Coordinator contacted the patient by telephone to perform post hospital discharge assessment. Verified name and  with patient as identifiers. Provided introduction to self, and explanation of the LPN Care Coordinator role. Patient: Vy Brown Patient : 1945   MRN: [id number]  Reason for Admission: Chronic cholecystitis   Discharge Date: 23 RARS: Readmission Risk Score: 9.9      Last Discharge  Street       Date Complaint Diagnosis Description Type Department Provider    23  Gastroesophageal reflux disease, unspecified whether esophagitis present Admission (Discharged) ALEXANDRIA CHANG Isabel Canelo Blood, DO            Was this an external facility discharge? No Discharge Facility: n/a    Challenges to be reviewed by the provider   Additional needs identified to be addressed with provider: Yes  Discharge follow up appt               Method of communication with provider: chart routing. Attempted to contact patient for 24 hour initial transitional call. Unable to reach patient. Caller left a Hipaa compliant message introducing self, role, nature of the call and contact information for a return call. Return call from Chon Sanches. Pt states she has no abdominal pain. No nausea or vomiting. Appetite is good. Discussed following a low fat / low cholesterol diet. Verbalized understanding. Bowel and bladder WNL. 1111F medication reconciliation completed. Taking all medications as directed. Pt uses a walker to ambulate. Denies need for [facility]. Discussed discharge follow up with PCP. Pt has not scheduled an appt. Message routed to clinical pool for scheduling. Chon Sanches has an appt with her Cardiologist this 23. No new issues or concerns. Will continue to follow. Patient is aware of when to contact MD with any new or worsening symptoms.   Advised to contact PCP  with any health concerns for early outpatient intervention in an effort to avoid hospitalization. Report any worsening symptoms to PCP and/or Call 911 and/or GO TO EMERGENCY ROOM if symptoms are severe. Expresses understanding. [facility] Care Coordinator reviewed discharge instructions with patient who verbalized understanding. The patient was given an opportunity to ask questions and does not have any further questions or concerns at this time. Were discharge instructions available to patient? Yes. Reviewed appropriate site of care based on symptoms and resources available to patient including: PCP  When to call [address] The patient agrees to contact the PCP office for questions related to their healthcare. Advance Care Planning:   Does patient have an Advance Directive: reviewed and current. Medication reconciliation was performed with patient, who verbalizes understanding of administration of home medications. Medications reviewed, 1111F entered: yes    Was patient discharged with a pulse oximeter? no    Non-face-to-face services provided:  Obtained and reviewed discharge summary and/or continuity of care documents    Offered patient enrollment in the Remote Patient Monitoring (RPM) program for in-home monitoring: NA.    Care Transitions 24 Hour Call    Do you have all of your prescriptions and are they filled?: Yes  Care Transitions Interventions         Follow Up  No future appointments. LPN Care Coordinator provided contact information. Plan for follow-up call in 5-7 days based on severity of symptoms and risk factors.   Plan for next call: symptom management- monitor for abdominal pain  nausea  vomiting    self management-follow a low fat low cholesterol diet  follow-up appointment-with PCP  medication management-take all medications as directed    Laura Maldonado, [facility] Care Transitions Nurse   [phone]

## 2023-02-14 ENCOUNTER — CARE COORDINATION (OUTPATIENT)
Dept: CASE MANAGEMENT | Age: 78
End: 2023-02-14

## 2023-02-14 NOTE — CARE COORDINATION
[facility] Care Transitions Follow Up Call    Patient Current Location:  Home: [address] [place] [address] [address]    LPN Care Coordinator contacted the patient by telephone to follow up after admission on 23. Verified name and  with patient as identifiers. Patient: Luz Elena Cho  Patient : 1945   MRN: [id number]  Reason for Admission: Chronic cholecystitis   Discharge Date: 23 RARS: Readmission Risk Score: 9.9      Needs to be reviewed by the provider   Additional needs identified to be addressed with provider: No  none             Method of communication with provider: none. Subsequent transitional call. Spoke to :  Kyara Galvan. Kyara Galvan states she is doing well. Denies abdominal pain, nausea vomiting or diarrhea. Appetite is good. Bowel and bladder WNL. Taking all medications as prescribed. Pt has not scheduled with her PCP. Recommended she call PCP to schedule a follow up visit. Verbalized understanding. Will continue to follow. Patient is aware of when to contact MD with any new or worsening symptoms. Advised to contact PCP  with any health concerns for early outpatient intervention in an effort to avoid hospitalization. Report any worsening symptoms to PCP and/or Call 911 and/or GO TO EMERGENCY ROOM if symptoms are severe. Expresses understanding. Addressed changes since last contact:  none  Discussed follow-up appointments. If no appointment was previously scheduled, appointment scheduling offered: Yes. Is follow up appointment scheduled within 7 days of discharge? No.    Follow Up  No future appointments. Non-[facility] follow up appointment(s): n/a    LPN Care Coordinator reviewed discharge instructions with patient and discussed any barriers to care and/or understanding of plan of care after discharge. Discussed appropriate site of care based on symptoms and resources available to patient including: PCP  When to call [address]  The patient agrees to contact the PCP office for questions related to their healthcare. Advance Care Planning:   reviewed and current. Patients top risk factors for readmission: medical condition-  Chronic cholecystitis  Interventions to address risk factors: Obtained and reviewed discharge summary and/or continuity of care documents    Offered patient enrollment in the Remote Patient Monitoring (RPM) program for in-home monitoring: NA.     Care Transitions Subsequent and Final Call    Subsequent and Final Calls  Care Transitions Interventions  Other Interventions:             LPN Care Coordinator provided contact information for future needs. Plan for follow-up call in 7-10 days based on severity of symptoms and risk factors. Plan for next call: symptom management-abdominal pain  N/V/D   follow-up appointment-schedule an appt with PCP.      JANELLE White LPN Care Transitions Nurse   [phone]

## 2023-02-20 ENCOUNTER — CARE COORDINATION (OUTPATIENT)
Dept: CASE MANAGEMENT | Age: 78
End: 2023-02-20

## 2023-02-23 ENCOUNTER — TELEPHONE (OUTPATIENT)
Dept: FAMILY MEDICINE CLINIC | Age: 78
End: 2023-02-23

## 2023-02-23 NOTE — TELEPHONE ENCOUNTER
----- Message from Lolita Coates sent at 2/23/2023  9:52 AM EST -----  Subject: Message to Provider    QUESTIONS  Information for Provider? Patient cancelled her ER follow up visit and   didn't want to reschedule. She said she's feeling pretty good. ---------------------------------------------------------------------------  --------------  Niyah Umaña [facility]  [phone]; OK to leave message on voicemail  ---------------------------------------------------------------------------  --------------  SCRIPT ANSWERS  Relationship to Patient?  Self

## 2023-03-28 DIAGNOSIS — F51.01 PRIMARY INSOMNIA: ICD-10-CM

## 2023-03-28 NOTE — TELEPHONE ENCOUNTER
Lov: 04/05/2022  Nov: Nothing Scheduled    Cancelled appt: 02/23/20, 11/28/2022  No show: 10/04/2022

## 2023-03-29 RX ORDER — TRAZODONE HYDROCHLORIDE 50 MG/1
50 TABLET ORAL NIGHTLY
Qty: 30 TABLET | Refills: 0 | Status: SHIPPED | OUTPATIENT
Start: 2023-03-29

## 2023-04-29 DIAGNOSIS — F51.01 PRIMARY INSOMNIA: ICD-10-CM

## 2023-05-01 RX ORDER — TRAZODONE HYDROCHLORIDE 50 MG/1
TABLET ORAL
Qty: 30 TABLET | Refills: 5 | Status: SHIPPED | OUTPATIENT
Start: 2023-05-01

## 2023-06-02 RX ORDER — TIZANIDINE 4 MG/1
TABLET ORAL
Qty: 40 TABLET | Refills: 1 | Status: SHIPPED | OUTPATIENT
Start: 2023-06-02

## 2023-06-02 NOTE — TELEPHONE ENCOUNTER
[facility] is calling to request a refill on the following medication(s):    Last Visit Date (If Applicable):  3/5/5225    Next Visit Date:    Visit date not found    Medication Request:  Requested Prescriptions     Pending Prescriptions Disp Refills    tiZANidine (ZANAFLEX) 4 MG tablet [Pharmacy Med Name: tiZANidine HCL 4MG TABLET] 40 tablet      Sig: TAKE ONE TABLET BY MOUTH FOUR TIMES A DAY AS NEEDED FOR MUSCLE SPASMS

## 2023-10-15 DIAGNOSIS — K21.9 GASTROESOPHAGEAL REFLUX DISEASE, UNSPECIFIED WHETHER ESOPHAGITIS PRESENT: ICD-10-CM

## 2023-10-16 NOTE — TELEPHONE ENCOUNTER
Stefan Stauffer is calling to request a refill on the following medication(s):    Last Visit Date (If Applicable):  1/9/8276    Next Visit Date:    Visit date not found    Medication Request:  Requested Prescriptions     Pending Prescriptions Disp Refills    pantoprazole (PROTONIX) 40 MG tablet [Pharmacy Med Name: PANTOPRAZOLE SOD DR 40 MG TAB] 90 tablet 1     Sig: TAKE ONE TABLET BY MOUTH EVERY MORNING BEFORE BREAKFAST

## 2023-10-17 RX ORDER — PANTOPRAZOLE SODIUM 40 MG/1
TABLET, DELAYED RELEASE ORAL
Qty: 90 TABLET | Refills: 1 | OUTPATIENT
Start: 2023-10-17

## 2023-10-26 DIAGNOSIS — K21.9 GASTROESOPHAGEAL REFLUX DISEASE, UNSPECIFIED WHETHER ESOPHAGITIS PRESENT: ICD-10-CM

## 2023-10-26 RX ORDER — PANTOPRAZOLE SODIUM 40 MG/1
TABLET, DELAYED RELEASE ORAL
Qty: 90 TABLET | Refills: 0 | OUTPATIENT
Start: 2023-10-26

## 2023-10-31 DIAGNOSIS — K21.9 GASTROESOPHAGEAL REFLUX DISEASE, UNSPECIFIED WHETHER ESOPHAGITIS PRESENT: ICD-10-CM

## 2023-11-01 ENCOUNTER — TELEPHONE (OUTPATIENT)
Dept: FAMILY MEDICINE CLINIC | Age: 78
End: 2023-11-01

## 2023-11-01 RX ORDER — PANTOPRAZOLE SODIUM 40 MG/1
TABLET, DELAYED RELEASE ORAL
Qty: 90 TABLET | Refills: 1 | OUTPATIENT
Start: 2023-11-01

## 2023-11-01 NOTE — TELEPHONE ENCOUNTER
Patient called in stating she fell and now she is having some knee pain     Patient called in through NT the phone was disconnected called patient no answer left voicemail to give office a call back    Please advise

## 2023-11-01 NOTE — TELEPHONE ENCOUNTER
Patient called back to inform office that she does not have any bruising, swelling or redness . Patient was offered appt with another provider and declined, will only see pcp.

## 2023-11-07 DIAGNOSIS — F51.01 PRIMARY INSOMNIA: ICD-10-CM

## 2023-11-07 RX ORDER — TRAZODONE HYDROCHLORIDE 50 MG/1
TABLET ORAL
Qty: 30 TABLET | Refills: 5 | Status: SHIPPED | OUTPATIENT
Start: 2023-11-07

## 2024-06-25 DIAGNOSIS — E78.2 MIXED HYPERLIPIDEMIA: ICD-10-CM

## 2024-06-25 NOTE — TELEPHONE ENCOUNTER
[facility] CLINICAL PHARMACY: ADHERENCE REVIEW  Identified care gap per [place]: fills at [facility]: Statin adherence      ASSESSMENT  STATIN ADHERENCE    Insurance Records claims through 24 (Prior Year PDC = not reported; YTD PDC = FIRST FILL; Potential Fail Date: 24):   ATORVASTATIN 40 MG  Next refill due: 24    Prescribed si tablet/capsule daily    Per Reconcile Dispense History: last filled on 10/26/23 for 90 day supply.     Per [facility]:  will send refill auth to provider    RX# [id number]    Lab Results   Component Value Date    CHOL 138 2021    TRIG 92 2021    HDL 55 2021     Lab Results   Component Value Date    LDL 65 2021      ALT   Date Value Ref Range Status   2023 45 (H) 5 - 33 U/L Final     AST   Date Value Ref Range Status   2023 45 (H) <32 U/L Final     The ASCVD Risk score (Jamil DK, et al., 2019) failed to calculate for the following reasons:    The 2019 ASCVD risk score is only valid for ages 40 to 79    The patient has a prior MI or stroke diagnosis       The following are interventions that have been identified:   Patient OVERDUE refilling ATORVASTATIN 40 MG and active on home medication list.     Verify fill then send mychart    Last Visit: none  Next Visit: none    Joanne Honeycutt CPhT.   [facility] Clinical   [facility] Clinical [facility]  Toll free: [phone] Option 1

## 2024-07-08 NOTE — TELEPHONE ENCOUNTER
7/8/24 per portal no claim for statin.  Not sure if not approved by Dr. Frank    Left message for patient to call back to verify if she is still on atorvastatin 40mg QD    Joanne Honeycutt CPhT.   Population Health Clinical   [facility] Clinical [facility]acy  Toll free: [phone] Option 2

## 2024-07-10 RX ORDER — ATORVASTATIN CALCIUM 40 MG/1
40 TABLET, FILM COATED ORAL DAILY
Qty: 90 TABLET | Refills: 1 | Status: SHIPPED | OUTPATIENT
Start: 2024-07-10

## 2024-07-10 NOTE — TELEPHONE ENCOUNTER
Unable to reach patient by phone to discuss refill for  at [facility] - mail box is full (not able to leave a voicemail message).  Will send a letter.    Tracie Terry, PharmD, [facility]  Population Health Pharmacist  [facility] Clinical Pharmacy  Department, toll free: [phone], option 1      For Pharmacy Admin Tracking Only  Program: [employer]  CPA in place:  No  Recommendation Provided To: Provider: 1 via Note to Provider  Intervention Detail: Adherence Monitorin and Refill(s) Provided  Intervention Accepted By: Provider: 1  Gap Closed?: No   Time Spent (min): 30

## 2024-07-10 NOTE — TELEPHONE ENCOUNTER
Atorvastatin was previously prescribed by Dr. Saul Frank (cardiology) - unclear if patient is continuing to see Dr. Frank.  Per chart review, appears patient was last seen on 2/6/23 during inpatient admission.    Will pend a refill request to patient's primary provider for atorvastatin (Dr. Kent has also prescribed atorvastatin for the patient previously).    Tracie Terry, PharmD, [facility] Health Pharmacist  [facility] Clinical Pharmacy  Department, toll free: [phone], option 1

## 2024-07-10 NOTE — TELEPHONE ENCOUNTER
Dana Kent MD, patient out of refills.    Prescription(s) pended for your signature/modification as appropriate for the following medication(s):  Atorvastatin 40 mg daily      Refill Due Date: 5/12/24      Thank you,  Tracie Terry, PharmD, [facility]  Population Health Pharmacist  [facility] Clinical Pharmacy  Department, toll free: [phone], option 1

## 2024-08-04 DIAGNOSIS — E03.9 HYPOTHYROIDISM, UNSPECIFIED TYPE: ICD-10-CM

## 2024-08-04 RX ORDER — LEVOTHYROXINE SODIUM 100 UG/1
TABLET ORAL
Qty: 30 TABLET | Refills: 1 | Status: SHIPPED | OUTPATIENT
Start: 2024-08-04

## 2024-11-05 DIAGNOSIS — E03.9 HYPOTHYROIDISM, UNSPECIFIED TYPE: ICD-10-CM

## 2024-11-07 RX ORDER — LEVOTHYROXINE SODIUM 100 UG/1
TABLET ORAL
Qty: 30 TABLET | Refills: 1 | OUTPATIENT
Start: 2024-11-07

## 2024-11-20 DIAGNOSIS — E03.9 HYPOTHYROIDISM, UNSPECIFIED TYPE: ICD-10-CM

## 2024-11-20 DIAGNOSIS — F51.01 PRIMARY INSOMNIA: ICD-10-CM

## 2024-11-20 RX ORDER — LEVOTHYROXINE SODIUM 100 UG/1
TABLET ORAL
Qty: 30 TABLET | Refills: 1 | OUTPATIENT
Start: 2024-11-20

## 2024-11-20 RX ORDER — TRAZODONE HYDROCHLORIDE 50 MG/1
TABLET, FILM COATED ORAL
Qty: 30 TABLET | Refills: 5 | OUTPATIENT
Start: 2024-11-20

## 2024-12-05 DIAGNOSIS — F51.01 PRIMARY INSOMNIA: ICD-10-CM

## 2024-12-06 RX ORDER — TRAZODONE HYDROCHLORIDE 50 MG/1
TABLET, FILM COATED ORAL
Qty: 30 TABLET | Refills: 5 | OUTPATIENT
Start: 2024-12-06

## 2024-12-12 ENCOUNTER — TELEPHONE (OUTPATIENT)
Dept: FAMILY MEDICINE CLINIC | Age: 79
End: 2024-12-12

## 2024-12-12 NOTE — TELEPHONE ENCOUNTER
Records indicate patient has not been seen in awhile to discuss current medical condition.    Called and left patient voicemail to schedule follow up, if needed.

## 2024-12-14 DIAGNOSIS — F51.01 PRIMARY INSOMNIA: ICD-10-CM

## 2024-12-16 RX ORDER — TRAZODONE HYDROCHLORIDE 50 MG/1
TABLET, FILM COATED ORAL
Qty: 30 TABLET | Refills: 5 | OUTPATIENT
Start: 2024-12-16

## 2024-12-16 NOTE — TELEPHONE ENCOUNTER
Ally MARTIN [place] is calling to request a refill on the following medication(s):    Last Visit Date (If Applicable):  4/5/2022    Next Visit Date:    Visit date not found    Medication Request:  Requested Prescriptions     Pending Prescriptions Disp Refills    traZODone (DESYREL) 50 MG tablet [Pharmacy Med Name: traZODone 50 MG TABLET] 30 tablet 5     Sig: TAKE ONE TABLET BY MOUTH ONCE NIGHTLY *MUST SCHEDULE APPOINTMENT*

## 2025-01-03 DIAGNOSIS — F51.01 PRIMARY INSOMNIA: ICD-10-CM

## 2025-01-03 DIAGNOSIS — E78.2 MIXED HYPERLIPIDEMIA: ICD-10-CM

## 2025-01-03 RX ORDER — TRAZODONE HYDROCHLORIDE 50 MG/1
TABLET ORAL
Qty: 30 TABLET | Refills: 5 | OUTPATIENT
Start: 2025-01-03

## 2025-01-03 RX ORDER — ATORVASTATIN CALCIUM 40 MG/1
40 TABLET, FILM COATED ORAL DAILY
Qty: 90 TABLET | Refills: 1 | OUTPATIENT
Start: 2025-01-03

## 2025-07-09 NOTE — TELEPHONE ENCOUNTER
No claim and called Julianne using order status.  No refill and would need refill auth.    Will send to PharmD to route this time,    Joanne Honeycutt CPhT.   Population Health Clinical   [facility]  Toll free: [phone] Option 2    Plan:  Hold home regimen of Nebivolol 5 mg daily, Lisinopril 20 mg BID, Verapamil 120 mg TID, and HCTZ 25 mg every other day  Consult Cardiology for medication adjustments

## (undated) DEVICE — BLADELESS OBTURATOR: Brand: WECK VISTA

## (undated) DEVICE — ST. ANNE'S MULTI PORT PACK: Brand: MEDLINE INDUSTRIES, INC.

## (undated) DEVICE — TIP COVER ACCESSORY

## (undated) DEVICE — TRAP SURG QUAD PARABOLA SLOT DSGN SFTY SCRN TRAPEASE

## (undated) DEVICE — BLANKET WRM W29.9XL79.1IN UP BODY FORC AIR MISTRAL-AIR

## (undated) DEVICE — ARM DRAPE

## (undated) DEVICE — GARMENT,MEDLINE,DVT,INT,CALF,MED, GEN2: Brand: MEDLINE

## (undated) DEVICE — DUAL LUMEN STOMACH TUBE: Brand: SALEM SUMP

## (undated) DEVICE — ADHESIVE SKIN CLOSURE TOP 36 CC HI VISC DERMBND MINI

## (undated) DEVICE — DEVICE TRCR 12X9X3IN WHT CLSR DISP OMNICLOSE

## (undated) DEVICE — ELECTRO LUBE IS A SINGLE PATIENT USE DEVICE THAT IS INTENDED TO BE USED ON ELECTROSURGICAL ELECTRODES TO REDUCE STICKING.: Brand: KEY SURGICAL ELECTRO LUBE

## (undated) DEVICE — JELLY,LUBE,STERILE,FLIP TOP,TUBE,2-OZ: Brand: MEDLINE

## (undated) DEVICE — INSUFFLATION NEEDLE TO ESTABLISH PNEUMOPERITONEUM.: Brand: INSUFFLATION NEEDLE

## (undated) DEVICE — NEEDLE HYPO 25GA L1.5IN BLU POLYPR HUB S STL REG BVL STR

## (undated) DEVICE — TRAP POLYP ETRAP

## (undated) DEVICE — APPLICATOR MEDICATED 26 CC SOLUTION HI LT ORNG CHLORAPREP

## (undated) DEVICE — NO USE 18 MONTHS GOWN ISOL XL YEL LF

## (undated) DEVICE — SUTURE MCRYL SZ 4-0 L27IN ABSRB UD L19MM PS-2 1/2 CIR PRIM Y426H

## (undated) DEVICE — SUTURE SZ 0 27IN 5/8 CIR UR-6  TAPER PT VIOLET ABSRB VICRYL J603H

## (undated) DEVICE — CIRCUIT BREATHING AD

## (undated) DEVICE — CANNULA SEAL

## (undated) DEVICE — POSITIONER HD W8XH4XL8.5IN RASPBERRY FOAM SLT

## (undated) DEVICE — FORCEPS BX L240CM WRK CHN 2.8MM STD CAP W/ NDL MIC MESH

## (undated) DEVICE — TROCAR: Brand: KII FIOS FIRST ENTRY

## (undated) DEVICE — BAG SPEC REM 224ML W4XL6IN DIA10MM 1 HND GYN DISP ENDOPCH